# Patient Record
Sex: FEMALE | Race: WHITE | NOT HISPANIC OR LATINO | Employment: FULL TIME | URBAN - METROPOLITAN AREA
[De-identification: names, ages, dates, MRNs, and addresses within clinical notes are randomized per-mention and may not be internally consistent; named-entity substitution may affect disease eponyms.]

---

## 2023-06-23 ENCOUNTER — HOSPITAL ENCOUNTER (EMERGENCY)
Facility: HOSPITAL | Age: 38
Discharge: HOME | End: 2023-06-23
Attending: EMERGENCY MEDICINE
Payer: COMMERCIAL

## 2023-06-23 VITALS
SYSTOLIC BLOOD PRESSURE: 119 MMHG | TEMPERATURE: 97.7 F | DIASTOLIC BLOOD PRESSURE: 70 MMHG | HEART RATE: 80 BPM | OXYGEN SATURATION: 97 % | RESPIRATION RATE: 16 BRPM

## 2023-06-23 DIAGNOSIS — L03.213 PRESEPTAL CELLULITIS OF RIGHT EYE: Primary | ICD-10-CM

## 2023-06-23 PROCEDURE — 99283 EMERGENCY DEPT VISIT LOW MDM: CPT

## 2023-06-23 RX ORDER — CEPHALEXIN 500 MG/1
500 CAPSULE ORAL ONCE
Status: COMPLETED | OUTPATIENT
Start: 2023-06-23 | End: 2023-06-23

## 2023-06-23 RX ORDER — METHYLPREDNISOLONE 4 MG/1
TABLET ORAL
Qty: 21 TABLET | Refills: 0 | Status: SHIPPED | OUTPATIENT
Start: 2023-06-23

## 2023-06-23 RX ORDER — CEPHALEXIN 500 MG/1
500 CAPSULE ORAL EVERY 6 HOURS SCHEDULED
Qty: 40 CAPSULE | Refills: 0 | Status: SHIPPED | OUTPATIENT
Start: 2023-06-23 | End: 2023-07-03

## 2023-06-23 RX ORDER — PREDNISONE 20 MG/1
40 TABLET ORAL ONCE
Status: COMPLETED | OUTPATIENT
Start: 2023-06-23 | End: 2023-06-23

## 2023-06-23 RX ADMIN — CEPHALEXIN 500 MG: 500 CAPSULE ORAL at 06:47

## 2023-06-23 RX ADMIN — PREDNISONE 40 MG: 20 TABLET ORAL at 06:47

## 2023-06-23 NOTE — ED PROVIDER NOTES
History  Chief Complaint   Patient presents with   • Eye Swelling     Pt arrives with swelling of right eye starting 4 days ago  Reports felt a burning when gardening and progressively eye has become more swollen and itchy  Pt with poison ivy on arms and legs as well  Pt started on amoxicillin and steroids yesterday by walk-in clinic  Has taken one dose so far  79-year-old female presents to the emergency department for evaluation of swelling around the right eye  Patient states that symptoms started about 4 days ago after she was working in her garden and felt as though she was bitten in the face by some type of insect  She also reports that she rubbed her eyes and is currently also suffering from poison ivy on both bilateral forearms  Swelling worsened and so she was seen yesterday at a walk-in clinic and started on amoxicillin and given a shot of dexamethasone  Despite that and 6 hours later she is still having some worsening of her swelling  She denies any vision changes, fever, nausea vomiting or pain with movement of her eye  Patient does not wear contact lenses  History provided by:  Patient   used: No        Prior to Admission Medications   Prescriptions Last Dose Informant Patient Reported? Taking?   ascorbic acid (VITAMIN C) 500 MG tablet  Self No No   Sig: Take 1 tablet (500 mg total) by mouth 3 (three) times a week Monday, Wednesday, Friday   ferrous sulfate 324 (65 Fe) mg  Self No No   Sig: Take 1 tablet (324 mg total) by mouth 3 (three) times a week Monday, Wednesday, Friday   rizatriptan (MAXALT) 5 mg tablet   No No   Sig: TAKE 1 TABLET (5 MG TOTAL) BY MOUTH AS NEEDED FOR MIGRAINE TAKE AT THE ONSET OF MIGRAINE IF SYMPTOMS CONTINUE OR RETURN, MAY TAKE ANOTHER DOSE AT LEAST 2 HOURS AFTER FIRST DOSE  TAKE NO MORE THAN 2 DOSES IN A DAY  Facility-Administered Medications: None       History reviewed  No pertinent past medical history      Past Surgical History: Procedure Laterality Date   • TUBAL LIGATION Bilateral 2012       Family History   Problem Relation Age of Onset   • COPD Mother    • Endometriosis Mother    • Breast cancer Maternal Aunt 36     I have reviewed and agree with the history as documented  E-Cigarette/Vaping   • E-Cigarette Use Never User      E-Cigarette/Vaping Substances   • Nicotine No    • THC No    • CBD No    • Flavoring No    • Other No    • Unknown No      Social History     Tobacco Use   • Smoking status: Never   Vaping Use   • Vaping Use: Never used   Substance Use Topics   • Alcohol use: Not Currently     Comment: 1-2 drinks once a week   • Drug use: Never       Review of Systems   Constitutional: Negative for fever  HENT: Positive for facial swelling  Eyes: Positive for itching  Negative for photophobia, pain, discharge, redness and visual disturbance  Gastrointestinal: Negative for nausea and vomiting  Skin: Positive for rash  Neurological: Negative for numbness  All other systems reviewed and are negative  Physical Exam  Physical Exam  Vitals and nursing note reviewed  Constitutional:       Appearance: Normal appearance  She is not ill-appearing or toxic-appearing  HENT:      Head: Normocephalic  Nose: Nose normal       Mouth/Throat:      Mouth: Mucous membranes are moist    Eyes:      General: Vision grossly intact  No visual field deficit or scleral icterus  Right eye: No discharge  Left eye: No discharge  Extraocular Movements: Extraocular movements intact  Right eye: Normal extraocular motion  Conjunctiva/sclera: Conjunctivae normal       Right eye: Right conjunctiva is not injected  No chemosis, exudate or hemorrhage  Pupils: Pupils are equal, round, and reactive to light  Comments: Significant amount of swelling and redness around the right thigh  There is no pain with extraocular movements  Patient is visual acuity is 20/25 in each eye  Globes nice and soft  There is no drainage from the eye  Skin:     General: Skin is warm and dry  Findings: Rash present  Comments: Bilateral forearms with rash consistent with poison ivy   Neurological:      General: No focal deficit present  Mental Status: She is alert  Mental status is at baseline  Vital Signs  ED Triage Vitals [06/23/23 0622]   Temperature Pulse Respirations Blood Pressure SpO2   97 7 °F (36 5 °C) 80 16 119/70 97 %      Temp Source Heart Rate Source Patient Position - Orthostatic VS BP Location FiO2 (%)   Oral Monitor Sitting Right arm --      Pain Score       --           Vitals:    06/23/23 0622   BP: 119/70   Pulse: 80   Patient Position - Orthostatic VS: Sitting         Visual Acuity      ED Medications  Medications   cephalexin (KEFLEX) capsule 500 mg (500 mg Oral Given 6/23/23 0647)   predniSONE tablet 40 mg (40 mg Oral Given 6/23/23 2423)       Diagnostic Studies  Results Reviewed     None                 No orders to display              Procedures  Procedures         ED Course                                             Medical Decision Making  53-year-old healthy female presents to the emergency department with what appears to be preseptal cellulitis  She has no fever no visual signs of illness, no pain with extraocular movements and she has a normal eye exam   She has been on antibiotics for less than a full 24 hours  Vital signs reviewed  Plan is to discharge her on continued antibiotics and some additional steroids not only to help with her swelling but to help with the poison ivy she is suffering from more arms  She is also given some supportive care directions as well  I have encouraged her to follow-up with her primary care physician in the next 24 to 48 hours and have provided her with strict risk return precautions  Patient understands and agrees with plan as discussed and all questions answered prior to discharge      Risk  Prescription drug management  Disposition  Final diagnoses:   Preseptal cellulitis of right eye     Time reflects when diagnosis was documented in both MDM as applicable and the Disposition within this note     Time User Action Codes Description Comment    6/23/2023  6:33 AM Vi Beaulieu Add [X39 [id number]] Preseptal cellulitis of right eye       ED Disposition     None      Follow-up Information     Follow up With Specialties Details Why [address], DO Family Medicine Call in 1 day  [address]  [facility]  [address] [facility]  [phone]            Patient's Medications   Discharge Prescriptions    CEPHALEXIN (KEFLEX) 500 MG CAPSULE    Take 1 capsule (500 mg total) by mouth every 6 (six) hours for 10 days       Start Date: 6/23/2023 End Date: 7/3/2023       Order Dose: 500 mg       Quantity: 40 capsule    Refills: 0    METHYLPREDNISOLONE 4 MG TABLET THERAPY PACK    Use as directed on package       Start Date: 6/23/2023 End Date: --       Order Dose: --       Quantity: 21 tablet    Refills: 0       No discharge procedures on file      PDMP Review     None          ED Provider  Electronically Signed by           Kishore Braden MD  06/23/23 3133

## 2023-06-23 NOTE — DISCHARGE INSTRUCTIONS
Take all of the medications as directed  Apply warm compresses to the area throughout the day to help with inflammation and swelling  Follow-up with your primary doctor in the next 24 to 48 hours  Return to the emergency department immediately for any new or worsening symptoms

## 2023-06-27 ENCOUNTER — TELEPHONE (OUTPATIENT)
Dept: FAMILY MEDICINE CLINIC | Facility: CLINIC | Age: 38
End: 2023-06-27

## 2023-10-25 ENCOUNTER — TELEPHONE (OUTPATIENT)
Dept: HEMATOLOGY ONCOLOGY | Facility: CLINIC | Age: 38
End: 2023-10-25

## 2023-10-25 NOTE — TELEPHONE ENCOUNTER
Spoke with patient regarding labs needed to be drawn prior to appointment Monday, October 30th. Indicated the scripts are in the system, they are non-fasting and patient can go to any [facility] facility to have the labs drawn. Patient verbalized understanding.

## 2023-10-26 ENCOUNTER — APPOINTMENT (OUTPATIENT)
Dept: LAB | Facility: HOSPITAL | Age: 38
End: 2023-10-26
Payer: COMMERCIAL

## 2023-10-26 ENCOUNTER — TELEPHONE (OUTPATIENT)
Dept: HEMATOLOGY ONCOLOGY | Facility: CLINIC | Age: 38
End: 2023-10-26

## 2023-10-26 DIAGNOSIS — D56.3 ALPHA THALASSEMIA TRAIT: ICD-10-CM

## 2023-10-26 DIAGNOSIS — N92.0 MENORRHAGIA WITH REGULAR CYCLE: ICD-10-CM

## 2023-10-26 DIAGNOSIS — E61.1 IRON DEFICIENCY: ICD-10-CM

## 2023-10-26 LAB
BASOPHILS # BLD AUTO: 0.06 THOUSANDS/ÂΜL (ref 0–0.1)
BASOPHILS NFR BLD AUTO: 1 % (ref 0–1)
EOSINOPHIL # BLD AUTO: 0.12 THOUSAND/ÂΜL (ref 0–0.61)
EOSINOPHIL NFR BLD AUTO: 2 % (ref 0–6)
ERYTHROCYTE [DISTWIDTH] IN BLOOD BY AUTOMATED COUNT: 13.2 % (ref 11.6–15.1)
FERRITIN SERPL-MCNC: 29 NG/ML (ref 11–307)
HCT VFR BLD AUTO: 39.2 % (ref 34.8–46.1)
HGB BLD-MCNC: 12.8 G/DL (ref 11.5–15.4)
IMM GRANULOCYTES # BLD AUTO: 0.02 THOUSAND/UL (ref 0–0.2)
IMM GRANULOCYTES NFR BLD AUTO: 0 % (ref 0–2)
IRON SATN MFR SERPL: 11 % (ref 15–50)
IRON SERPL-MCNC: 33 UG/DL (ref 50–212)
LYMPHOCYTES # BLD AUTO: 1.57 THOUSANDS/ÂΜL (ref 0.6–4.47)
LYMPHOCYTES NFR BLD AUTO: 29 % (ref 14–44)
MCH RBC QN AUTO: 26.7 PG (ref 26.8–34.3)
MCHC RBC AUTO-ENTMCNC: 32.7 G/DL (ref 31.4–37.4)
MCV RBC AUTO: 82 FL (ref 82–98)
MONOCYTES # BLD AUTO: 0.44 THOUSAND/ÂΜL (ref 0.17–1.22)
MONOCYTES NFR BLD AUTO: 8 % (ref 4–12)
NEUTROPHILS # BLD AUTO: 3.2 THOUSANDS/ÂΜL (ref 1.85–7.62)
NEUTS SEG NFR BLD AUTO: 60 % (ref 43–75)
NRBC BLD AUTO-RTO: 0 /100 WBCS
PLATELET # BLD AUTO: 214 THOUSANDS/UL (ref 149–390)
PMV BLD AUTO: 11.8 FL (ref 8.9–12.7)
RBC # BLD AUTO: 4.79 MILLION/UL (ref 3.81–5.12)
TIBC SERPL-MCNC: 298 UG/DL (ref 250–450)
TSH SERPL DL<=0.05 MIU/L-ACNC: 2.34 UIU/ML (ref 0.45–4.5)
UIBC SERPL-MCNC: 265 UG/DL (ref 155–355)
WBC # BLD AUTO: 5.41 THOUSAND/UL (ref 4.31–10.16)

## 2023-10-26 PROCEDURE — 84443 ASSAY THYROID STIM HORMONE: CPT

## 2023-10-26 PROCEDURE — 82728 ASSAY OF FERRITIN: CPT

## 2023-10-26 PROCEDURE — 85025 COMPLETE CBC W/AUTO DIFF WBC: CPT

## 2023-10-26 PROCEDURE — 83540 ASSAY OF IRON: CPT

## 2023-10-26 PROCEDURE — 83550 IRON BINDING TEST: CPT

## 2023-10-26 PROCEDURE — 36415 COLL VENOUS BLD VENIPUNCTURE: CPT

## 2023-10-26 NOTE — TELEPHONE ENCOUNTER
Spoke with patient regarding two appointment times on Monday.   Confirmed she would prefer the 9:00am to the 10:40am, so cancelled the 10:40am.

## 2023-10-30 ENCOUNTER — OFFICE VISIT (OUTPATIENT)
Dept: HEMATOLOGY ONCOLOGY | Facility: MEDICAL CENTER | Age: 38
End: 2023-10-30
Payer: COMMERCIAL

## 2023-10-30 ENCOUNTER — TELEPHONE (OUTPATIENT)
Dept: HEMATOLOGY ONCOLOGY | Facility: MEDICAL CENTER | Age: 38
End: 2023-10-30

## 2023-10-30 VITALS
HEIGHT: 62 IN | OXYGEN SATURATION: 100 % | SYSTOLIC BLOOD PRESSURE: 110 MMHG | RESPIRATION RATE: 17 BRPM | TEMPERATURE: 98 F | WEIGHT: 139 LBS | DIASTOLIC BLOOD PRESSURE: 64 MMHG | HEART RATE: 84 BPM | BODY MASS INDEX: 25.58 KG/M2

## 2023-10-30 DIAGNOSIS — E61.1 IRON DEFICIENCY: Primary | ICD-10-CM

## 2023-10-30 DIAGNOSIS — D56.3 ALPHA THALASSEMIA TRAIT: ICD-10-CM

## 2023-10-30 PROCEDURE — 99215 OFFICE O/P EST HI 40 MIN: CPT | Performed by: INTERNAL MEDICINE

## 2023-10-30 RX ORDER — SODIUM CHLORIDE 9 MG/ML
20 INJECTION, SOLUTION INTRAVENOUS ONCE
Status: CANCELLED | OUTPATIENT
Start: 2023-10-31

## 2023-10-30 RX ORDER — SODIUM CHLORIDE 9 MG/ML
20 INJECTION, SOLUTION INTRAVENOUS ONCE
Status: CANCELLED | OUTPATIENT
Start: 2023-10-30

## 2023-10-30 NOTE — TELEPHONE ENCOUNTER
Lvm on answering machine with time and date of infusions, patient aware schedule is updated on [employer]. Patient has my teams number to return my call and confirm appts.

## 2023-10-30 NOTE — PROGRESS NOTES
Wilian Alexandre  1985  [facility] [place] [address] [address] [facility] [id number]    DISCUSSION/SUMMARY:    39-year-old female with history of iron deficiency anemia, alpha thalassemia trait. Issues:    History of iron deficiency anemia. Patient's hemoglobin level is okay but patient has a decreased ferritin level, significant fatigue, is symptomatic. Routinely patient has required IV iron approximately 1-2 times each year. Last IV iron was approximately 11 months ago. We discussed options. Patient is to be treated with Feraheme IV, days 1 and 8. Alpha thalassemia trait. This obviously adds to the anemia. Patient should take a multivitamin with folate. Bicornate uterus, heavy menstrual periods. Patient was seen by gynecology in [facility], referred to a specialist.  Mrs. Kirsten Aiken is to follow-up with the specialist.  Other manipulations need to be discussed as the iron deficiency anemia will continue if patient has persistently heavy menstrual periods. History of headaches, blood pressure problems (NOS). The headaches may be due to the anemia at least partially. Patient understands that she needs to find a PCP here in [facility] catch up on routine health maintenance and medical care as well as to address any blood pressure problems. Patient is to return to this office in 6 months with repeat blood work before. Mrs. Kirsten Aiken knows to call the hematology/oncology office if there are any other questions or concerns. Carefully review your medication list and verify that the list is accurate and up-to-date. Please call the hematology/oncology office if there are medications missing from the list, medications on the list that you are not currently taking or if there is a dosage or instruction that is different from how you're taking that medication.     Patient goals and areas of care: Monitor CBC and iron studies  Barriers to care:  Patient is able to self-care  _____________________________________________________________________________________    Chief Complaint   Patient presents with    Follow-up    Iron deficiency anemia     History of Present Illness: 26-year-old female with a history of iron deficiency anemia, history of heavy menstrual periods previously moving from [place] (where she had received routine IV iron). Patient also with an alpha thalassemia trait. Patient was previously seen by TREASURE Velez.    Mrs. Lao Postal had been getting iron infusions approximately every 6 months in [place]. Patient states feeling +/-. Still with heavy menstrual periods. Still with fatigue, no chewing ice but ++ restless legs. No shortness of breath or dyspnea on exertion, no chest pain or pressure. No GI or  problems. Appetite is good, weight is stable. Patient has had blood pressure issues in the past; has not seen a PCP while in [facility]. Review of Systems   Constitutional:  Positive for fatigue. HENT: Negative. Eyes: Negative. Respiratory: Negative. Cardiovascular: Negative. Gastrointestinal: Negative. Endocrine: Negative. Genitourinary:  Positive for menstrual problem. Musculoskeletal: Negative. Skin: Negative. Allergic/Immunologic: Negative. Neurological: Negative. Hematological: Negative. Psychiatric/Behavioral: Negative. All other systems reviewed and are negative. Patient Active Problem List   Diagnosis    Alpha thalassemia trait    Bicornuate uterus    Intractable migraine without aura and without status migrainosus    Iron deficiency     No past medical history on file.   Past Surgical History:   Procedure Laterality Date    TUBAL LIGATION Bilateral 2012     Family History   Problem Relation Age of Onset    COPD Mother     Endometriosis Mother     Breast cancer Maternal Aunt 36     Social History     Socioeconomic History    Marital status: /Civil Union     Spouse name: Not on file    Number of children: Not on file    Years of education: Not on file    Highest education level: Not on file   Occupational History    Not on file   Tobacco Use    Smoking status: Never    Smokeless tobacco: Not on file   Vaping Use    Vaping Use: Never used   Substance and Sexual Activity    Alcohol use: Not Currently     Comment: 1-2 drinks once a week    Drug use: Never    Sexual activity: Yes     Partners: Male     Birth control/protection: Female Sterilization     Comment: 15 years    Other Topics Concern    Not on file   Social History Narrative    Not on file     Social Determinants of Health     Financial Resource Strain: Not on file   Food Insecurity: Not on file   Transportation Needs: Not on file   Physical Activity: Not on file   Stress: Not on file   Social Connections: Not on file   Intimate Partner Violence: Not on file   Housing Stability: Not on file       Current Outpatient Medications:     ascorbic acid (VITAMIN C) 500 MG tablet, Take 1 tablet (500 mg total) by mouth 3 (three) times a week Monday, Wednesday, Friday (Patient taking differently: Take 500 mg by mouth 3 (three) times a week PRN), Disp: 30 tablet, Rfl: 3    ferrous sulfate 324 (65 Fe) mg, Take 1 tablet (324 mg total) by mouth 3 (three) times a week Monday, Wednesday, Friday, Disp: 30 tablet, Rfl: 3    methylPREDNISolone 4 MG tablet therapy pack, Use as directed on package (Patient not taking: Reported on 10/30/2023), Disp: 21 tablet, Rfl: 0    rizatriptan (MAXALT) 5 mg tablet, TAKE 1 TABLET (5 MG TOTAL) BY MOUTH AS NEEDED FOR MIGRAINE TAKE AT THE ONSET OF MIGRAINE IF SYMPTOMS CONTINUE OR RETURN, MAY TAKE ANOTHER DOSE AT LEAST 2 HOURS AFTER FIRST DOSE. TAKE NO MORE THAN 2 DOSES IN A DAY.  (Patient not taking: Reported on 10/30/2023), Disp: 4 tablet, Rfl: 2    Allergies   Allergen Reactions    Tetanus-Diphtheria Toxoids Td Swelling       Vitals:    10/30/23 0853   BP: 110/64   Pulse: 84   Resp: 17   Temp: 98 °F (36.7 °C)   SpO2: 100%     Physical Exam  Constitutional:       Appearance: She is well-developed. HENT:      Head: Normocephalic and atraumatic. Right Ear: External ear normal.      Left Ear: External ear normal.   Eyes:      Conjunctiva/sclera: Conjunctivae normal.      Pupils: Pupils are equal, round, and reactive to light. Cardiovascular:      Rate and Rhythm: Normal rate and regular rhythm. Heart sounds: Normal heart sounds. Pulmonary:      Effort: Pulmonary effort is normal.      Breath sounds: Normal breath sounds. Abdominal:      General: Bowel sounds are normal.      Palpations: Abdomen is soft. Musculoskeletal:         General: Normal range of motion. Cervical back: Normal range of motion and neck supple. Skin:     General: Skin is warm. Comments: Relatively good color, warm, moist, no petechiae or ecchymoses   Neurological:      Mental Status: She is alert and oriented to person, place, and time. Deep Tendon Reflexes: Reflexes are normal and symmetric. Psychiatric:         Behavior: Behavior normal.         Thought Content:  Thought content normal.         Judgment: Judgment normal.     Extremities: No lower extremity edema bilaterally    Labs    10/26/2023 TSH = 2.343

## 2023-10-30 NOTE — TELEPHONE ENCOUNTER
Discussed with Dr Ginny Ortega or venofer are the iron formulations we offer  Patient agrees to venofer weekly times 5  Patient requests to begin ASAP and earliest appt in AM  Will send to schedulers to arrange

## 2023-10-31 ENCOUNTER — OFFICE VISIT (OUTPATIENT)
Dept: FAMILY MEDICINE CLINIC | Facility: CLINIC | Age: 38
End: 2023-10-31
Payer: COMMERCIAL

## 2023-10-31 VITALS
HEART RATE: 75 BPM | DIASTOLIC BLOOD PRESSURE: 68 MMHG | BODY MASS INDEX: 25.47 KG/M2 | WEIGHT: 138.4 LBS | TEMPERATURE: 97.9 F | HEIGHT: 62 IN | RESPIRATION RATE: 17 BRPM | SYSTOLIC BLOOD PRESSURE: 112 MMHG

## 2023-10-31 DIAGNOSIS — N92.0 MENORRHAGIA WITH REGULAR CYCLE: ICD-10-CM

## 2023-10-31 DIAGNOSIS — R68.89 FORGETFULNESS: ICD-10-CM

## 2023-10-31 DIAGNOSIS — E55.9 VITAMIN D DEFICIENCY: ICD-10-CM

## 2023-10-31 DIAGNOSIS — E61.1 IRON DEFICIENCY: ICD-10-CM

## 2023-10-31 DIAGNOSIS — Z13.1 SCREENING FOR DIABETES MELLITUS: ICD-10-CM

## 2023-10-31 DIAGNOSIS — F41.1 GAD (GENERALIZED ANXIETY DISORDER): ICD-10-CM

## 2023-10-31 DIAGNOSIS — E61.1 IRON DEFICIENCY: Primary | ICD-10-CM

## 2023-10-31 DIAGNOSIS — G47.00 INSOMNIA, UNSPECIFIED TYPE: ICD-10-CM

## 2023-10-31 DIAGNOSIS — I95.9 HYPOTENSION, UNSPECIFIED HYPOTENSION TYPE: Primary | ICD-10-CM

## 2023-10-31 DIAGNOSIS — Z13.6 SCREENING FOR CARDIOVASCULAR CONDITION: ICD-10-CM

## 2023-10-31 DIAGNOSIS — Z11.59 NEED FOR HEPATITIS C SCREENING TEST: ICD-10-CM

## 2023-10-31 PROCEDURE — 99214 OFFICE O/P EST MOD 30 MIN: CPT | Performed by: NURSE PRACTITIONER

## 2023-10-31 PROCEDURE — 3725F SCREEN DEPRESSION PERFORMED: CPT | Performed by: NURSE PRACTITIONER

## 2023-10-31 RX ORDER — HYDROXYZINE PAMOATE 25 MG/1
25 CAPSULE ORAL
Qty: 30 CAPSULE | Refills: 1 | Status: SHIPPED | OUTPATIENT
Start: 2023-10-31 | End: 2023-12-30

## 2023-10-31 RX ORDER — SODIUM CHLORIDE 9 MG/ML
20 INJECTION, SOLUTION INTRAVENOUS ONCE
OUTPATIENT
Start: 2023-11-07

## 2023-10-31 NOTE — PROGRESS NOTES
Assessment/Plan:    1. Hypotension, unspecified hypotension type  -     Ambulatory Referral to Cardiology; Future    2. Vitamin D deficiency  -     Vitamin D 25 hydroxy; Future    3. Forgetfulness  -     Ambulatory Referral to Neurology; Future    4. Screening for diabetes mellitus  -     Comprehensive metabolic panel; Future    5. Screening for cardiovascular condition  -     Lipid Panel with Direct LDL reflex; Future    6. Need for hepatitis C screening test  -     Hepatitis C antibody; Future    7. KVNG (generalized anxiety disorder)  -     hydrOXYzine pamoate (VISTARIL) 25 mg capsule; Take 1 capsule (25 mg total) by mouth daily at bedtime as needed for anxiety    8. Insomnia, unspecified type  -     hydrOXYzine pamoate (VISTARIL) 25 mg capsule; Take 1 capsule (25 mg total) by mouth daily at bedtime as needed for anxiety    9. Iron deficiency  Assessment & Plan:  Managed by hematologist      10. Menorrhagia with regular cycle          BMI Counseling: Body mass index is 25.31 kg/m². Discussed the patient's BMI with her. The BMI is above normal. Nutrition recommendations include reducing portion sizes, decreasing overall calorie intake, and 3-5 servings of fruits/vegetables daily. Patient Instructions:  Supportive care discussed and advised. Advised to RTO for any worsening and no improvement. Follow up for no improvement and worsening of conditions. Patient advised and educated when to see immediate medical care. Return if symptoms worsen or fail to improve.       Future Appointments   Date Time Provider [address]   11/7/2023  8:00 AM [place] [facility] CHAIR [address]   11/14/2023  8:00 AM [place] CHAIR [address]   11/21/2023  7:30 AM [place] CHAIR [address]   11/28/2023  8:00 AM [place] CHAIR [address]   12/5/2023  7:30 AM [place] CHAIR [address]   5/3/2024  8:20 AM Rae Nash MD [facility]           Subjective: Patient ID: Gisell Sánchez is a 43 y.o. female. Chief Complaint   Patient presents with   • Marbin Cohnberg, LPN           Vitals:  /68   Pulse 75   Temp 97.9 °F (36.6 °C)   Resp 17   Ht 5' 2" (1.575 m)   Wt 62.8 kg (138 lb 6.4 oz)   LMP 09/13/2023 (Exact Date)   BMI 25.31 kg/m²     HPI  New to practice. Personal and family medical history reviewed. Patient stated that has h/o iron deficiency and following with hematologist and will be starting iron infusions. Stated that her menstrual cycles are very heavy and have seen gynecologist in feb 2023 and discussed possible hysterectomy but has not followed up and advised to follow with gynecologist and discuss her options. Stated that has h/o of dropping her BP from many years and noticed that does happens when eat steak and sometimes one drinks can do it and sometimes can drink lot of if and no issues. Had cardiac work up years ago and no issues found and will consult cardiologist again. Stated that noticed more frequent forgetfulness but denies any family h/o dementia and will follow with neurologist and could be possibly related to stress and low iron. Stated that does have anxiety issues and not able to sleep well at night time but able to manage anxiety and does not want to take daily medication and will try hydroxyzine. Denies chest pain and sob. Has h/o migraines too  Declined flu vaccine            PHQ-2/9 Depression Screening    Little interest or pleasure in doing things: 1 - several days  Feeling down, depressed, or hopeless: 1 - several days  PHQ-2 Score: 2  PHQ-2 Interpretation: Negative depression screen             The following portions of the patient's history were reviewed and updated as appropriate: allergies, current medications, past family history, past medical history, past social history, past surgical history and problem list.      Review of Systems   HENT: Negative. Respiratory: Negative. Cardiovascular:  Negative for chest pain, palpitations and leg swelling. As noted in HPI       Genitourinary:  Positive for menstrual problem. As noted in HPI       Neurological: Negative. As noted in HPI       Psychiatric/Behavioral:  Positive for sleep disturbance. The patient is nervous/anxious. Objective:    Social History     Tobacco Use   Smoking Status Never   Smokeless Tobacco Not on file       Allergies: Allergies   Allergen Reactions   • Tetanus-Diphtheria Toxoids Td Swelling         Current Outpatient Medications   Medication Sig Dispense Refill   • ascorbic acid (VITAMIN C) 500 MG tablet Take 1 tablet (500 mg total) by mouth 3 (three) times a week Monday, Wednesday, Friday (Patient taking differently: Take 500 mg by mouth 3 (three) times a week PRN) 30 tablet 3   • ferrous sulfate 324 (65 Fe) mg Take 1 tablet (324 mg total) by mouth 3 (three) times a week Monday, Wednesday, Friday 30 tablet 3   • hydrOXYzine pamoate (VISTARIL) 25 mg capsule Take 1 capsule (25 mg total) by mouth daily at bedtime as needed for anxiety 30 capsule 1   • rizatriptan (MAXALT) 5 mg tablet TAKE 1 TABLET (5 MG TOTAL) BY MOUTH AS NEEDED FOR MIGRAINE TAKE AT THE ONSET OF MIGRAINE IF SYMPTOMS CONTINUE OR RETURN, MAY TAKE ANOTHER DOSE AT LEAST 2 HOURS AFTER FIRST DOSE. TAKE NO MORE THAN 2 DOSES IN A DAY. (Patient not taking: Reported on 10/31/2023) 4 tablet 2     No current facility-administered medications for this visit. Physical Exam  Constitutional:       Appearance: Normal appearance. HENT:      Head: Normocephalic and atraumatic. Nose: Nose normal.   Eyes:      Conjunctiva/sclera: Conjunctivae normal.   Cardiovascular:      Rate and Rhythm: Normal rate and regular rhythm. Pulses: Normal pulses. Heart sounds: Normal heart sounds. Pulmonary:      Effort: Pulmonary effort is normal.      Breath sounds: Normal breath sounds. Skin:     General: Skin is warm and dry. Findings: No rash. Neurological:      Mental Status: She is alert and oriented to person, place, and time. GCS: GCS eye subscore is 4. GCS verbal subscore is 5. GCS motor subscore is 6. Cranial Nerves: No facial asymmetry. Sensory: No sensory deficit. Motor: No weakness. Coordination: Coordination normal.      Gait: Gait normal.   Psychiatric:         Mood and Affect: Mood normal.         Behavior: Behavior normal.         Thought Content:  Thought content normal.         Judgment: Judgment normal.                     LIBRA Roy

## 2023-10-31 NOTE — PATIENT INSTRUCTIONS
Hydroxyzine (By mouth)   Hydroxyzine ([id number])  Treats anxiety, nausea, vomiting, allergies, skin rash, hives, and itching. May also be used with anesthesia for medical procedures. Brand Name(s): Vistaril   There may be other brand names for this medicine. When This Medicine Should Not Be Used: This medicine is not right for everyone. Do not use it if you had an allergic reaction to hydroxyzine, cetirizine, or levocetirizine. Do not use it during the early part of pregnancy or if you have heart rhythm problems (including prolonged QT interval). How to Use This Medicine:   Capsule, Liquid, Tablet  Your doctor will tell you how much medicine to use. Do not use more than directed. Oral liquid: Measure the oral liquid medicine with a marked measuring spoon, oral syringe, or medicine cup. Shake well just before use. Missed dose: Take a dose as soon as you remember. If it is almost time for your next dose, wait until then and take a regular dose. Do not take extra medicine to make up for a missed dose. Store the medicine in a closed container at room temperature, away from heat, moisture, and direct light. Drugs and Foods to Avoid:   Ask your doctor or pharmacist before using any other medicine, including over-the-counter medicines, vitamins, and herbal products. Some medicines can affect how hydroxyzine works. Tell your doctor if you are using any of the following:  Droperidol, methadone, ondansetron, pentamidine  Antibiotic (including azithromycin, clarithromycin, erythromycin, gatifloxacin, moxifloxacin)  Medicine to treat depression (including citalopram, fluoxetine)  Medicine to treat heart rhythm problems (including amiodarone, procainamide, quinidine, sotalol)  Medicine to treat mental health problems (including chlorpromazine, clozapine, iloperidone, quetiapine, ziprasidone)  Keep all medicine out of the reach of children. Never share your medicine with anyone.   Warnings While Using This Medicine:   Tell your doctor if you are pregnant or breastfeeding, or if you have heart disease, heart failure, a slow heartbeat, skin problems, or had a recent heart attack. This medicine may cause the following problems:  Heart rhythm problems, including QT prolongation  Serious skin reaction, including acute generalized exanthematous pustulosis (AGEP)  This medicine may make you drowsy. Do not drive or do anything that could be dangerous until you know how this medicine affects you. Call your doctor if your symptoms do not improve or if they get worse. Keep all medicine out of the reach of children. Never share your medicine with anyone. Possible Side Effects While Using This Medicine:   Call your doctor right away if you notice any of these side effects: Allergic reaction: Itching or hives, swelling in your face or hands, swelling or tingling in your mouth or throat, chest tightness, trouble breathing  Fainting, dizziness, lightheadedness  Fast, pounding, or uneven heartbeat  Fever, skin rash  Severe tiredness  If you notice these less serious side effects, talk with your doctor:   Drowsiness, sleepiness  Dry mouth  If you notice other side effects that you think are caused by this medicine, tell your doctor. Call your doctor for medical advice about side effects. You may report side effects to FDA at 7-484-FDA-6169  © Copyright Elisha Goltz 2023 Information is for End User's use only and may not be sold, redistributed or otherwise used for commercial purposes. The above information is an  only. It is not intended as medical advice for individual conditions or treatments. Talk to your doctor, nurse or pharmacist before following any medical regimen to see if it is safe and effective for you.

## 2023-11-06 ENCOUNTER — APPOINTMENT (OUTPATIENT)
Dept: LAB | Facility: HOSPITAL | Age: 38
End: 2023-11-06
Payer: COMMERCIAL

## 2023-11-06 DIAGNOSIS — Z13.1 SCREENING FOR DIABETES MELLITUS: ICD-10-CM

## 2023-11-06 DIAGNOSIS — Z11.59 NEED FOR HEPATITIS C SCREENING TEST: ICD-10-CM

## 2023-11-06 DIAGNOSIS — Z13.6 SCREENING FOR CARDIOVASCULAR CONDITION: ICD-10-CM

## 2023-11-06 DIAGNOSIS — E55.9 VITAMIN D DEFICIENCY: ICD-10-CM

## 2023-11-06 LAB
25(OH)D3 SERPL-MCNC: 25.3 NG/ML (ref 30–100)
ALBUMIN SERPL BCP-MCNC: 4.3 G/DL (ref 3.5–5)
ALP SERPL-CCNC: 48 U/L (ref 34–104)
ALT SERPL W P-5'-P-CCNC: 9 U/L (ref 7–52)
ANION GAP SERPL CALCULATED.3IONS-SCNC: 3 MMOL/L
AST SERPL W P-5'-P-CCNC: 14 U/L (ref 13–39)
BILIRUB SERPL-MCNC: 0.35 MG/DL (ref 0.2–1)
BUN SERPL-MCNC: 13 MG/DL (ref 5–25)
CALCIUM SERPL-MCNC: 8.6 MG/DL (ref 8.4–10.2)
CHLORIDE SERPL-SCNC: 107 MMOL/L (ref 96–108)
CHOLEST SERPL-MCNC: 177 MG/DL
CO2 SERPL-SCNC: 29 MMOL/L (ref 21–32)
CREAT SERPL-MCNC: 0.62 MG/DL (ref 0.6–1.3)
GFR SERPL CREATININE-BSD FRML MDRD: 113 ML/MIN/1.73SQ M
GLUCOSE P FAST SERPL-MCNC: 83 MG/DL (ref 65–99)
HCV AB SER QL: NORMAL
HDLC SERPL-MCNC: 49 MG/DL
LDLC SERPL CALC-MCNC: 114 MG/DL (ref 0–100)
POTASSIUM SERPL-SCNC: 3.8 MMOL/L (ref 3.5–5.3)
PROT SERPL-MCNC: 6.6 G/DL (ref 6.4–8.4)
SODIUM SERPL-SCNC: 139 MMOL/L (ref 135–147)
TRIGL SERPL-MCNC: 69 MG/DL

## 2023-11-06 PROCEDURE — 86803 HEPATITIS C AB TEST: CPT

## 2023-11-06 PROCEDURE — 80061 LIPID PANEL: CPT

## 2023-11-06 PROCEDURE — 82306 VITAMIN D 25 HYDROXY: CPT

## 2023-11-06 PROCEDURE — 36415 COLL VENOUS BLD VENIPUNCTURE: CPT

## 2023-11-06 PROCEDURE — 80053 COMPREHEN METABOLIC PANEL: CPT

## 2023-11-07 ENCOUNTER — HOSPITAL ENCOUNTER (OUTPATIENT)
Dept: INFUSION CENTER | Facility: HOSPITAL | Age: 38
Discharge: HOME | End: 2023-11-07
Attending: INTERNAL MEDICINE
Payer: COMMERCIAL

## 2023-11-07 VITALS
HEART RATE: 86 BPM | DIASTOLIC BLOOD PRESSURE: 63 MMHG | TEMPERATURE: 97.5 F | OXYGEN SATURATION: 98 % | SYSTOLIC BLOOD PRESSURE: 104 MMHG | RESPIRATION RATE: 18 BRPM

## 2023-11-07 DIAGNOSIS — E55.9 VITAMIN D DEFICIENCY: Primary | ICD-10-CM

## 2023-11-07 DIAGNOSIS — E61.1 IRON DEFICIENCY: Primary | ICD-10-CM

## 2023-11-07 PROCEDURE — 96365 THER/PROPH/DIAG IV INF INIT: CPT

## 2023-11-07 RX ORDER — SODIUM CHLORIDE 9 MG/ML
20 INJECTION, SOLUTION INTRAVENOUS ONCE
Status: CANCELLED | OUTPATIENT
Start: 2023-11-14

## 2023-11-07 RX ORDER — SODIUM CHLORIDE 9 MG/ML
20 INJECTION, SOLUTION INTRAVENOUS ONCE
Status: COMPLETED | OUTPATIENT
Start: 2023-11-07 | End: 2023-11-07

## 2023-11-07 RX ORDER — ERGOCALCIFEROL 1.25 MG/1
50000 CAPSULE ORAL WEEKLY
Qty: 4 CAPSULE | Refills: 1 | Status: SHIPPED | OUTPATIENT
Start: 2023-11-07 | End: 2023-12-27

## 2023-11-07 RX ADMIN — IRON SUCROSE 200 MG: 20 INJECTION, SOLUTION INTRAVENOUS at 08:18

## 2023-11-07 RX ADMIN — SODIUM CHLORIDE 20 ML/HR: 9 INJECTION, SOLUTION INTRAVENOUS at 08:18

## 2023-11-14 ENCOUNTER — HOSPITAL ENCOUNTER (OUTPATIENT)
Dept: INFUSION CENTER | Facility: HOSPITAL | Age: 38
Discharge: HOME | End: 2023-11-14
Attending: INTERNAL MEDICINE
Payer: COMMERCIAL

## 2023-11-14 VITALS
SYSTOLIC BLOOD PRESSURE: 108 MMHG | DIASTOLIC BLOOD PRESSURE: 60 MMHG | HEART RATE: 73 BPM | TEMPERATURE: 98.2 F | RESPIRATION RATE: 18 BRPM | OXYGEN SATURATION: 98 %

## 2023-11-14 DIAGNOSIS — E61.1 IRON DEFICIENCY: Primary | ICD-10-CM

## 2023-11-14 PROCEDURE — 96365 THER/PROPH/DIAG IV INF INIT: CPT

## 2023-11-14 RX ORDER — SODIUM CHLORIDE 9 MG/ML
20 INJECTION, SOLUTION INTRAVENOUS ONCE
Status: COMPLETED | OUTPATIENT
Start: 2023-11-14 | End: 2023-11-14

## 2023-11-14 RX ORDER — SODIUM CHLORIDE 9 MG/ML
20 INJECTION, SOLUTION INTRAVENOUS ONCE
Status: CANCELLED | OUTPATIENT
Start: 2023-11-21

## 2023-11-14 RX ADMIN — SODIUM CHLORIDE 20 ML/HR: 0.9 INJECTION, SOLUTION INTRAVENOUS at 08:14

## 2023-11-14 RX ADMIN — IRON SUCROSE 200 MG: 20 INJECTION, SOLUTION INTRAVENOUS at 08:15

## 2023-11-21 ENCOUNTER — HOSPITAL ENCOUNTER (OUTPATIENT)
Dept: INFUSION CENTER | Facility: HOSPITAL | Age: 38
Discharge: HOME | End: 2023-11-21
Attending: INTERNAL MEDICINE
Payer: COMMERCIAL

## 2023-11-21 VITALS
OXYGEN SATURATION: 98 % | HEART RATE: 86 BPM | DIASTOLIC BLOOD PRESSURE: 67 MMHG | SYSTOLIC BLOOD PRESSURE: 122 MMHG | RESPIRATION RATE: 18 BRPM | TEMPERATURE: 97.9 F

## 2023-11-21 DIAGNOSIS — E61.1 IRON DEFICIENCY: Primary | ICD-10-CM

## 2023-11-21 PROCEDURE — 96365 THER/PROPH/DIAG IV INF INIT: CPT

## 2023-11-21 RX ORDER — SODIUM CHLORIDE 9 MG/ML
20 INJECTION, SOLUTION INTRAVENOUS ONCE
Status: CANCELLED | OUTPATIENT
Start: 2023-11-28

## 2023-11-21 RX ORDER — SODIUM CHLORIDE 9 MG/ML
20 INJECTION, SOLUTION INTRAVENOUS ONCE
Status: COMPLETED | OUTPATIENT
Start: 2023-11-21 | End: 2023-11-21

## 2023-11-21 RX ADMIN — IRON SUCROSE 200 MG: 20 INJECTION, SOLUTION INTRAVENOUS at 07:52

## 2023-11-21 RX ADMIN — SODIUM CHLORIDE 20 ML/HR: 9 INJECTION, SOLUTION INTRAVENOUS at 07:52

## 2023-11-26 DIAGNOSIS — F41.1 GAD (GENERALIZED ANXIETY DISORDER): ICD-10-CM

## 2023-11-26 DIAGNOSIS — G47.00 INSOMNIA, UNSPECIFIED TYPE: ICD-10-CM

## 2023-11-27 RX ORDER — HYDROXYZINE PAMOATE 25 MG/1
25 CAPSULE ORAL
Qty: 90 CAPSULE | Refills: 1 | Status: SHIPPED | OUTPATIENT
Start: 2023-11-27 | End: 2024-05-25

## 2023-11-28 ENCOUNTER — HOSPITAL ENCOUNTER (OUTPATIENT)
Dept: INFUSION CENTER | Facility: HOSPITAL | Age: 38
Discharge: HOME | End: 2023-11-28
Attending: INTERNAL MEDICINE
Payer: COMMERCIAL

## 2023-11-28 VITALS
RESPIRATION RATE: 18 BRPM | OXYGEN SATURATION: 98 % | TEMPERATURE: 97.2 F | DIASTOLIC BLOOD PRESSURE: 56 MMHG | HEART RATE: 84 BPM | SYSTOLIC BLOOD PRESSURE: 103 MMHG

## 2023-11-28 DIAGNOSIS — E61.1 IRON DEFICIENCY: Primary | ICD-10-CM

## 2023-11-28 PROCEDURE — 96365 THER/PROPH/DIAG IV INF INIT: CPT

## 2023-11-28 RX ORDER — SODIUM CHLORIDE 9 MG/ML
20 INJECTION, SOLUTION INTRAVENOUS ONCE
Status: COMPLETED | OUTPATIENT
Start: 2023-11-28 | End: 2023-11-28

## 2023-11-28 RX ORDER — SODIUM CHLORIDE 9 MG/ML
20 INJECTION, SOLUTION INTRAVENOUS ONCE
Status: CANCELLED | OUTPATIENT
Start: 2023-12-05

## 2023-11-28 RX ADMIN — SODIUM CHLORIDE 20 ML/HR: 0.9 INJECTION, SOLUTION INTRAVENOUS at 08:01

## 2023-11-28 RX ADMIN — IRON SUCROSE 200 MG: 20 INJECTION, SOLUTION INTRAVENOUS at 08:01

## 2023-11-28 NOTE — PLAN OF CARE
Problem: Knowledge Deficit  Goal: Patient/family/caregiver demonstrates understanding of disease process, treatment plan, medications, and discharge instructions  Description: Complete learning assessment and assess knowledge base.   Interventions:  - Provide teaching at level of understanding  - Provide teaching via preferred learning methods  Outcome: Progressing     Problem: HEMATOLOGIC - ADULT  Goal: Maintains hematologic stability  Description: INTERVENTIONS  - Monitor labs  - Administer Venofer as ordered  Outcome: Progressing Add 52 Modifier (Optional): no Post-Care Instructions: I reviewed with the patient in detail post-care instructions. Patient is to wear sunprotection, and avoid picking at any of the treated lesions. Pt may apply Vaseline to crusted or scabbing areas. Detail Level: Detailed Medical Necessity Information: It is in your best interest to select a reason for this procedure from the list below. All of these items fulfill various CMS LCD requirements except the new and changing color options. Medical Necessity Clause: This procedure was medically necessary because the lesions that were treated were: Treatment Number (Will Not Render If 0): 0 Anesthesia Volume In Cc: 0.5

## 2023-12-05 ENCOUNTER — HOSPITAL ENCOUNTER (OUTPATIENT)
Dept: INFUSION CENTER | Facility: HOSPITAL | Age: 38
Discharge: HOME | End: 2023-12-05
Attending: INTERNAL MEDICINE
Payer: COMMERCIAL

## 2023-12-05 VITALS
RESPIRATION RATE: 18 BRPM | DIASTOLIC BLOOD PRESSURE: 57 MMHG | OXYGEN SATURATION: 98 % | TEMPERATURE: 97.3 F | SYSTOLIC BLOOD PRESSURE: 108 MMHG | HEART RATE: 87 BPM

## 2023-12-05 DIAGNOSIS — E61.1 IRON DEFICIENCY: Primary | ICD-10-CM

## 2023-12-05 PROCEDURE — 96365 THER/PROPH/DIAG IV INF INIT: CPT

## 2023-12-05 RX ORDER — SODIUM CHLORIDE 9 MG/ML
20 INJECTION, SOLUTION INTRAVENOUS ONCE
Status: CANCELLED | OUTPATIENT
Start: 2023-12-13

## 2023-12-05 RX ORDER — SODIUM CHLORIDE 9 MG/ML
20 INJECTION, SOLUTION INTRAVENOUS ONCE
Status: COMPLETED | OUTPATIENT
Start: 2023-12-05 | End: 2023-12-05

## 2023-12-05 RX ADMIN — SODIUM CHLORIDE 20 ML/HR: 0.9 INJECTION, SOLUTION INTRAVENOUS at 07:53

## 2023-12-05 RX ADMIN — IRON SUCROSE 200 MG: 20 INJECTION, SOLUTION INTRAVENOUS at 07:53

## 2023-12-08 DIAGNOSIS — E55.9 VITAMIN D DEFICIENCY: ICD-10-CM

## 2023-12-08 RX ORDER — ERGOCALCIFEROL 1.25 MG/1
CAPSULE ORAL
Qty: 12 CAPSULE | Refills: 1 | Status: SHIPPED | OUTPATIENT
Start: 2023-12-08

## 2024-01-03 NOTE — PROGRESS NOTES
Consultation - Cardiology Office  [facility].    Angela Martin 38 y.o. female MRN: [id number]  : 1985  Unit/Bed#:  Encounter: [id number]      ASSESSMENT:  Chronic hypotension  BP today is 90/60    Alpha thalassemia trait  Migraine  Iron deficiency anemia  Normal H&H on 10/26/2023      History of arrhythmias ?  Patient attributed that to stress    Family history of WPW in her sister    RECOMMENDATIONS:  Echocardiogram  30-day ambulatory cardiac monitoring  Serum cortisol  TSH  ACTH stimulation test            Thank you for your consultation.  If you have any question please call me at [phone]      Primary Care Physician Requesting Consult: Abdoulaye Alexander MD      Reason for Consult / Principal Problem: Chronic hypotension        HPI :     Angela Martin is a 38 y.o. year old female who was referred by primary care doctor for management of chronic hypotension.  According to the patient she recalls having low blood pressure throughout her adult life and at times when it gets lower than 90/60 she feels weak and dizzy and has difficulty concentrating and functioning.  She also gives a history of some undiagnosed arrhythmia that she attributed to stress.  She previously saw a cardiologist in [place] and was prescribed compression stockings, increase salt intake and some medication to increase her blood pressure.  However with the first pill her blood pressure became extremely high, and since then she has not taken that medicine again.      Review of Systems   Constitutional:         Intermittent weakness   Neurological:  Positive for dizziness.   All other systems reviewed and are negative.      Historical Information   Past Medical History:   Diagnosis Date    KVNG (generalized anxiety disorder)     Migraines      Past Surgical History:   Procedure Laterality Date    TUBAL LIGATION Bilateral      Social History     Substance and Sexual Activity   Alcohol Use Not Currently     Comment: 1-2 drinks once a week     Social History     Substance and Sexual Activity   Drug Use Never     Social History     Tobacco Use   Smoking Status Never   Smokeless Tobacco Not on file     Family History:   Family History   Problem Relation Age of Onset    COPD Mother     Endometriosis Mother     Breast cancer Maternal Aunt 40       Meds/Allergies     Allergies   Allergen Reactions    Tetanus-Diphtheria Toxoids Td Swelling       Current Outpatient Medications:     ascorbic acid (VITAMIN C) 500 MG tablet, Take 1 tablet (500 mg total) by mouth 3 (three) times a week Monday, Wednesday, Friday (Patient taking differently: Take 500 mg by mouth 3 (three) times a week PRN), Disp: 30 tablet, Rfl: 3    ergocalciferol (VITAMIN D2) 50,000 units, TAKE 1 CAPSULE BY MOUTH ONE TIME PER WEEK FOR 8 DOSES, Disp: 12 capsule, Rfl: 1    ferrous sulfate 324 (65 Fe) mg, Take 1 tablet (324 mg total) by mouth 3 (three) times a week Monday, Wednesday, Friday, Disp: 30 tablet, Rfl: 3    hydrOXYzine pamoate (VISTARIL) 25 mg capsule, TAKE 1 CAPSULE (25 MG TOTAL) BY MOUTH DAILY AT BEDTIME AS NEEDED FOR ANXIETY, Disp: 90 capsule, Rfl: 1    rizatriptan (MAXALT) 5 mg tablet, TAKE 1 TABLET (5 MG TOTAL) BY MOUTH AS NEEDED FOR MIGRAINE TAKE AT THE ONSET OF MIGRAINE IF SYMPTOMS CONTINUE OR RETURN, MAY TAKE ANOTHER DOSE AT LEAST 2 HOURS AFTER FIRST DOSE. TAKE NO MORE THAN 2 DOSES IN A DAY. (Patient not taking: Reported on 1/4/2024), Disp: 4 tablet, Rfl: 2    Vitals: Blood pressure 90/60, pulse 78, weight 63.5 kg (140 lb), SpO2 99%.    Body mass index is 25.61 kg/m².  Vitals:    01/04/24 0750   Weight: 63.5 kg (140 lb)     BP Readings from Last 3 Encounters:   01/04/24 90/60   12/05/23 108/57   11/28/23 103/56       Physical Exam  PHYSICAL EXAMINATION:  Neurologic:  Alert & oriented x 3, no new focal deficits, Not in any acute distress,  Constitutional:  Well developed, well nourished, non-toxic appearance   Eyes:  Pupil equal and reacting to  "light, conjunctiva normal, No JVP, No LNP   HENT:  Atraumatic, oropharynx moist, Neck- normal range of motion, no tenderness,  Neck supple   Respiratory:  Bilateral air entry, mostly clear to auscultation  Cardiovascular: S1-S2 regular rhythm  GI:  Soft, nondistended, normal bowel sounds, nontender, no hepatosplenomegaly appreciated.  Musculoskeletal: no tenderness, no deformities.   Skin:  Well hydrated, no rash   Lymphatic:  No lymphadenopathy noted   Extremities:  No edema and distal pulses are present    Diagnostic Studies Review Cardio:      EKG: Normal sinus rhythm with sinus arrhythmia, heart rate 78/min    Cardiac testing:   No results found for this or any previous visit.        Imaging:  Chest X-Ray:   No Chest XR results available for this patient.    CT-scan of the chest:     No CTA results available for this patient.  Lab Review   Lab Results   Component Value Date    WBC 5.41 10/26/2023    HGB 12.8 10/26/2023    HCT 39.2 10/26/2023    MCV 82 10/26/2023    RDW 13.2 10/26/2023     10/26/2023     BMP:  Lab Results   Component Value Date    SODIUM 139 11/06/2023    K 3.8 11/06/2023     11/06/2023    CO2 29 11/06/2023    BUN 13 11/06/2023    CREATININE 0.62 11/06/2023    GLUF 83 11/06/2023    CALCIUM 8.6 11/06/2023    EGFR 113 11/06/2023     LFT:  Lab Results   Component Value Date    AST 14 11/06/2023    ALT 9 11/06/2023    ALKPHOS 48 11/06/2023    TP 6.6 11/06/2023    ALB 4.3 11/06/2023      Lab Results   Component Value Date    RBP9JKSGJHOP 2.343 10/26/2023     No components found for: \"TSH3\"  Lab Results   Component Value Date    HGBA1C 5.1 10/27/2022     Lipid Profile:   Lab Results   Component Value Date    CHOLESTEROL 177 11/06/2023    HDL 49 (L) 11/06/2023    LDLCALC 114 (H) 11/06/2023    TRIG 69 11/06/2023     Lab Results   Component Value Date    CHOLESTEROL 177 11/06/2023    CHOLESTEROL 190 10/27/2022     No results found for: \"CKTOTAL\", \"CKMB\", \"CKMBINDEX\", \"TROPONINI\"  No results " "found for: \"NTBNP\"   No results found for this or any previous visit (from the past 672 hour(s)).        Dr. Job Horton MD, [facility]      \"This note has been constructed using a voice recognition system.Therefore there may be syntax, spelling, and/or grammatical errors. Please call if you have any questions. \"  "

## 2024-01-04 ENCOUNTER — TELEPHONE (OUTPATIENT)
Dept: CARDIOLOGY CLINIC | Facility: CLINIC | Age: 39
End: 2024-01-04

## 2024-01-04 ENCOUNTER — CONSULT (OUTPATIENT)
Dept: CARDIOLOGY CLINIC | Facility: CLINIC | Age: 39
End: 2024-01-04
Payer: COMMERCIAL

## 2024-01-04 VITALS
HEART RATE: 78 BPM | BODY MASS INDEX: 25.61 KG/M2 | OXYGEN SATURATION: 99 % | SYSTOLIC BLOOD PRESSURE: 90 MMHG | DIASTOLIC BLOOD PRESSURE: 60 MMHG | WEIGHT: 140 LBS

## 2024-01-04 DIAGNOSIS — I95.9 HYPOTENSION, UNSPECIFIED HYPOTENSION TYPE: ICD-10-CM

## 2024-01-04 DIAGNOSIS — R42 DIZZINESS: Primary | ICD-10-CM

## 2024-01-04 PROCEDURE — 93000 ELECTROCARDIOGRAM COMPLETE: CPT | Performed by: INTERNAL MEDICINE

## 2024-01-04 PROCEDURE — 99213 OFFICE O/P EST LOW 20 MIN: CPT | Performed by: INTERNAL MEDICINE

## 2024-01-09 ENCOUNTER — APPOINTMENT (OUTPATIENT)
Dept: LAB | Facility: HOSPITAL | Age: 39
End: 2024-01-09
Payer: COMMERCIAL

## 2024-01-09 ENCOUNTER — TELEPHONE (OUTPATIENT)
Dept: CARDIOLOGY CLINIC | Facility: CLINIC | Age: 39
End: 2024-01-09

## 2024-01-09 DIAGNOSIS — I95.9 HYPOTENSION, UNSPECIFIED HYPOTENSION TYPE: ICD-10-CM

## 2024-01-09 LAB
CORTIS AM PEAK SERPL-MCNC: 11.6 UG/DL (ref 6.7–22.6)
TSH SERPL DL<=0.05 MIU/L-ACNC: 2.01 UIU/ML (ref 0.45–4.5)

## 2024-01-09 PROCEDURE — 82533 TOTAL CORTISOL: CPT

## 2024-01-09 PROCEDURE — 82024 ASSAY OF ACTH: CPT

## 2024-01-09 PROCEDURE — 36415 COLL VENOUS BLD VENIPUNCTURE: CPT

## 2024-01-09 PROCEDURE — 84443 ASSAY THYROID STIM HORMONE: CPT | Performed by: INTERNAL MEDICINE

## 2024-01-09 NOTE — TELEPHONE ENCOUNTER
Outpatient lab is question lab work ordered   Androstenedione, ACTH Stimul.^L   He said he thinks it was ordered incorrectly or it may have to be done in infusion. Please assist. Their phone number is [phone]

## 2024-01-09 NOTE — TELEPHONE ENCOUNTER
----- Message from Job Horton MD sent at 1/9/2024  3:33 PM EST -----  Please call and inform patient that the blood test showed that her serum cortisol level is normal

## 2024-01-09 NOTE — TELEPHONE ENCOUNTER
----- Message from Job Horton MD sent at 1/9/2024 11:16 AM EST -----  Please call and inform patient that the blood test showed that her thyroid function is normal

## 2024-01-10 LAB — ACTH PLAS-MCNC: 30.2 PG/ML (ref 7.2–63.3)

## 2024-01-11 ENCOUNTER — TELEPHONE (OUTPATIENT)
Dept: CARDIOLOGY CLINIC | Facility: CLINIC | Age: 39
End: 2024-01-11

## 2024-01-11 ENCOUNTER — HOSPITAL ENCOUNTER (OUTPATIENT)
Dept: NON INVASIVE DIAGNOSTICS | Facility: HOSPITAL | Age: 39
Discharge: HOME | End: 2024-01-11
Attending: INTERNAL MEDICINE
Payer: COMMERCIAL

## 2024-01-11 VITALS
HEIGHT: 62 IN | WEIGHT: 140 LBS | BODY MASS INDEX: 25.76 KG/M2 | DIASTOLIC BLOOD PRESSURE: 68 MMHG | HEART RATE: 67 BPM | SYSTOLIC BLOOD PRESSURE: 105 MMHG

## 2024-01-11 DIAGNOSIS — I95.9 HYPOTENSION, UNSPECIFIED HYPOTENSION TYPE: ICD-10-CM

## 2024-01-11 LAB — BSA FOR ECHO PROCEDURE: 1.64 M2

## 2024-01-11 PROCEDURE — 93306 TTE W/DOPPLER COMPLETE: CPT | Performed by: INTERNAL MEDICINE

## 2024-01-11 PROCEDURE — 93306 TTE W/DOPPLER COMPLETE: CPT

## 2024-01-11 NOTE — TELEPHONE ENCOUNTER
----- Message from Job Horton MD sent at 1/11/2024  8:55 AM EST -----  Please call and inform the patient that the Echocardiogram showed normal pumping function of the heart.    No significant valve abnormality was seen.

## 2024-02-10 ENCOUNTER — TELEPHONE (OUTPATIENT)
Dept: OTHER | Facility: OTHER | Age: 39
End: 2024-02-10

## 2024-02-10 NOTE — TELEPHONE ENCOUNTER
Reny with [facility] called to report Dr. Carver stated to reach out to marco on call. Provider has been messaged via TT.

## 2024-02-16 ENCOUNTER — CLINICAL SUPPORT (OUTPATIENT)
Dept: CARDIOLOGY CLINIC | Facility: CLINIC | Age: 39
End: 2024-02-16
Payer: COMMERCIAL

## 2024-02-16 DIAGNOSIS — R42 DIZZINESS: ICD-10-CM

## 2024-02-16 PROCEDURE — 93228 REMOTE 30 DAY ECG REV/REPORT: CPT | Performed by: INTERNAL MEDICINE

## 2024-02-19 ENCOUNTER — TELEPHONE (OUTPATIENT)
Dept: CARDIOLOGY CLINIC | Facility: CLINIC | Age: 39
End: 2024-02-19

## 2024-02-19 NOTE — TELEPHONE ENCOUNTER
----- Message from Job Horton MD sent at 2/16/2024 12:52 PM EST -----  Ambulatory cardiac monitoring report 02/16/2024  Indication: Dizziness     Underlying rhythm was sinus with average heart rate of 80, and range of  bpm  No significant ectopy or arrhythmia was documented  There were 6 patient triggered episodes for dizziness, lightheadedness, shortness of breath which corresponded to normal sinus rhythm.  Heart racing corresponded to normal sinus rhythm at 82 bpm and mild sinus tachycardia at 109 bpm

## 2024-02-23 ENCOUNTER — OFFICE VISIT (OUTPATIENT)
Dept: URGENT CARE | Facility: CLINIC | Age: 39
End: 2024-02-23
Payer: COMMERCIAL

## 2024-02-23 VITALS
DIASTOLIC BLOOD PRESSURE: 68 MMHG | TEMPERATURE: 98.6 F | WEIGHT: 139.4 LBS | OXYGEN SATURATION: 97 % | RESPIRATION RATE: 18 BRPM | BODY MASS INDEX: 25.5 KG/M2 | SYSTOLIC BLOOD PRESSURE: 104 MMHG | HEART RATE: 100 BPM

## 2024-02-23 DIAGNOSIS — J06.9 VIRAL URI WITH COUGH: Primary | ICD-10-CM

## 2024-02-23 PROCEDURE — 99213 OFFICE O/P EST LOW 20 MIN: CPT | Performed by: FAMILY MEDICINE

## 2024-02-23 PROCEDURE — 87636 SARSCOV2 & INF A&B AMP PRB: CPT | Performed by: FAMILY MEDICINE

## 2024-02-23 RX ORDER — PREDNISONE 20 MG/1
20 TABLET ORAL 2 TIMES DAILY WITH MEALS
Qty: 8 TABLET | Refills: 0 | Status: SHIPPED | OUTPATIENT
Start: 2024-02-23

## 2024-02-23 NOTE — PROGRESS NOTES
[facility]        NAME: Angela Martin is a 38 y.o. female  : 1985    MRN: [id number]  DATE: 2024  TIME: 11:29 AM    Assessment and Plan   Viral URI with cough [J06.9]  1. Viral URI with cough  predniSONE 20 mg tablet    Covid19 and INFLUENZA A/B PCR            Patient Instructions     Flu/COVID swab collected today and sent to the pharmacy. Results will likely return in 24 hours. Quarantine guidelines discussed. OTC supplements/medications discussed. Follow-up with PCP in the next 1-2 days for re-evaluation if symptoms continue or worsen. Go to the ED if any fevers, unable to stay hydrated, abdominal pain, chest pain, shortness of breath, wheezing, chest tightness, cough or cold symptoms, new or worsening symptoms or other concerning symptoms.     Chief Complaint     Chief Complaint   Patient presents with   • Cough     Started with symptoms on Saturday. Temp reported yesterday 100.8. taking otc cold and flu , last dose yesterday. Home covid test 2 days ago negative. Decreased appetite  and chest pain with cough   • Fever   • Headache   • Sore Throat         History of Present Illness     Angela Martin is a 38 y.o. female presenting to the office today for upper respiratory complaints. Symptoms have been present for 6 days, and include cough, congestion, sore throat, headache and muscle aches.   She has tried OTC decongestants for her symptoms, with no relief.  Sick contacts include: kids sick last week. COVID -    Review of Systems     Review of Systems   Constitutional:  Positive for chills and fatigue. Negative for fever.   HENT:  Positive for congestion, postnasal drip and sore throat. Negative for ear discharge, ear pain, sinus pressure and sinus pain.    Eyes:  Negative for pain and discharge.   Respiratory:  Positive for cough. Negative for shortness of breath.    Cardiovascular:  Negative for chest pain and palpitations.   Gastrointestinal:  Negative for abdominal pain,  diarrhea, nausea and vomiting.   Genitourinary:  Negative for difficulty urinating and dysuria.   Musculoskeletal:  Negative for arthralgias and myalgias.   Skin:  Negative for rash.   Neurological:  Negative for dizziness, syncope, light-headedness, numbness and headaches.   Psychiatric/Behavioral:  Negative for agitation.    All other systems reviewed and are negative.      Current Medications       Current Outpatient Medications:   •  ascorbic acid (VITAMIN C) 500 MG tablet, Take 1 tablet (500 mg total) by mouth 3 (three) times a week Monday, Wednesday, Friday (Patient taking differently: Take 500 mg by mouth 3 (three) times a week PRN), Disp: 30 tablet, Rfl: 3  •  ergocalciferol (VITAMIN D2) 50,000 units, TAKE 1 CAPSULE BY MOUTH ONE TIME PER WEEK FOR 8 DOSES, Disp: 12 capsule, Rfl: 1  •  ferrous sulfate 324 (65 Fe) mg, Take 1 tablet (324 mg total) by mouth 3 (three) times a week Monday, Wednesday, Friday, Disp: 30 tablet, Rfl: 3  •  predniSONE 20 mg tablet, Take 1 tablet (20 mg total) by mouth 2 (two) times a day with meals, Disp: 8 tablet, Rfl: 0  •  hydrOXYzine pamoate (VISTARIL) 25 mg capsule, TAKE 1 CAPSULE (25 MG TOTAL) BY MOUTH DAILY AT BEDTIME AS NEEDED FOR ANXIETY, Disp: 90 capsule, Rfl: 1  •  rizatriptan (MAXALT) 5 mg tablet, TAKE 1 TABLET (5 MG TOTAL) BY MOUTH AS NEEDED FOR MIGRAINE TAKE AT THE ONSET OF MIGRAINE IF SYMPTOMS CONTINUE OR RETURN, MAY TAKE ANOTHER DOSE AT LEAST 2 HOURS AFTER FIRST DOSE. TAKE NO MORE THAN 2 DOSES IN A DAY. (Patient not taking: Reported on 1/4/2024), Disp: 4 tablet, Rfl: 2    Current Allergies     Allergies as of 02/23/2024 - Reviewed 02/23/2024   Allergen Reaction Noted   • Tetanus-diphtheria toxoids td Swelling 12/09/2018            The following portions of the patient's history were reviewed and updated as appropriate: allergies, current medications, past family history, past medical history, past social history, past surgical history and problem list.     Past Medical  History:   Diagnosis Date   • KVNG (generalized anxiety disorder)    • Migraines        Past Surgical History:   Procedure Laterality Date   • TUBAL LIGATION Bilateral 2012       Family History   Problem Relation Age of Onset   • COPD Mother    • Endometriosis Mother    • Breast cancer Maternal Aunt 40       Medications have been verified.    Objective     /68   Pulse 100   Temp 98.6 °F (37 °C) (Tympanic)   Resp 18   Wt 63.2 kg (139 lb 6.4 oz)   SpO2 97%   BMI 25.50 kg/m²   No LMP recorded.     Physical Exam     Physical Exam  Vitals reviewed.   Constitutional:       General: She is not in acute distress.     Appearance: Normal appearance. She is not ill-appearing.   HENT:      Head: Normocephalic and atraumatic.      Right Ear: Tympanic membrane and ear canal normal. No middle ear effusion.      Left Ear: Tympanic membrane and ear canal normal.  No middle ear effusion.      Mouth/Throat:      Mouth: Mucous membranes are moist.      Pharynx: Posterior oropharyngeal erythema present. No oropharyngeal exudate.      Tonsils: No tonsillar exudate.   Eyes:      Extraocular Movements: Extraocular movements intact.      Conjunctiva/sclera: Conjunctivae normal.      Pupils: Pupils are equal, round, and reactive to light.   Cardiovascular:      Rate and Rhythm: Normal rate and regular rhythm.      Pulses: Normal pulses.      Heart sounds: Normal heart sounds. No murmur heard.  Pulmonary:      Effort: Pulmonary effort is normal. No respiratory distress.      Breath sounds: Normal breath sounds. No wheezing.   Musculoskeletal:      Cervical back: Normal range of motion and neck supple. No tenderness.   Skin:     General: Skin is warm.   Neurological:      General: No focal deficit present.      Mental Status: She is alert.   Psychiatric:         Mood and Affect: Mood normal.         Behavior: Behavior normal.         Judgment: Judgment normal.

## 2024-02-24 LAB
FLUAV RNA RESP QL NAA+PROBE: NEGATIVE
FLUBV RNA RESP QL NAA+PROBE: NEGATIVE
SARS-COV-2 RNA RESP QL NAA+PROBE: NEGATIVE

## 2024-05-01 ENCOUNTER — TELEPHONE (OUTPATIENT)
Dept: HEMATOLOGY ONCOLOGY | Facility: MEDICAL CENTER | Age: 39
End: 2024-05-01

## 2024-05-01 NOTE — TELEPHONE ENCOUNTER
Left message on patient's phone indicating to have labs drawn prior to appointment.  Indicated that the scripts are in the system, the tests are non-fasting and that patient can go to any [facility] facility to have the labs drawn.  Provided callback number [phone].

## 2024-05-02 ENCOUNTER — APPOINTMENT (OUTPATIENT)
Dept: LAB | Facility: HOSPITAL | Age: 39
End: 2024-05-02
Payer: COMMERCIAL

## 2024-05-02 DIAGNOSIS — E61.1 IRON DEFICIENCY: ICD-10-CM

## 2024-05-02 LAB
BASOPHILS # BLD AUTO: 0.06 THOUSANDS/ÂΜL (ref 0–0.1)
BASOPHILS NFR BLD AUTO: 1 % (ref 0–1)
EOSINOPHIL # BLD AUTO: 0.1 THOUSAND/ÂΜL (ref 0–0.61)
EOSINOPHIL NFR BLD AUTO: 2 % (ref 0–6)
ERYTHROCYTE [DISTWIDTH] IN BLOOD BY AUTOMATED COUNT: 13.7 % (ref 11.6–15.1)
FERRITIN SERPL-MCNC: 88 NG/ML (ref 11–307)
HCT VFR BLD AUTO: 41.2 % (ref 34.8–46.1)
HGB BLD-MCNC: 13.3 G/DL (ref 11.5–15.4)
IMM GRANULOCYTES # BLD AUTO: 0.01 THOUSAND/UL (ref 0–0.2)
IMM GRANULOCYTES NFR BLD AUTO: 0 % (ref 0–2)
IRON SATN MFR SERPL: 22 % (ref 15–50)
IRON SERPL-MCNC: 69 UG/DL (ref 50–212)
LYMPHOCYTES # BLD AUTO: 1.61 THOUSANDS/ÂΜL (ref 0.6–4.47)
LYMPHOCYTES NFR BLD AUTO: 30 % (ref 14–44)
MCH RBC QN AUTO: 26.1 PG (ref 26.8–34.3)
MCHC RBC AUTO-ENTMCNC: 32.3 G/DL (ref 31.4–37.4)
MCV RBC AUTO: 81 FL (ref 82–98)
MONOCYTES # BLD AUTO: 0.44 THOUSAND/ÂΜL (ref 0.17–1.22)
MONOCYTES NFR BLD AUTO: 8 % (ref 4–12)
NEUTROPHILS # BLD AUTO: 3.18 THOUSANDS/ÂΜL (ref 1.85–7.62)
NEUTS SEG NFR BLD AUTO: 59 % (ref 43–75)
NRBC BLD AUTO-RTO: 0 /100 WBCS
PLATELET # BLD AUTO: 238 THOUSANDS/UL (ref 149–390)
PMV BLD AUTO: 11.6 FL (ref 8.9–12.7)
RBC # BLD AUTO: 5.1 MILLION/UL (ref 3.81–5.12)
TIBC SERPL-MCNC: 314 UG/DL (ref 250–450)
UIBC SERPL-MCNC: 245 UG/DL (ref 155–355)
WBC # BLD AUTO: 5.4 THOUSAND/UL (ref 4.31–10.16)

## 2024-05-02 PROCEDURE — 85025 COMPLETE CBC W/AUTO DIFF WBC: CPT

## 2024-05-02 PROCEDURE — 83540 ASSAY OF IRON: CPT

## 2024-05-02 PROCEDURE — 36415 COLL VENOUS BLD VENIPUNCTURE: CPT

## 2024-05-02 PROCEDURE — 83550 IRON BINDING TEST: CPT

## 2024-05-02 PROCEDURE — 82728 ASSAY OF FERRITIN: CPT

## 2024-05-08 ENCOUNTER — OFFICE VISIT (OUTPATIENT)
Dept: OBGYN CLINIC | Facility: CLINIC | Age: 39
End: 2024-05-08
Payer: COMMERCIAL

## 2024-05-08 ENCOUNTER — APPOINTMENT (OUTPATIENT)
Dept: RADIOLOGY | Facility: CLINIC | Age: 39
End: 2024-05-08
Payer: COMMERCIAL

## 2024-05-08 VITALS
BODY MASS INDEX: 25.58 KG/M2 | SYSTOLIC BLOOD PRESSURE: 106 MMHG | DIASTOLIC BLOOD PRESSURE: 70 MMHG | HEART RATE: 83 BPM | WEIGHT: 139 LBS | HEIGHT: 62 IN

## 2024-05-08 DIAGNOSIS — G89.29 CHRONIC RIGHT SHOULDER PAIN: Primary | ICD-10-CM

## 2024-05-08 DIAGNOSIS — M75.41 IMPINGEMENT SYNDROME OF RIGHT SHOULDER: ICD-10-CM

## 2024-05-08 DIAGNOSIS — M25.511 CHRONIC RIGHT SHOULDER PAIN: ICD-10-CM

## 2024-05-08 DIAGNOSIS — M25.511 CHRONIC RIGHT SHOULDER PAIN: Primary | ICD-10-CM

## 2024-05-08 DIAGNOSIS — G89.29 CHRONIC RIGHT SHOULDER PAIN: ICD-10-CM

## 2024-05-08 PROCEDURE — 73030 X-RAY EXAM OF SHOULDER: CPT

## 2024-05-08 PROCEDURE — 20610 DRAIN/INJ JOINT/BURSA W/O US: CPT | Performed by: ORTHOPAEDIC SURGERY

## 2024-05-08 PROCEDURE — 99203 OFFICE O/P NEW LOW 30 MIN: CPT | Performed by: ORTHOPAEDIC SURGERY

## 2024-05-08 RX ADMIN — TRIAMCINOLONE ACETONIDE 40 MG: 40 INJECTION, SUSPENSION INTRA-ARTICULAR; INTRAMUSCULAR at 15:45

## 2024-05-08 RX ADMIN — LIDOCAINE HYDROCHLORIDE 4 ML: 10 INJECTION, SOLUTION INFILTRATION; PERINEURAL at 15:45

## 2024-05-08 NOTE — PATIENT INSTRUCTIONS
EXERCISES FOR SHOULDER REHABILITATION: INCREASED FLEXIBILITY AND STRENGTH     All of the exercises listed are to be done with slow and steady movements and controlled breathing. Do only what you feel comfortable doing.   1. CODMAN’S/ PENDULUM       1. Lean forward and place one hand on a counter or table for support. Let your other arm hang freely at your side.  2. Gently swing your arm forward and back. Repeat the exercise moving your arm side-to-side, and repeat again in a circular motion.  3. Keep from rounding your back or locking your knees.  4. Repeat 10 times.  5. Complete 2-3 sets.  2. CROSSOVER ARM STRETCH       1. Standing upright, relax your shoulders and pull one arm across your body as far as possible. Hold at your upper arm.  2. Hold the stretch for 15-30 seconds.  3. Repeat the stretch for your other arm.  4. Repeat the stretch 3 times for each arm.  3. ACTIVE ASSISTIVE ROM WITH STICK     1. Keep your affected arm relaxed, do not lift your affected arm on its own.  2. Move through the motions slowly.    Flexion:  1. Hold a stick with your hands shoulder width apart.  2. Slowly raise your arms up out in front of you. Relax your affected arm allowing your unaffected arm to lift your affected arm.  3. After holding for 3-5 seconds at the end range, slowly return back down.  4. Repeat 10 times.    Extension:   1. Hold a stick at your side with your affected arm at your side.  2. Slowly push your affected arm backwards behind you. Keep your body upright and your affected arm relaxed.  3. After holding for 3-5 seconds at the end range, slowly return back down.  4. Repeat 10 times.    Abduction:  1. Hold a stick with your hands shoulder width apart.  2. Slowly push your affected arm to the side of you. Completely relax your affected arm.  3. After holding for 3-5 seconds at the end range, slowly return back down.  4. Repeat 10 times.    Internal Rotation/Extension:  1. Hold a stick with your hands as close  as possible behind your body.  2. Slowly raise your affected arm up, bringing your affected arm up with it. Relax your affected arm as much as possible.  3. After holding for 3-5 seconds at the end range, slowly return back down.  4. Repeat 10 times.    Passive Internal Rotation:  1. Hold a stick with your hands shoulder width apart behind your back.  2. Slowly pull your affected arm behind your body. Completely relax your affected arm.  3. After holding for 3-5 seconds at the end range, slowly return back down.  4. Repeat 10 times.     Passive External Rotation:  1. Hold a stick with one hand and cup the other end of the stick with your other hand.  2. Slowly push your affected arm outward horizontally.  3. After holding for 3-5 seconds at the end range, slowly return back down.  4. Repeat 10 times.  4. TOWEL STRETCH INTERNAL ROTATION / EXTENSION       1. Hold a towel behind your back. Affected arm at the bottom.  2. Slowly elevate your affected arm by pulling up with your unaffected arm.  3. Hold for 20-30 seconds at the maximum pain free range, then relax for 30 seconds.  4. Repeat 3-6 times.  5. SLEEPER STRETCH       1. Lay on your side on a firm surface with your affected arm under you as shown. Flex your elbow to 90 degrees.  2. Slowly press down on your forearm with the opposite arm, stopping when you feel a stretch.  3. Hold for 20-30 seconds at the maximum pain free range, then relax for 30 seconds.  4. Repeat 3-6 times.  6. STANDING ROW       1. Attach a band to a doorknob or other steady surface. You may tie the ends of the band together to create a loop.  2. Stand upright with your arm at a 90 degree angle at your side.  3. Keeping your arm tucked at your side, slowly pull your elbow straight backwards.  4. Slowly return to the start position, repeat 8-12 times.  5. Complete 3 sets.  7. EXTERNAL ROTATION WITH ARM ABDUCTED 90°       1. Attach a band to a doorknob or other steady surface. You may tie the  ends of the band together to create a loop.  2. Stand upright with your arm at a 90 degree angle and at shoulder height.  3. Keeping your shoulder and elbow at an even level, slowly raise your hand until it is facing upwards, or even with your head.  4. Slowly return to the start position, repeat 8-12 times.  5. Complete 3 sets.  8. INTERNAL ROTATION WITH BAND       1. Attach a band to a doorknob or other steady surface. You may tie the ends of the band together to create a loop.  2. Stand perpendicular to the band with your arm at a 90 degree angle and tucked at your side.  3. Keeping your elbow tucked, slowly rotate your hand inward.  4. Slowly return to the start position, repeat 8-12 times.  5. Complete 3 sets.  9. EXTERNAL ROTATION WITH BAND       1. Attach a band to a doorknob or other steady surface. You may tie the ends of the band together to create a loop.  2. Stand perpendicular to the band with your arm at a 90 degree angle and tucked at your side.  3. Keeping your elbow tucked, slowly rotate your hand outward.  4. Slowly return to the start position, repeat 8-12 times.  5. Complete 3 sets.  10. ELBOW FLEXION (BICEP CURL)       1. Standing upright hold a dumbbell in each hand.  2. Keeping your elbow close to your side slowly raise the weight upwards toward your shoulder.  3. Avoid swinging your arm or using momentum.  4. Repeat for 8-12 repetitions.  5. Complete 3 sets.  11. ELBOW EXTENSION (OVERHEAD TRICEP EXTENSION)       1. Standing upright hold a dumbbell over your head. Support your arm by holding your opposite hand on your upper arm.  2. Slowly straighten your elbow and raise the weight overhead.  3. Repeat for 8-12 repetions.  4. Complete 3 sets.  12. STRAIGHT ARM DUMBBELL ROW       1. Place your knee or a chair or bench and lean forward so your that your hand supports your weight. Use a light weight (1-7lbs).  2. Slowly raise the weight behind you parallel to the floor, rotating your hand to a  thumbs-up position. Keep your arm straight.  3. Repeat 15-20 times.  4. Complete 3-4 sets.  13. SCAPULA SETTING       1. Lay on your stomach with your arms at your side. Palms facing downwards.  2. Slowly draw your shoulder blades together and down your back.  3. Ease about USP off this position and hold for 10 seconds, then relax for 10 seconds.  4. Repeat 10 times.  14. SCAPULAR RETRACTION/PROTRACTION       1. Lay on your stomach on an edge with your affected arm hanging off the side.  2. Slowly raise your arm keeping your elbow straight by drawing your shoulder blade to the other side. You are not raising your arm straight out to your side, but elevating your arm.  3. Repeat 10 times.  4. Complete 2 sets.  15. BENT-OVER HORIZONTAL ABDUCTION       1. Lay on your stomach on an edge with your affected arm hanging off the side.  2. Slowly raise your arm keeping your elbow straight by raising your arm out to your side. Control the movement.  3. Repeat 10 times.  4. Complete 2 sets.  16. INTERNAL AND EXTERNAL ROTATION       1. Lay on your back on a steady surface.  2. Raise your arm to 90 degrees and lift your fingers to face upwards.  3. Keeping your arm bend, slowly move your arm as shown.  4. Bring your arm to a smaller angle (45 degrees) if 90 degrees hurts.  5. Repeat 20 times.  6. Complete 3-4 sets.  17. EXTERNAL ROTATION       1. Lay on your side on a steady surface with your unaffected arm cradling your head.  2. Hold your arm at a 90 degree angle, keeping your affected arms elbow tucked at your side.  3. Slowly raise your arm to a vertical position and lower the weight slowly.  4. Repeat 10 times.  5. Complete 2-3 sets.  18. INTERNAL ROTATION       1. Lay on your side on a flat surface on the side of the affected arm  2. Hold your arm at a 90 degree angle, keeping your affected arms elbow tucked at your side.  3. Slowly raise your arm to a vertical position and lower the weight slowly.  4. Repeat 10  times.  5. Complete 2-3 sets.

## 2024-05-08 NOTE — PROGRESS NOTES
"Assessment/Plan:  1. Chronic right shoulder pain  XR shoulder 2+ vw right      2. Impingement syndrome of right shoulder  Ambulatory Referral to Physical Therapy    Large joint arthrocentesis: R subacromial bursa    lidocaine (XYLOCAINE) 1 % injection 4 mL    triamcinolone acetonide (KENALOG-40) 40 mg/mL injection 40 mg        The patient appears to have bursitis/ impingement of the right shoulder. She has fairly good strength on examination though. We discussed conservative treatment options including physical therapy, anti-inflammatories, and possible corticosteroid injections.  She opted for a home exercise program instead of formal PT. I did place a PT script in her chart in case she changes her mind and would like to do formal PT. She will try topical voltaren as oral anti-inflammatories have not helped her much. She consented to and tolerated well right subacromial corticosteroid injection today.    If she fails to make progress we discussed possible MRI of the shoulder. She will FU in 6 weeks for repeat evaluation.     Large joint arthrocentesis: R subacromial bursa  Universal Protocol:  Risks and benefits: risks, benefits and alternatives were discussed  Consent given by: patient  Time out: Immediately prior to procedure a \"time out\" was called to verify the correct patient, procedure, equipment, support staff and site/side marked as required.  Site marked: the operative site was marked  Supporting Documentation  Indications: pain   Procedure Details  Location: shoulder - R subacromial bursa  Preparation: Patient was prepped and draped in the usual sterile fashion  Needle size: 22 G  Ultrasound guidance: no  Approach: posterior  Medications administered: 4 mL lidocaine 1 %; 40 mg triamcinolone acetonide 40 mg/mL    Patient tolerance: patient tolerated the procedure well with no immediate complications  Dressing:  Sterile dressing applied          Subjective:   Angela Martin is a 38 y.o. female who presents " today for evaluation of her right shoulder. This started to bother her about a year ago, but has gotten worse over the last 3-4 months. She denies any major trauma prior to the onset of her symptoms, but she thinks this may have started after doing some foam sword fighting. Most of her pain is about the posterior and lateral shoulder, and is worse with activity and reaching for things. It is also starting to bother her with sleeping.  She notes some limitation in ROM of the shoulder as well. She denies any paresthesias of the upper extremity.       Review of Systems   Constitutional: Negative.  Negative for chills and fever.   HENT: Negative.  Negative for ear pain and sore throat.    Eyes: Negative.  Negative for pain and redness.   Respiratory: Negative.  Negative for shortness of breath and wheezing.    Cardiovascular:  Negative for chest pain and palpitations.   Gastrointestinal: Negative.  Negative for abdominal pain and blood in stool.   Endocrine: Negative.  Negative for polydipsia and polyuria.   Genitourinary: Negative.  Negative for difficulty urinating and dysuria.   Musculoskeletal:         As noted in HPI   Skin: Negative.  Negative for pallor and rash.   Neurological: Negative.  Negative for dizziness and numbness.   Hematological: Negative.  Negative for adenopathy. Does not bruise/bleed easily.   Psychiatric/Behavioral: Negative.  Negative for confusion and suicidal ideas.          Past Medical History:   Diagnosis Date   • ADHD (attention deficit hyperactivity disorder)    • KVNG (generalized anxiety disorder)    • Migraines        Past Surgical History:   Procedure Laterality Date   • KIDNEY SURGERY  2003    Hydronephorsis while pregnant (pardon the spelling)   • TUBAL LIGATION Bilateral 2012       Family History   Problem Relation Age of Onset   • COPD Mother    • Endometriosis Mother    • Diabetes Mother    • Learning disabilities Mother    • Neurological problems Mother    • Breast cancer  Maternal Aunt 40   • Learning disabilities Sister    • Neurological problems Sister        Social History     Occupational History   • Not on file   Tobacco Use   • Smoking status: Never   • Smokeless tobacco: Never   Vaping Use   • Vaping status: Never Used   Substance and Sexual Activity   • Alcohol use: Not Currently     Alcohol/week: 1.0 standard drink of alcohol     Types: 1 Standard drinks or equivalent per week     Comment: Drink socially   • Drug use: Never   • Sexual activity: Yes     Partners: Male     Birth control/protection: Female Sterilization     Comment: 12 years          Current Outpatient Medications:   •  ascorbic acid (VITAMIN C) 500 MG tablet, Take 1 tablet (500 mg total) by mouth 3 (three) times a week Monday, Wednesday, Friday (Patient taking differently: Take 500 mg by mouth 3 (three) times a week PRN), Disp: 30 tablet, Rfl: 3  •  ergocalciferol (VITAMIN D2) 50,000 units, TAKE 1 CAPSULE BY MOUTH ONE TIME PER WEEK FOR 8 DOSES, Disp: 12 capsule, Rfl: 1  •  ferrous sulfate 324 (65 Fe) mg, Take 1 tablet (324 mg total) by mouth 3 (three) times a week Monday, Wednesday, Friday, Disp: 30 tablet, Rfl: 3  •  hydrOXYzine pamoate (VISTARIL) 25 mg capsule, TAKE 1 CAPSULE (25 MG TOTAL) BY MOUTH DAILY AT BEDTIME AS NEEDED FOR ANXIETY (Patient not taking: Reported on 5/8/2024), Disp: 90 capsule, Rfl: 1  •  predniSONE 20 mg tablet, Take 1 tablet (20 mg total) by mouth 2 (two) times a day with meals, Disp: 8 tablet, Rfl: 0  •  rizatriptan (MAXALT) 5 mg tablet, TAKE 1 TABLET (5 MG TOTAL) BY MOUTH AS NEEDED FOR MIGRAINE TAKE AT THE ONSET OF MIGRAINE IF SYMPTOMS CONTINUE OR RETURN, MAY TAKE ANOTHER DOSE AT LEAST 2 HOURS AFTER FIRST DOSE. TAKE NO MORE THAN 2 DOSES IN A DAY. (Patient not taking: Reported on 1/4/2024), Disp: 4 tablet, Rfl: 2    Allergies   Allergen Reactions   • Tetanus-Diphtheria Toxoids Td Swelling       Objective:  Vitals:    05/08/24 1542   BP: 106/70   Pulse: 83     Pain Score:    5      Right Shoulder Exam     Tenderness   The patient is experiencing no tenderness.    Range of Motion   External rotation:  50   Forward flexion:  160   Internal rotation 0 degrees:  L3   Right shoulder internal rotation 90 degrees: 5.     Muscle Strength   Right shoulder normal muscle strength: 4+/5 strength abduction.  Internal rotation: 5/5   External rotation: 5/5   Biceps: 5/5     Tests   Moran test: positive  Impingement: positive  Drop arm: negative    Other   Erythema: absent  Sensation: normal  Pulse: present    Comments:  Positive empty can test. Negative speeds test.       Left Shoulder Exam     Tenderness   The patient is experiencing no tenderness.     Range of Motion   External rotation:  60   Forward flexion:  180   Internal rotation 0 degrees:  T10     Muscle Strength   Abduction: 5/5   Internal rotation: 5/5   External rotation: 5/5   Biceps: 5/5              Physical Exam  Constitutional:       General: She is not in acute distress.     Appearance: She is well-developed.   HENT:      Head: Normocephalic and atraumatic.   Eyes:      General: No scleral icterus.     Conjunctiva/sclera: Conjunctivae normal.   Neck:      Vascular: No JVD.   Cardiovascular:      Rate and Rhythm: Normal rate.   Pulmonary:      Effort: Pulmonary effort is normal. No respiratory distress.   Musculoskeletal:      Comments: As per HPI   Skin:     General: Skin is warm.   Neurological:      Mental Status: She is alert and oriented to person, place, and time.      Coordination: Coordination normal.         I have personally reviewed pertinent films in PACS and my interpretation is as follows:  Xrays right shoulder: No acute osseous abnormality.       This document was created using speech voice recognition software.   Grammatical errors, random word insertions, pronoun errors, and incomplete sentences are an occasional consequence of this system due to software limitations, ambient noise, and hardware issues.   Any  formal questions or concerns about content, text, or information contained within the body of this dictation should be directly addressed to the provider for clarification.

## 2024-05-09 RX ORDER — LIDOCAINE HYDROCHLORIDE 10 MG/ML
4 INJECTION, SOLUTION INFILTRATION; PERINEURAL
Status: COMPLETED | OUTPATIENT
Start: 2024-05-08 | End: 2024-05-08

## 2024-05-09 RX ORDER — TRIAMCINOLONE ACETONIDE 40 MG/ML
40 INJECTION, SUSPENSION INTRA-ARTICULAR; INTRAMUSCULAR
Status: COMPLETED | OUTPATIENT
Start: 2024-05-08 | End: 2024-05-08

## 2024-06-25 ENCOUNTER — OFFICE VISIT (OUTPATIENT)
Dept: OBGYN CLINIC | Facility: CLINIC | Age: 39
End: 2024-06-25
Payer: COMMERCIAL

## 2024-06-25 VITALS
BODY MASS INDEX: 25.58 KG/M2 | HEART RATE: 80 BPM | SYSTOLIC BLOOD PRESSURE: 110 MMHG | HEIGHT: 62 IN | DIASTOLIC BLOOD PRESSURE: 71 MMHG | WEIGHT: 139 LBS

## 2024-06-25 DIAGNOSIS — M75.41 IMPINGEMENT SYNDROME OF RIGHT SHOULDER: Primary | ICD-10-CM

## 2024-06-25 DIAGNOSIS — M75.81 ROTATOR CUFF TENDONITIS, RIGHT: ICD-10-CM

## 2024-06-25 DIAGNOSIS — M75.51 SUBACROMIAL BURSITIS OF RIGHT SHOULDER JOINT: ICD-10-CM

## 2024-06-25 PROCEDURE — 99213 OFFICE O/P EST LOW 20 MIN: CPT | Performed by: ORTHOPAEDIC SURGERY

## 2024-06-25 NOTE — PROGRESS NOTES
Assessment/Plan:  1. Impingement syndrome of right shoulder  MRI shoulder right wo contrast      2. Subacromial bursitis of right shoulder joint        3. Rotator cuff tendonitis, right          The patient has ongoing pain about the shoulder despite conservative treatment thus far. As previously discussed, I do feel an MRI is warranted to rule out RTC tear. She will FU after the MRI to discuss the results and how to proceed. She may continue her home exercises in the meantime.     Subjective:   Angela Martin is a 38 y.o. female who presents today for follow-up of her right shoulder. I have been treating her conservatively for impingement of the shoulder. She had some improvement with her pain after corticosteroid injection, however this was not long lasting. She has been compliant with her home exercises but notes ongoing pain about the shoulder, which is worse with activity and better with rest. SHe has difficulty with internal rotation of the shoulder. She denies any paresthesias of the upper extremity.       Review of Systems   Constitutional: Negative.  Negative for chills and fever.   HENT: Negative.  Negative for ear pain and sore throat.    Eyes: Negative.  Negative for pain and redness.   Respiratory: Negative.  Negative for shortness of breath and wheezing.    Cardiovascular:  Negative for chest pain and palpitations.   Gastrointestinal: Negative.  Negative for abdominal pain and blood in stool.   Endocrine: Negative.  Negative for polydipsia and polyuria.   Genitourinary: Negative.  Negative for difficulty urinating and dysuria.   Musculoskeletal:         As noted in HPI   Skin: Negative.  Negative for pallor and rash.   Neurological: Negative.  Negative for dizziness and numbness.   Hematological: Negative.  Negative for adenopathy. Does not bruise/bleed easily.   Psychiatric/Behavioral: Negative.  Negative for confusion and suicidal ideas.          Past Medical History:   Diagnosis Date   • ADHD  (attention deficit hyperactivity disorder)    • KVNG (generalized anxiety disorder)    • Migraines        Past Surgical History:   Procedure Laterality Date   • KIDNEY SURGERY  2003    Hydronephorsis while pregnant (pardon the spelling)   • TUBAL LIGATION Bilateral 2012       Family History   Problem Relation Age of Onset   • COPD Mother    • Endometriosis Mother    • Diabetes Mother    • Learning disabilities Mother    • Neurological problems Mother    • Breast cancer Maternal Aunt 40   • Learning disabilities Sister    • Neurological problems Sister        Social History     Occupational History   • Not on file   Tobacco Use   • Smoking status: Never   • Smokeless tobacco: Never   Vaping Use   • Vaping status: Never Used   Substance and Sexual Activity   • Alcohol use: Not Currently     Alcohol/week: 1.0 standard drink of alcohol     Types: 1 Standard drinks or equivalent per week     Comment: Drink socially   • Drug use: Never   • Sexual activity: Yes     Partners: Male     Birth control/protection: Female Sterilization     Comment: 12 years          Current Outpatient Medications:   •  ascorbic acid (VITAMIN C) 500 MG tablet, Take 1 tablet (500 mg total) by mouth 3 (three) times a week Monday, Wednesday, Friday (Patient taking differently: Take 500 mg by mouth 3 (three) times a week PRN), Disp: 30 tablet, Rfl: 3  •  ergocalciferol (VITAMIN D2) 50,000 units, TAKE 1 CAPSULE BY MOUTH ONE TIME PER WEEK FOR 8 DOSES, Disp: 12 capsule, Rfl: 1  •  ferrous sulfate 324 (65 Fe) mg, Take 1 tablet (324 mg total) by mouth 3 (three) times a week Monday, Wednesday, Friday, Disp: 30 tablet, Rfl: 3  •  hydrOXYzine pamoate (VISTARIL) 25 mg capsule, TAKE 1 CAPSULE (25 MG TOTAL) BY MOUTH DAILY AT BEDTIME AS NEEDED FOR ANXIETY (Patient not taking: Reported on 5/8/2024), Disp: 90 capsule, Rfl: 1  •  predniSONE 20 mg tablet, Take 1 tablet (20 mg total) by mouth 2 (two) times a day with meals, Disp: 8 tablet, Rfl: 0  •   rizatriptan (MAXALT) 5 mg tablet, TAKE 1 TABLET (5 MG TOTAL) BY MOUTH AS NEEDED FOR MIGRAINE TAKE AT THE ONSET OF MIGRAINE IF SYMPTOMS CONTINUE OR RETURN, MAY TAKE ANOTHER DOSE AT LEAST 2 HOURS AFTER FIRST DOSE. TAKE NO MORE THAN 2 DOSES IN A DAY. (Patient not taking: Reported on 1/4/2024), Disp: 4 tablet, Rfl: 2    Allergies   Allergen Reactions   • Tetanus-Diphtheria Toxoids Td Swelling       Objective:  Vitals:    06/25/24 0818   BP: 110/71   Pulse: 80     Pain Score:   4      Right Shoulder Exam     Tenderness   The patient is experiencing no tenderness.    Range of Motion   Active abduction:  normal   External rotation:  90   Forward flexion:  150   Internal rotation 0 degrees:  Sacrum   Internal rotation 90 degrees:  20     Muscle Strength   Abduction: 5/5   Internal rotation: 5/5   External rotation: 5/5     Tests   Moran test: positive  Impingement: positive  Drop arm: negative    Other   Erythema: absent  Sensation: normal  Pulse: present            Physical Exam  Constitutional:       General: She is not in acute distress.     Appearance: She is well-developed.   HENT:      Head: Normocephalic and atraumatic.   Eyes:      General: No scleral icterus.     Conjunctiva/sclera: Conjunctivae normal.   Neck:      Vascular: No JVD.   Cardiovascular:      Rate and Rhythm: Normal rate.   Pulmonary:      Effort: Pulmonary effort is normal. No respiratory distress.   Musculoskeletal:      Comments: As per HPI   Skin:     General: Skin is warm.   Neurological:      Mental Status: She is alert and oriented to person, place, and time.      Coordination: Coordination normal.             This document was created using speech voice recognition software.   Grammatical errors, random word insertions, pronoun errors, and incomplete sentences are an occasional consequence of this system due to software limitations, ambient noise, and hardware issues.   Any formal questions or concerns about content, text, or information  contained within the body of this dictation should be directly addressed to the provider for clarification.

## 2024-07-10 ENCOUNTER — HOSPITAL ENCOUNTER (OUTPATIENT)
Dept: RADIOLOGY | Facility: HOSPITAL | Age: 39
Discharge: HOME | End: 2024-07-10
Payer: COMMERCIAL

## 2024-07-10 DIAGNOSIS — M75.41 IMPINGEMENT SYNDROME OF RIGHT SHOULDER: ICD-10-CM

## 2024-07-10 PROCEDURE — 73221 MRI JOINT UPR EXTREM W/O DYE: CPT

## 2024-08-29 ENCOUNTER — OFFICE VISIT (OUTPATIENT)
Dept: FAMILY MEDICINE CLINIC | Facility: CLINIC | Age: 39
End: 2024-08-29
Payer: COMMERCIAL

## 2024-08-29 VITALS
RESPIRATION RATE: 16 BRPM | SYSTOLIC BLOOD PRESSURE: 110 MMHG | TEMPERATURE: 98 F | HEART RATE: 76 BPM | DIASTOLIC BLOOD PRESSURE: 70 MMHG | HEIGHT: 62 IN | WEIGHT: 146 LBS | BODY MASS INDEX: 26.87 KG/M2

## 2024-08-29 DIAGNOSIS — Z00.00 ROUTINE ADULT HEALTH MAINTENANCE: Primary | ICD-10-CM

## 2024-08-29 DIAGNOSIS — E61.1 IRON DEFICIENCY: ICD-10-CM

## 2024-08-29 DIAGNOSIS — Z13.29 SCREENING FOR THYROID DISORDER: ICD-10-CM

## 2024-08-29 DIAGNOSIS — Z12.31 SCREENING MAMMOGRAM FOR BREAST CANCER: ICD-10-CM

## 2024-08-29 DIAGNOSIS — Z12.31 ENCOUNTER FOR SCREENING MAMMOGRAM FOR MALIGNANT NEOPLASM OF BREAST: ICD-10-CM

## 2024-08-29 DIAGNOSIS — Q51.3 BICORNUATE UTERUS: ICD-10-CM

## 2024-08-29 DIAGNOSIS — Z13.6 SCREENING FOR CARDIOVASCULAR CONDITION: ICD-10-CM

## 2024-08-29 PROCEDURE — 99395 PREV VISIT EST AGE 18-39: CPT | Performed by: NURSE PRACTITIONER

## 2024-08-29 NOTE — PROGRESS NOTES
Adult Annual Physical  Name: Angela Martin      : 1985      MRN: [id number]  Encounter Provider: LIBRA Mendoza  Encounter Date: 2024   Encounter department: [facility]    Assessment & Plan   1. Routine adult health maintenance  2. Iron deficiency  -     Iron Panel (Includes Ferritin, Iron Sat%, Iron, and TIBC); Future  3. Screening for cardiovascular condition  -     CBC and differential; Future  -     Comprehensive metabolic panel; Future  -     Lipid panel; Future  4. Screening for thyroid disorder  -     TSH, 3rd generation with Free T4 reflex; Future  5. Encounter for screening mammogram for malignant neoplasm of breast  6. Screening mammogram for breast cancer  -     Mammo screening bilateral w 3d and cad; Future; Expected date: 2024  7. Bicornuate uterus  Assessment & Plan:  She is planning on following up with gynecology to discuss hysterectomy.  Immunizations and preventive care screenings were discussed with patient today. Appropriate education was printed on patient's after visit summary.    Counseling:  Dental Health: discussed importance of regular tooth brushing, flossing, and dental visits.  Exercise: the importance of regular exercise/physical activity was discussed. Recommend exercise 3-5 times per week for at least 30 minutes.          History of Present Illness     Adult Annual Physical:  Patient presents for annual physical. Here today for CPE.  Due for labs, will requested to check her iron levels.  She does not take her iron supplementation consistently.  She is perimenopausal with irregular menstrual cycles.  She did follow-up with GYN last year and was in discussion regarding hysterectomy..     Diet and Physical Activity:  - Diet/Nutrition:. Mostly carbs and meat  - Exercise: no formal exercise.    Depression Screening:  - PHQ-2 Score: 2    General Health:  - Sleep: sleeps poorly.  - Hearing: normal hearing bilateral ears.  - Vision: no vision  problems.  - Dental: no dental visits for > 1 year and brushes teeth twice daily.    /GYN Health:  - Follows with GYN: yes.   - Menopause: premenopausal.     Review of Systems   Constitutional: Negative.    Respiratory: Negative.     Cardiovascular: Negative.    Gastrointestinal: Negative.    Neurological: Negative.      Current Outpatient Medications on File Prior to Visit   Medication Sig Dispense Refill   • ferrous sulfate 324 (65 Fe) mg Take 1 tablet (324 mg total) by mouth 3 (three) times a week Monday, Wednesday, Friday 30 tablet 3   • [DISCONTINUED] ascorbic acid (VITAMIN C) 500 MG tablet Take 1 tablet (500 mg total) by mouth 3 (three) times a week Monday, Wednesday, Friday (Patient taking differently: Take 500 mg by mouth 3 times weekly) 30 tablet 3   • [DISCONTINUED] ergocalciferol (VITAMIN D2) 50,000 units TAKE 1 CAPSULE BY MOUTH ONE TIME PER WEEK FOR 8 DOSES (Patient not taking: Reported on 8/29/2024) 12 capsule 1   • [DISCONTINUED] hydrOXYzine pamoate (VISTARIL) 25 mg capsule TAKE 1 CAPSULE (25 MG TOTAL) BY MOUTH DAILY AT BEDTIME AS NEEDED FOR ANXIETY (Patient not taking: Reported on 5/8/2024) 90 capsule 1   • [DISCONTINUED] predniSONE 20 mg tablet Take 1 tablet (20 mg total) by mouth 2 (two) times a day with meals 8 tablet 0   • [DISCONTINUED] rizatriptan (MAXALT) 5 mg tablet TAKE 1 TABLET (5 MG TOTAL) BY MOUTH AS NEEDED FOR MIGRAINE TAKE AT THE ONSET OF MIGRAINE IF SYMPTOMS CONTINUE OR RETURN, MAY TAKE ANOTHER DOSE AT LEAST 2 HOURS AFTER FIRST DOSE. TAKE NO MORE THAN 2 DOSES IN A DAY. (Patient not taking: Reported on 1/4/2024) 4 tablet 2     No current facility-administered medications on file prior to visit.      Social History     Tobacco Use   • Smoking status: Never     Passive exposure: Never   • Smokeless tobacco: Never   Vaping Use   • Vaping status: Never Used   Substance and Sexual Activity   • Alcohol use: Not Currently     Alcohol/week: 1.0 standard drink of alcohol     Types: 1 Standard  "drinks or equivalent per week     Comment: Drink socially   • Drug use: Never   • Sexual activity: Yes     Partners: Male     Birth control/protection: Female Sterilization     Comment: 12 years        Objective     /70   Pulse 76   Temp 98 °F (36.7 °C)   Resp 16   Ht 5' 2\" (1.575 m)   Wt 66.2 kg (146 lb)   LMP 08/26/2024 (Exact Date)   BMI 26.70 kg/m²     Physical Exam  Vitals and nursing note reviewed.   Constitutional:       Appearance: Normal appearance. She is well-developed.   HENT:      Head: Normocephalic and atraumatic.      Right Ear: Tympanic membrane and external ear normal.      Left Ear: Tympanic membrane and external ear normal.      Nose: Nose normal.      Mouth/Throat:      Pharynx: Oropharynx is clear.   Eyes:      Extraocular Movements: Extraocular movements intact.      Conjunctiva/sclera: Conjunctivae normal.      Pupils: Pupils are equal, round, and reactive to light.   Neck:      Thyroid: No thyromegaly.      Vascular: No carotid bruit.   Cardiovascular:      Rate and Rhythm: Normal rate and regular rhythm.      Pulses: Normal pulses.      Heart sounds: Normal heart sounds. No murmur heard.  Pulmonary:      Effort: Pulmonary effort is normal.      Breath sounds: Normal breath sounds.   Abdominal:      General: Bowel sounds are normal. There is no distension.      Palpations: Abdomen is soft.      Tenderness: There is no abdominal tenderness.   Musculoskeletal:         General: No deformity. Normal range of motion.      Cervical back: Normal range of motion and neck supple.   Lymphadenopathy:      Cervical: No cervical adenopathy.   Skin:     General: Skin is warm and dry.      Capillary Refill: Capillary refill takes less than 2 seconds.   Neurological:      Mental Status: She is alert.      Sensory: No sensory deficit.      Motor: No abnormal muscle tone.      Coordination: Coordination normal.      Deep Tendon Reflexes: Reflexes normal.   Psychiatric:         Mood and " Affect: Mood normal.         Behavior: Behavior normal.

## 2024-09-03 ENCOUNTER — PATIENT MESSAGE (OUTPATIENT)
Dept: OBGYN CLINIC | Facility: CLINIC | Age: 39
End: 2024-09-03

## 2024-09-04 ENCOUNTER — APPOINTMENT (OUTPATIENT)
Dept: LAB | Facility: HOSPITAL | Age: 39
End: 2024-09-04
Payer: COMMERCIAL

## 2024-09-04 DIAGNOSIS — Z13.6 SCREENING FOR CARDIOVASCULAR CONDITION: ICD-10-CM

## 2024-09-04 DIAGNOSIS — E61.1 IRON DEFICIENCY: ICD-10-CM

## 2024-09-04 DIAGNOSIS — Z13.29 SCREENING FOR THYROID DISORDER: ICD-10-CM

## 2024-09-04 LAB
ALBUMIN SERPL BCG-MCNC: 4.1 G/DL (ref 3.5–5)
ALP SERPL-CCNC: 55 U/L (ref 34–104)
ALT SERPL W P-5'-P-CCNC: 14 U/L (ref 7–52)
ANION GAP SERPL CALCULATED.3IONS-SCNC: 6 MMOL/L (ref 4–13)
AST SERPL W P-5'-P-CCNC: 15 U/L (ref 13–39)
BASOPHILS # BLD AUTO: 0.07 THOUSANDS/ÂΜL (ref 0–0.1)
BASOPHILS NFR BLD AUTO: 1 % (ref 0–1)
BILIRUB SERPL-MCNC: 0.51 MG/DL (ref 0.2–1)
BUN SERPL-MCNC: 11 MG/DL (ref 5–25)
CALCIUM SERPL-MCNC: 8.7 MG/DL (ref 8.4–10.2)
CHLORIDE SERPL-SCNC: 104 MMOL/L (ref 96–108)
CHOLEST SERPL-MCNC: 195 MG/DL
CO2 SERPL-SCNC: 29 MMOL/L (ref 21–32)
CREAT SERPL-MCNC: 0.71 MG/DL (ref 0.6–1.3)
EOSINOPHIL # BLD AUTO: 0.18 THOUSAND/ÂΜL (ref 0–0.61)
EOSINOPHIL NFR BLD AUTO: 4 % (ref 0–6)
ERYTHROCYTE [DISTWIDTH] IN BLOOD BY AUTOMATED COUNT: 13.3 % (ref 11.6–15.1)
FERRITIN SERPL-MCNC: 27 NG/ML (ref 11–307)
GFR SERPL CREATININE-BSD FRML MDRD: 107 ML/MIN/1.73SQ M
GLUCOSE P FAST SERPL-MCNC: 89 MG/DL (ref 65–99)
HCT VFR BLD AUTO: 38.7 % (ref 34.8–46.1)
HDLC SERPL-MCNC: 55 MG/DL
HGB BLD-MCNC: 12.3 G/DL (ref 11.5–15.4)
IMM GRANULOCYTES # BLD AUTO: 0.01 THOUSAND/UL (ref 0–0.2)
IMM GRANULOCYTES NFR BLD AUTO: 0 % (ref 0–2)
IRON SATN MFR SERPL: 28 % (ref 15–50)
IRON SERPL-MCNC: 86 UG/DL (ref 50–212)
LDLC SERPL CALC-MCNC: 107 MG/DL (ref 0–100)
LYMPHOCYTES # BLD AUTO: 1.76 THOUSANDS/ÂΜL (ref 0.6–4.47)
LYMPHOCYTES NFR BLD AUTO: 35 % (ref 14–44)
MCH RBC QN AUTO: 26 PG (ref 26.8–34.3)
MCHC RBC AUTO-ENTMCNC: 31.8 G/DL (ref 31.4–37.4)
MCV RBC AUTO: 82 FL (ref 82–98)
MONOCYTES # BLD AUTO: 0.38 THOUSAND/ÂΜL (ref 0.17–1.22)
MONOCYTES NFR BLD AUTO: 8 % (ref 4–12)
NEUTROPHILS # BLD AUTO: 2.62 THOUSANDS/ÂΜL (ref 1.85–7.62)
NEUTS SEG NFR BLD AUTO: 52 % (ref 43–75)
NONHDLC SERPL-MCNC: 140 MG/DL
NRBC BLD AUTO-RTO: 0 /100 WBCS
PLATELET # BLD AUTO: 241 THOUSANDS/UL (ref 149–390)
PMV BLD AUTO: 11 FL (ref 8.9–12.7)
POTASSIUM SERPL-SCNC: 3.8 MMOL/L (ref 3.5–5.3)
PROT SERPL-MCNC: 6.4 G/DL (ref 6.4–8.4)
RBC # BLD AUTO: 4.73 MILLION/UL (ref 3.81–5.12)
SODIUM SERPL-SCNC: 139 MMOL/L (ref 135–147)
TIBC SERPL-MCNC: 304 UG/DL (ref 250–450)
TRIGL SERPL-MCNC: 163 MG/DL
TSH SERPL DL<=0.05 MIU/L-ACNC: 2.28 UIU/ML (ref 0.45–4.5)
UIBC SERPL-MCNC: 218 UG/DL (ref 155–355)
WBC # BLD AUTO: 5.02 THOUSAND/UL (ref 4.31–10.16)

## 2024-09-04 PROCEDURE — 83540 ASSAY OF IRON: CPT

## 2024-09-04 PROCEDURE — 80053 COMPREHEN METABOLIC PANEL: CPT

## 2024-09-04 PROCEDURE — 84443 ASSAY THYROID STIM HORMONE: CPT

## 2024-09-04 PROCEDURE — 80061 LIPID PANEL: CPT

## 2024-09-04 PROCEDURE — 85025 COMPLETE CBC W/AUTO DIFF WBC: CPT

## 2024-09-04 PROCEDURE — 82728 ASSAY OF FERRITIN: CPT

## 2024-09-04 PROCEDURE — 36415 COLL VENOUS BLD VENIPUNCTURE: CPT

## 2024-09-04 PROCEDURE — 83550 IRON BINDING TEST: CPT

## 2024-09-04 NOTE — TELEPHONE ENCOUNTER
Attempted to contact pt via telephone, lmom for pt to call office.      Infusion Nursing Note:  Angella Ley presents today for Simponi.    Patient seen by provider today: No   present during visit today: Not Applicable.    Note:patient currently undergoing radiation therapy, Dr Vazquez notified of above  MAGNOLIA rivero to proceed with Simponi aria while receiving radiation therapy.    Intravenous Access:  Peripheral IV placed.Labs drawn without difficulty by Redd OLIVER.    Treatment Conditions:  Results reviewed, labs MET treatment parameters, ok to proceed with treatment.    Component      Latest Ref Rng 9/4/2024  11:52 AM   Creatinine      0.51 - 0.95 mg/dL 0.75    GFR Estimate      >60 mL/min/1.73m2 83    Platelet Count      150 - 450 10e3/uL 300    ALT      0 - 50 U/L 12    Sed Rate      0 - 30 mm/hr 9    CRP Inflammation      <5.00 mg/L 6.61 (H)    WBC      4.0 - 11.0 10e3/uL 11.0    Hemoglobin      11.7 - 15.7 g/dL 16.0 (H)       Legend:  (H) High    1300 Received message from pharmacy, notifying that we are waiting for authorization from insurance to proceed with simponi aria infusion     Above information relayed to patient and her family, verbalize understanding.    1400 received call from Rashmi mccord of patient inquiring if we have received authorization to proceed. Notified suri that per pharmacy manager Fred, that we are unable to proceed today. Suri requesting nurse submit a complaint in regards to patient drug not being authorized and patient wait time.   Notified Rashmi that we will have our  call to reschedule appt for next week and that I will reach out to [facility] rheumatology to see if we can use today's lab for next weeks appointment.    Patient and family notified of above, in agreement with plan.    Site patent and intact, free from redness, edema or discomfort.  No evidence of extravasations.     Discharge Plan:   Patient discharged in stable condition accompanied by: family.  Departure Mode: Ambulatory.

## 2024-09-10 ENCOUNTER — EVALUATION (OUTPATIENT)
Dept: PHYSICAL THERAPY | Facility: CLINIC | Age: 39
End: 2024-09-10
Payer: COMMERCIAL

## 2024-09-10 DIAGNOSIS — M75.41 IMPINGEMENT SYNDROME OF RIGHT SHOULDER: Primary | ICD-10-CM

## 2024-09-10 PROCEDURE — 97162 PT EVAL MOD COMPLEX 30 MIN: CPT

## 2024-09-10 PROCEDURE — 97530 THERAPEUTIC ACTIVITIES: CPT

## 2024-09-10 NOTE — PROGRESS NOTES
PT Evaluation   Today's date: 2024  Patient name: Angela Martin  : 1985  MRN: [id number]  Referring provider: Catrachito Hernandez PA-C  Dx:   Encounter Diagnosis     ICD-10-CM    1. Impingement syndrome of right shoulder  M75.41 Ambulatory Referral to Physical Therapy                Assessment  Assessment details: Patient is a 39 y.o. female who presents with referring diagnosis of impingement syndrome of right shoulder. Patient's greatest concern is concern regarding no signs of improvement, fear of not being able to stay active.    Primary movement impairment diagnosis of shoulder pain with mobility deficits resulting in pathoanatomical symptoms of restricted range of motion, muscular power deficits, force couple imbalance, motor control deficits, and functional limitations. The aforementioned impairments have limited the patient's ability to raise arm, lift, reach behind head/back without pain. No further referral appears necessary at this time based upon examination results.     Patient education performed during today's session included: HEP consisting of scap retractions, pendulums, and cross body adduction    Primary movement impairments:  1) Mobility deficits  2) Force couple imbalance    Etiological factors include: repetitive use        Impairments: Abnormal coordination, Abnormal muscle tone, Abnormal or restricted ROM, Activity intolerance, Impaired balance, Impaired physical strength, Lacks appropriate HEP, Poor posture, Poor body mechanics, Pain with function, Scapular dyskinesis, Abnormal movement, and Abnormal muscle firing  Understanding of Dx/Px/POC: Good  Prognosis: Good   Positive prognostic factors: age, active lifestyle, motivation for improvement   Negative prognostic factors: chronicity of pain, irritability of symptoms    Patient verbalized understanding of POC.         Please contact me if you have any questions  or recommendations. Thank you for the referral and the opportunity to share in Angela Martin's care.        Plan  Patient would benefit from: PT Eval and Skilled PT  Planned modality interventions: Biofeedback, Cryotherapy, TENS, Thermotherapy: Hydrocollator Packs, and Traction  Planned therapy interventions: Abdominal trunk stabilization, ADL training, Body mechanics training, Coordination, Dry Needling, Functional ROM exercises, HEP, Joint mobilization, Manual therapy, Stanford taping, Motor coordination training, Neuromuscular re-education, Patient education, Postural training, Strengthening, Stretching, Therapeutic activities, Therapeutic exercises, and Activity modification  Frequency: 2x/week  Duration in weeks: 8  Plan of Care beginning date: 9/10/2024  Plan of Care expiration date: 8 weeks - 11/5/2024  Treatment plan discussed with: Patient       Goals  Short Term Goals (4 weeks):    - Patient will be independent in basic HEP 2-3 weeks  - Patient will report >50% reduction in pain  - Patient will demonstrate >1/3 improvement in MMT grade as applicable  - Patient will demo >135 degrees of shoulder flexion to promote functional mobility    Long Term Goals (8 weeks):  - Patient will be independent in a comprehensive home exercise program  - Patient FOTO score will improve >10 points  - Patient will self-report >75% improvement in function  - Patient will deny pain with overhead reaching  - Patient will deny pain with reaching across her body  - Patient will return to [facility]      Subjective    History of Present Illness  - Mechanism of injury: Patient reports to physical therapy with recurrent bout of right shoulder pain that has been worsening over the last year. Notes that this has been worsening as a result of participating in [facility]. Current aggravating factors include reaching overhead, reaching across her body, reaching forward; particularly worse with fast and sudden motions. Notes that she  occasionally gets numbness and tingling from her elbow into her 4th and 5th digits. Reports pain with laying on left side.    MRI from 7/10/24 revealed: Mild supraspinatus and infraspinatus insertional tendinosis with trace subacromial-subdeltoid bursitis but no evidence of rotator cuff tear.  Intact long head biceps tendon and intact glenoid labrum. Pain usually localized to anterior and posterior aspect of shoulder.    Patient is right hand dominant.    Patient does have a history of migraines, almost daily headaches, usually located about right front of her head. Denies changes in her vision and/or hearing.    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        Pain  - Current pain ratin/10  - At best pain ratin/10  - At worst pain ratin/10      Objective     Postural Assessment  -Posture in Sitting: forward head/shoulders    Sensation  - Light touch: grossly intact and equal bilaterally  - Upper Motor Neuron Signs: negative jimenez bilaterally    Active Range of Motion  Cervical Spine  Flexion:  Minimally limited  without pain  Extension:  Minimally limited  without pain  Lateral Flexion - Left:  Moderately limited  with pain  Lateral Flexion - Right:  Moderately limited  with pain  Rotation - Left:  Minimally limited  without pain  Rotation - Right:  Minimally limited  without pain    Thoracic Spine  Extension:  Moderately limited  without pain  Rotation - Left:  Moderately limited  without pain  Rotation - Right:  Minimally limited  with pain    Shoulder   LEFT  RIGHT  - Flexion: 175  90 *p  - Abduction: 175  80 *p  - ER:  T3  to ear  - IR:  T12  to sacrum    Passive Shoulder Range of Motion in Supine    LEFT  RIGHT  - Flexion: 175  100 *p  - Abduction: 175  90 *p  - ER at 90: 90  30 *p  - IR at 90: 60  45 *p    Mechanical Assessment  Comparable Sign: pain with shoulder flexion  - Retraction: No Effect  - Retraction + Extension: No Effect  - Repeated Shoulder Extension: Increased  - Cross Body  Adduction: Decreased    Scapular Mobility  Static Positioning: winging at inferior angle   Below 90 degrees elevation: Upward Rotation:  Delayed  Above 90 degrees elevation: Upward Rotation:  Inadequate    UE MMT  LEFT  RIGHT  - Shoulder Flexion:  4+/5  3+/5  - Shoulder Abduction:  4+/5  4-/5  - Shoulder ER:  4+/5  3+/5  - Shoulder IR:   4+/5  4+/5  - Elbow Flexion:  4+/5  4/5  - Elbow Extension:  4+/5  4+/5    - Middle Trap:   4-/5  3+/5    Joint Play  - Posterior Capsule: Hypomobile  - Inferior Capsule: Hypomobile  - Acromioclavicular Joint: Hypomobile  - 1st Rib: Hypomobile  - CTJ: Normal  - Mid-Thoracic Spine: Normal    Diagnostic Tests Performed  Held secondary to irritability of symptoms               Insurance:  A/CMS Eval/ Re-eval POC expires FOTO Auth #/ Referral # Total visits  Start date  Expiration date Extension  Visit limitation?  PT only or  PT+OT? Co-Insurance   [employer]: AMA 9/10/24 11/5/24       50 PCY PT $30 co-pay                                                                  Date 9-10               Used 1               Remaining  49                   Date                Used                Remaining                    Date 9/10/2024        Visit Number IE        Manual         R shld LAD Performed                                            TherEx         R shld PROM         Pendulums Rev for HEP        Cross body add Rev for HEP        Pulleys         Rows         Shld ext         Serratus punches         SL ER                                    Scap resetting  ++       Neuro Re-Ed         Scap retraction Rev for HEP        Wall slides + lift off                                                      TherAct         Patient education Pt edu, PT POC, HEP 10 min                                            Gait Training                                    Modalities         CP           Precautions:   Past Medical History:   Diagnosis Date    ADHD (attention deficit hyperactivity disorder)     KVNG  (generalized anxiety disorder)     Migraines

## 2024-09-18 ENCOUNTER — APPOINTMENT (OUTPATIENT)
Dept: PHYSICAL THERAPY | Facility: CLINIC | Age: 39
End: 2024-09-18
Payer: COMMERCIAL

## 2024-09-20 ENCOUNTER — OFFICE VISIT (OUTPATIENT)
Dept: OBGYN CLINIC | Facility: CLINIC | Age: 39
End: 2024-09-20
Payer: COMMERCIAL

## 2024-09-20 VITALS
HEIGHT: 62 IN | BODY MASS INDEX: 26.31 KG/M2 | DIASTOLIC BLOOD PRESSURE: 64 MMHG | WEIGHT: 143 LBS | SYSTOLIC BLOOD PRESSURE: 102 MMHG

## 2024-09-20 DIAGNOSIS — N39.3 SUI (STRESS URINARY INCONTINENCE, FEMALE): Primary | ICD-10-CM

## 2024-09-20 PROCEDURE — 99214 OFFICE O/P EST MOD 30 MIN: CPT | Performed by: OBSTETRICS & GYNECOLOGY

## 2024-09-20 NOTE — PROGRESS NOTES
Subjective     Angela Martin is a 39 y.o. G5, P2 female here for a problem visit.  Patient continues with significant stress urine continence mostly with laugh cough sneeze strain exercise.  Patient also with menorrhagia not significantly improved with ibuprofen.  Patient is aware of alternatives to treat has bicornuate uterus.  Reviewed again options such as Lysteda, OCP (patient does have some migraines PMS and hormonal symptoms discussed and reviewed).  Patient would prefer to have definitive therapy with hysterectomy and before end of year they have met deductible due to her daughter needing cholecystectomy.  Patient fully counseled to [facility] patient had tubal ligation previously we discussed remaining to bilateral salpingectomy, exam under anesthesia, cystoscopy we discussed surgical instructions, risks and benefits expected outcomes.  We discussed referral to urogynecology and that they may do her complete surgery for her patient lives in [place] we discussed depending on her insurance she may need to see somebody further in [place] and that we can refer in that direction if needed she is okay going to [place] to see our network urogynecology.     Gynecologic History  Patient's last menstrual period was 2024 (exact date).  Contraception: tubal ligation  Last Pap: 2023. Results were: normal  Last mammogram: Scheduled    Obstetric History  OB History    Para Term  AB Living   5 2 2   3 2   SAB IAB Ectopic Multiple Live Births   3       2      # Outcome Date GA Lbr Socrates/2nd Weight Sex Type Anes PTL Lv   5 Term     M Vag-Spont   ROD   4 Term     F CS-Unspec   ROD   3 SAB            2 SAB            1 SAB               Obstetric Comments   First trimester miscarriages      Past Medical History:   Diagnosis Date    ADHD (attention deficit hyperactivity disorder)     Anemia     KVNG (generalized anxiety disorder)     History of transfusion 10/03/2008    Hemorrhage during child birth  "   Migraines     Miscarriage     2007, , ,      Past Surgical History:   Procedure Laterality Date     SECTION  12    KIDNEY SURGERY  2003    Hydronephorsis while pregnant (pardon the spelling)    TUBAL LIGATION Bilateral      Current Outpatient Medications   Medication Instructions    ferrous sulfate 324 mg, Oral, 3 times weekly, Monday, Wednesday, Friday     Allergies   Allergen Reactions    Tetanus-Diphtheria Toxoids Td Swelling     Social History     Tobacco Use    Smoking status: Never     Passive exposure: Never    Smokeless tobacco: Never   Vaping Use    Vaping status: Never Used   Substance Use Topics    Alcohol use: Not Currently     Alcohol/week: 1.0 standard drink of alcohol     Types: 1 Standard drinks or equivalent per week     Comment: Drink socially    Drug use: Never         Review of Systems  Review of Systems   Constitutional:  Negative for fatigue, fever and unexpected weight change.   HENT:  Negative for dental problem, sinus pressure and sinus pain.    Eyes:  Negative for visual disturbance.   Respiratory:  Negative for cough, shortness of breath and wheezing.    Cardiovascular:  Negative for chest pain and leg swelling.   Gastrointestinal:  Negative for blood in stool, constipation, diarrhea, nausea and vomiting.   Endocrine: Negative for cold intolerance, heat intolerance and polydipsia.   Genitourinary:  Positive for menstrual problem. Negative for dysuria, frequency, hematuria and pelvic pain.        Heavy and painful periods   Musculoskeletal:  Negative for arthralgias and back pain.   Neurological:  Negative for dizziness, seizures and headaches.   Psychiatric/Behavioral:  The patient is not nervous/anxious.         Objective     /64 (BP Location: Left arm, Patient Position: Sitting)   Ht 5' 2\" (1.575 m)   Wt 64.9 kg (143 lb)   LMP 2024 (Exact Date)   BMI 26.16 kg/m²   General appearance: alert and oriented, in no acute distress  Head: " Normocephalic, without obvious abnormality, atraumatic  Lungs: clear to auscultation bilaterally  Heart: regular rate and rhythm, S1, S2 normal, no murmur, click, rub or gallop  Abdomen: soft, non-tender; bowel sounds normal; no masses,  no organomegaly and very low Pfannenstiel incision small  Extremities: extremities normal, warm and well-perfused; no cyanosis, clubbing, or edema      Assessment  39-year-old G5, P2 with significant stress urine continence desiring evaluation and treatment also with menorrhagia desiring definitive therapy.  Consent signed also given wash and carbohydrate drinks as well as instructions     Plan  Referral to urogynecology placed encourage patient to contact us if having difficulty getting scheduled.  Discussed with patient that urogynecology may offer if she requires a procedure to do either combined procedure with us or that they will do entire procedure for her in order to accommodate her schedule.

## 2024-09-24 ENCOUNTER — OFFICE VISIT (OUTPATIENT)
Dept: PHYSICAL THERAPY | Facility: CLINIC | Age: 39
End: 2024-09-24
Payer: COMMERCIAL

## 2024-09-24 DIAGNOSIS — M75.41 IMPINGEMENT SYNDROME OF RIGHT SHOULDER: Primary | ICD-10-CM

## 2024-09-24 PROCEDURE — 97140 MANUAL THERAPY 1/> REGIONS: CPT

## 2024-09-24 PROCEDURE — 97110 THERAPEUTIC EXERCISES: CPT

## 2024-09-24 NOTE — PROGRESS NOTES
Daily Note     Today's date: 2024  Patient name: Angela Martin  : 1985  MRN: [id number]  Referring provider: Catrachito Hernandez PA-C  Dx:   Encounter Diagnosis     ICD-10-CM    1. Impingement syndrome of right shoulder  M75.41           Start Time: 1630  Stop Time: 1705  Total time in clinic (min): 35 minutes    Subjective: Patient denies changes in right shoulder pain since previous session.       Objective: See treatment diary below    Mechanical Exam:  Initial motion: shld flexion to 105 deg, abduction to 90 deg  - Manual cervical distraction + retraction + extension: decreased pain with flexion, achieves 135 deg flex, no change in abduction  - Seated t/s ext: same      Assessment: Tolerated treatment fair. Patient demo moderate to high levels of irritability throughout today's session. Patient with moderate reduction in shoulder pain and improvement in ROM with flexion following manual cervical retraction + distraction + extension and maintenance of improvement w/ repeated thoracic extension, although no change in pain/motion noted w/ abduction. HEP updated to include thoracic extension, pt verbalized good understanding/agreement. Initiated prone work to address scapular mechanics w/ particular emphasis on scap depression, provided pt w/ intermittent verbal and tactile cues to limit compensatory UT activation. Patient will continue to benefit from skilled PT to improve functional mobility and symptom irritability.      Plan: Continue per plan of care.      Insurance:  AMA/CMS Eval/ Re-eval POC expires FOTO Auth #/ Referral # Total visits  Start date  Expiration date Extension  Visit limitation?  PT only or  PT+OT? Co-Insurance   [facility]: AMA 9/10/24 11/5/24       50 PCY PT $30 co-pay                                                                  Date 9-10 9-24              Used 1 1              Remaining  49 48                  Date                Used                Remaining                    Date  9/10/2024 9/24/24       Visit Number IE 2       Manual         R shld LAD Performed performed         GHJ inf mobs gr II-III 5x30         ACJ AP mobs gr II-III 5x30         R C5-7 upglides gr II-IV 8x30                TherEx         R shld PROM  Trial, flexion and abd       Pendulums Rev for HEP        Cross body add Rev for HEP        Pulleys         Rows         Shld ext         Serratus punches         SL ER         Prone work  Scap retraction x10    Row 3x10    T (small range) 3x5                Mechanical exam  Manual supine distraction + retraction + extension 2x10    Seated t/s ext 2x10       Scap resetting  Trial PROM x20       Neuro Re-Ed         Scap retraction Rev for HEP        Wall slides + lift off                                                      TherAct         Patient education Pt edu, PT POC, HEP 10 min                                            Gait Training                                    Modalities         CP           Precautions:   Past Medical History:   Diagnosis Date    ADHD (attention deficit hyperactivity disorder)     KVNG (generalized anxiety disorder)     Migraines

## 2024-09-25 ENCOUNTER — TELEPHONE (OUTPATIENT)
Age: 39
End: 2024-09-25

## 2024-09-26 ENCOUNTER — TELEPHONE (OUTPATIENT)
Dept: OBGYN CLINIC | Facility: CLINIC | Age: 39
End: 2024-09-26

## 2024-09-26 NOTE — TELEPHONE ENCOUNTER
----- Message from Mckenna Barcenas MD sent at 2024  4:28 PM EDT -----  Regarding: LAVH B salpingectomy seeing urogyn first  [facility] OB GYN Department  Surgery Scheduling Sheet    Patient Name: Angela Martin  : 1985    Provider: Mckenna Barcenas MD  PATIENT SEEING UROGYN FIRST, THEY MAY DO HER SURGERY DO NOT SCHEDULE UNTIL SHE SEES THEM AND DECIDES     Needed: no; Language: N/A    Procedure: exam under anesthesia, laparoscopic assisted vaginal hysterectomy, cystoscopy, and bilateral salpingectomy (history of tubal but may have some tubal portions left)    Diagnosis: Menorrhagia with anemia    Special Needs or Equipment: none    Anesthesia: General anesthesia    Length of stay: outpatient  Does patient have comorbid conditions that will require close perioperative monitoring prior to safe discharge: no    The patient has comorbid conditions that will require close perioperative monitoring prior to safe discharge, including N/A.   This may require acute care beyond the usual and routine recovery period. As such, inpatient admission post-operatively is expected and appropriate, and anticipated hospital length of stay will be >2 midnights.    Pre-Admission Testing Needed: yes   Labs that should be ordered: cbc, hgb A1C, type and screen, and cmp    Order PAT that is recommended in prep for procedure?:  Please do    Medical Clearance Needed: no; Provider:     MA Form Signed (tubals/hysterectomy): Not Indicated    Surgical Drink Given: yes     How many days out of work: 4 week(s)     How many days no drivin day(s)       Is pre op appt needed?  no  Interval for post op appt: 1, 3, and 6 weeks    For Surgical Scheduler:     Surgery Scheduled On:  [place]: Prefer [facility] open to options if combined case or if gets surgery with urogyn    Pre-op Appt:   Post op Appt:  Consult/Medical clearance appt:

## 2024-09-26 NOTE — TELEPHONE ENCOUNTER
I spoke with pt today and the urogyn you referred her to doesn't take her insurance. I gave her the number for [facility] to call. Can you please put a referral into Epic for her.

## 2024-10-01 ENCOUNTER — OFFICE VISIT (OUTPATIENT)
Dept: PHYSICAL THERAPY | Facility: CLINIC | Age: 39
End: 2024-10-01
Payer: COMMERCIAL

## 2024-10-01 ENCOUNTER — TELEPHONE (OUTPATIENT)
Dept: OBGYN CLINIC | Facility: CLINIC | Age: 39
End: 2024-10-01

## 2024-10-01 DIAGNOSIS — M75.41 IMPINGEMENT SYNDROME OF RIGHT SHOULDER: Primary | ICD-10-CM

## 2024-10-01 PROCEDURE — 97530 THERAPEUTIC ACTIVITIES: CPT

## 2024-10-01 PROCEDURE — 97140 MANUAL THERAPY 1/> REGIONS: CPT

## 2024-10-01 NOTE — PROGRESS NOTES
"Daily Note     Today's date: 10/1/2024  Patient name: Angela Martin  : 1985  MRN: [id number]  Referring provider: Catrachito Hernandez PA-C  Dx:   Encounter Diagnosis     ICD-10-CM    1. Impingement syndrome of right shoulder  M75.41           Start Time: 1630  Stop Time: 1710  Total time in clinic (min): 40 minutes    Subjective: \"Things are the same, sleeping is getting worse.\"      Objective: See treatment diary below          Assessment: Tolerated treatment fair.   Initiated dry needling secondary to continued irritability of symptoms and muscular hypertonicity about right shoulder. Dry needling consent for reviewed and signed by patient. Pt educated on dry needling to include but not limited to: risks/benefits/aftercare instructions. Provided with educational handout on DN. All questions and concerns answered and addressed.    Pt verbally consented to dry needling treatment session. Risks/benefits/aftercare instructions reviewed. All questions/concerns addressed.  Pt in left sidelying position. Hand hygiene performed pre and post DN treatment session. Placement of needles in pathological tissue.   Cervicothoracic junction, right biceps tendon, right deltoid, UT insertion on right(needle location).  Total treatment duration to include set up/break down/in situ: 40 minutes. Patient was supervised by clinician throughout entirety of treatment session to include when DN in situ; clinician next to patient. All needles counted upon insertion and removal-- 15 needles. All accounted for and disposed in appropriate sharp container.     No adverse reaction to treatment. Pt advised may experience soreness, fatigue, bruising. Pt verbalized understanding.     High irritability noted about shoulder mm, particularly biceps tendon and delt w/ DN.        Plan: Continue per plan of care.  Assess response to DN. Scap and mobility work as tolerated.     Insurance:  AMA/CMS Eval/ Re-eval POC expires FOTO Auth #/ Referral # " Total visits  Start date  Expiration date Extension  Visit limitation?  PT only or  PT+OT? Co-Insurance   [facility]: AMA 9/10/24 11/5/24       50 PCY PT $30 co-pay                                                                  Date 9-10 9-24 10-1             Used 1 1 1             Remaining  49 [phone] 47                 Date                Used                Remaining                    Date 9/10/2024 9/24/24 10/1/24      Visit Number IE 2 3      Manual         R shld LAD Performed performed         GHJ inf mobs gr II-III 5x30         ACJ AP mobs gr II-III 5x30         R C5-7 upglides gr II-IV 8x30          DN insertion x10 min      TherEx         R shld PROM  Trial, flexion and abd       Pendulums Rev for HEP        Cross body add Rev for HEP        Pulleys         Rows         Shld ext         Serratus punches         SL ER         Prone work  Scap retraction x10    Row 3x10    T (small range) 3x5                Mechanical exam  Manual supine distraction + retraction + extension 2x10    Seated t/s ext 2x10       Scap resetting  Trial PROM x20       Neuro Re-Ed         Scap retraction Rev for HEP        Wall slides + lift off                                                      TherAct         Patient education Pt edu, PT POC, HEP 10 min  Pt edu, PT POC, DN edu x25 min                                          Gait Training                                    Modalities         CP           Precautions:   Past Medical History:   Diagnosis Date    ADHD (attention deficit hyperactivity disorder)     KVNG (generalized anxiety disorder)     Migraines                see HPI

## 2024-10-02 ENCOUNTER — TELEPHONE (OUTPATIENT)
Dept: OBGYN CLINIC | Facility: CLINIC | Age: 39
End: 2024-10-02

## 2024-10-03 ENCOUNTER — HOSPITAL ENCOUNTER (OUTPATIENT)
Facility: HOSPITAL | Age: 39
End: 2024-10-03
Payer: COMMERCIAL

## 2024-10-03 VITALS — HEIGHT: 62 IN | WEIGHT: 143 LBS | BODY MASS INDEX: 26.31 KG/M2

## 2024-10-03 DIAGNOSIS — Z12.31 SCREENING MAMMOGRAM FOR BREAST CANCER: ICD-10-CM

## 2024-10-03 PROCEDURE — 77067 SCR MAMMO BI INCL CAD: CPT

## 2024-10-03 PROCEDURE — 77063 BREAST TOMOSYNTHESIS BI: CPT

## 2024-10-08 ENCOUNTER — OFFICE VISIT (OUTPATIENT)
Dept: PHYSICAL THERAPY | Facility: CLINIC | Age: 39
End: 2024-10-08
Payer: COMMERCIAL

## 2024-10-08 DIAGNOSIS — M75.41 IMPINGEMENT SYNDROME OF RIGHT SHOULDER: Primary | ICD-10-CM

## 2024-10-08 PROCEDURE — 97140 MANUAL THERAPY 1/> REGIONS: CPT | Performed by: PHYSICAL THERAPIST

## 2024-10-08 PROCEDURE — 97110 THERAPEUTIC EXERCISES: CPT | Performed by: PHYSICAL THERAPIST

## 2024-10-08 NOTE — PROGRESS NOTES
Daily Note     Today's date: 10/8/2024  Patient name: Angela Martin  : 1985  MRN: [id number]  Referring provider: Catrachito Hernandez PA-C  Dx:   Encounter Diagnosis     ICD-10-CM    1. Impingement syndrome of right shoulder  M75.41                Subjective: Patient reports that her shoulder continues to feel terrible.     Objective: See treatment diary below    Assessment: Tolerated treatment fair. Patient demonstrates about 120 degrees shoulder flexion improved slightly with repeated shoulder extension. Continues to have significant limitation with shoulder behind back IR to nearly sacrum and abduction shy of 90 degrees. Gave repeated shoulder extension to perform at home 4-5 times per day and report back.     Plan: Continue per plan of care.  Assess response to DN. Scap and mobility work as tolerated.     Insurance:  [employer]/CMS Eval/ Re-eval POC expires FOTO Auth #/ Referral # Total visits  Start date  Expiration date Extension  Visit limitation?  PT only or  PT+OT? Co-Insurance   [employer]: AMA 9/10/24 11/5/24       50 PCY PT $30 co-pay                    Date 9-10 9-24 10-1 10-8            Used 1 1 1 1            Remaining  [phone]                Date                Used                Remaining                  Patient provided verbal consent to treatment plan, grade 5 joint mobilization, and recommended interventions.    Date 9/10/2024 9/24/24 10/1/24 10/9/24     Visit Number IE 2 3      Manual         R shld LAD Performed performed  performed       GHJ inf mobs gr II-III 5x30  3rd rib gr. 5       ACJ AP mobs gr II-III 5x30  AC gr. 5       R C5-7 upglides gr II-IV 8x30          DN insertion x10 min      TherEx         R shld PROM  Trial, flexion and abd       Pendulums Rev for HEP        Cross body add Rev for HEP   2*10     Repeated shld extn    4*10     Pulleys         Rows         Shld ext         Serratus punches         SL ER         Prone work  Scap retraction x10    Row 3x10    T (small range) 3x5        assessment    FB     Mechanical exam  Manual supine distraction + retraction + extension 2x10  Seated t/s ext 2x10       Scap resetting  Trial PROM x20       Neuro Re-Ed         Scap retraction Rev for HEP        Wall slides + lift off                           TherAct         Patient education Pt edu, PT POC, HEP 10 min  Pt edu, PT POC, DN edu x25 min                                          Modalities         CP           Precautions:   Past Medical History:   Diagnosis Date    ADHD (attention deficit hyperactivity disorder)     KVNG (generalized anxiety disorder)     Migraines

## 2024-10-14 ENCOUNTER — TELEPHONE (OUTPATIENT)
Age: 39
End: 2024-10-14

## 2024-10-14 NOTE — TELEPHONE ENCOUNTER
----- Message from Mckenna Barcenas MD sent at 10/14/2024 11:16 AM EDT -----  Regarding: upcoming surgery  I just need to confirm that patient wants to proceed with surgery prior to seeing urogynecology?  She has an appointment 12/27 which may be a little soon after her hysterectomy.  Please document what patient said/says regarding this situation.  She may end up getting separate surgeries when she could just get one.

## 2024-10-14 NOTE — TELEPHONE ENCOUNTER
I spoke with pt and she said she wants to have the surgery with you because her deductible is already met and she wants to have something done. She stated that urogyn knows that she is having surgery with you and her appt on 12/27/24 is just a consultation.

## 2024-10-15 ENCOUNTER — OFFICE VISIT (OUTPATIENT)
Dept: OBGYN CLINIC | Facility: CLINIC | Age: 39
End: 2024-10-15
Payer: COMMERCIAL

## 2024-10-15 VITALS
WEIGHT: 143 LBS | BODY MASS INDEX: 26.31 KG/M2 | DIASTOLIC BLOOD PRESSURE: 72 MMHG | HEART RATE: 87 BPM | SYSTOLIC BLOOD PRESSURE: 110 MMHG | HEIGHT: 62 IN

## 2024-10-15 DIAGNOSIS — M75.01 ADHESIVE CAPSULITIS OF RIGHT SHOULDER: Primary | ICD-10-CM

## 2024-10-15 PROCEDURE — 99213 OFFICE O/P EST LOW 20 MIN: CPT | Performed by: ORTHOPAEDIC SURGERY

## 2024-10-15 PROCEDURE — 20610 DRAIN/INJ JOINT/BURSA W/O US: CPT | Performed by: ORTHOPAEDIC SURGERY

## 2024-10-15 RX ADMIN — TRIAMCINOLONE ACETONIDE 20 MG: 40 INJECTION, SUSPENSION INTRA-ARTICULAR; INTRAMUSCULAR at 14:45

## 2024-10-15 RX ADMIN — ROPIVACAINE HYDROCHLORIDE 4 ML: 2 INJECTION, SOLUTION EPIDURAL; INFILTRATION; PERINEURAL at 14:45

## 2024-10-15 NOTE — PROGRESS NOTES
Assessment/Plan:  1. Adhesive capsulitis of right shoulder  Ambulatory Referral to Physical Therapy        Scribe Attestation      I,:  Mi Nuno MA am acting as a scribe while in the presence of the attending physician.:       I,:  Bret Azul MD personally performed the services described in this documentation    as scribed in my presence.:             Angela has been treating for right shoulder rotator cuff tendinitis, impingement and subacromial bursitis. It was discussed with Angela that she has since developed adhesive capsulitis as her ROM has significantly decreased since her previous visit. Treatment options were discussed in the form of continued PT, a intra-articular injection or surgical intervention in the form of ODELL. The decision was made to proceed with a right shoulder glenohumeral joint CSI. Right shoulder glenohumeral joint CSI was performed in the office without complication. If the CSI and resuming PT is not significantly beneficial for her, she would like to discuss surgical intervention. I will see her back in 3 weeks time for clinical re-evaluation, and we will further discuss options.     Large joint arthrocentesis: R glenohumeral  Universal Protocol:  procedure performed by consultantConsent: Verbal consent obtained. Written consent not obtained.  Risks and benefits: risks, benefits and alternatives were discussed  Consent given by: patient  Patient understanding: patient states understanding of the procedure being performed  Site marked: the operative site was marked  Patient identity confirmed: verbally with patient  Supporting Documentation  Indications: pain   Procedure Details  Location: shoulder - R glenohumeral  Needle size: 22 G  Ultrasound guidance: no  Medications administered: 20 mg triamcinolone acetonide 40 mg/mL; 4 mL ropivacaine 0.2 %    Patient tolerance: patient tolerated the procedure well with no immediate complications  Dressing:  Sterile dressing  applied          Subjective:   Angela Martin is a 39 y.o. female who presents to the office for a follow up regarding her right shoulder. She notes continued pain to her right shoulder. Pain will worsen with use or movement. She feels ROM has decreased. She states she has no pain is does does nothing. She states she swatted a bug yesterday and was sobbing due to pain. She has been attending PT. She is taking Aleve for pain control.       Review of Systems   Constitutional:  Negative for chills, fever and unexpected weight change.   HENT:  Negative for hearing loss, nosebleeds and sore throat.    Eyes:  Negative for pain, redness and visual disturbance.   Respiratory:  Negative for cough, shortness of breath and wheezing.    Cardiovascular:  Negative for chest pain, palpitations and leg swelling.   Gastrointestinal:  Negative for abdominal pain, nausea and vomiting.   Endocrine: Negative for polydipsia and polyuria.   Genitourinary:  Negative for difficulty urinating and hematuria.   Musculoskeletal:  Negative for arthralgias, joint swelling and myalgias.   Skin:  Negative for rash and wound.   Neurological:  Negative for dizziness, numbness and headaches.   Psychiatric/Behavioral:  Negative for decreased concentration, dysphoric mood and suicidal ideas. The patient is not nervous/anxious.          Past Medical History:   Diagnosis Date    ADHD (attention deficit hyperactivity disorder)     Anemia     KVNG (generalized anxiety disorder)     History of transfusion 10/03/2008    Hemorrhage during child birth    Migraines     Miscarriage     , , ,        Past Surgical History:   Procedure Laterality Date     SECTION  12    KIDNEY SURGERY  2003    Hydronephorsis while pregnant (pardon the spelling)    TUBAL LIGATION Bilateral        Family History   Problem Relation Age of Onset    COPD Mother     Endometriosis Mother     Diabetes Mother     Learning disabilities Mother     Neurological  problems Mother     Asthma Mother     Heart disease Mother     Seizures Mother     No Known Problems Father     Learning disabilities Sister     Neurological problems Sister     No Known Problems Sister     No Known Problems Sister     No Known Problems Daughter        Social History     Occupational History    Not on file   Tobacco Use    Smoking status: Never     Passive exposure: Never    Smokeless tobacco: Never   Vaping Use    Vaping status: Never Used   Substance and Sexual Activity    Alcohol use: Not Currently     Alcohol/week: 1.0 standard drink of alcohol     Types: 1 Standard drinks or equivalent per week     Comment: Drink socially    Drug use: Never    Sexual activity: Yes     Partners: Male     Birth control/protection: Female Sterilization     Comment: 12 years          Current Outpatient Medications:     ferrous sulfate 324 (65 Fe) mg, Take 1 tablet (324 mg total) by mouth 3 (three) times a week Monday, Wednesday, Friday, Disp: 30 tablet, Rfl: 3    Allergies   Allergen Reactions    Tetanus-Diphtheria Toxoids Td Swelling       Objective:  Vitals:    10/15/24 1438   BP: 110/72   Pulse: 87       Right Shoulder Exam     Range of Motion   Active abduction:  80   External rotation:  30   Forward flexion:  110   Right shoulder internal rotation 0 degrees: -20.       Left Shoulder Exam     Range of Motion   Active abduction:  100   Left shoulder external rotation: past 90.   Forward flexion:  180   Left shoulder internal rotation 0 degrees: 70.            Physical Exam  Vitals and nursing note reviewed.   Constitutional:       Appearance: Normal appearance. She is well-developed.   HENT:      Head: Normocephalic.      Right Ear: External ear normal.      Left Ear: External ear normal.      Nose: Nose normal.   Eyes:      Extraocular Movements: Extraocular movements intact.      Conjunctiva/sclera: Conjunctivae normal.   Cardiovascular:      Rate and Rhythm: Normal rate.      Pulses: Normal pulses.    Pulmonary:      Effort: Pulmonary effort is normal. No respiratory distress.   Abdominal:      Palpations: Abdomen is soft.   Musculoskeletal:      Cervical back: Normal range of motion.      Comments: See ortho exam    Skin:     General: Skin is warm and dry.      Capillary Refill: Capillary refill takes less than 2 seconds.   Neurological:      General: No focal deficit present.      Mental Status: She is alert and oriented to person, place, and time.   Psychiatric:         Mood and Affect: Mood normal.         Behavior: Behavior normal.         Thought Content: Thought content normal.         Judgment: Judgment normal.       I have personally reviewed pertinent films in PACS and my interpretation is as follows:  No new imaging to review       This document was created using speech voice recognition software.   Grammatical errors, random word insertions, pronoun errors, and incomplete sentences are an occasional consequence of this system due to software limitations, ambient noise, and hardware issues.   Any formal questions or concerns about content, text, or information contained within the body of this dictation should be directly addressed to the provider for clarification.

## 2024-10-17 RX ORDER — ROPIVACAINE HYDROCHLORIDE 2 MG/ML
4 INJECTION, SOLUTION EPIDURAL; INFILTRATION; PERINEURAL
Status: COMPLETED | OUTPATIENT
Start: 2024-10-15 | End: 2024-10-15

## 2024-10-17 RX ORDER — TRIAMCINOLONE ACETONIDE 40 MG/ML
20 INJECTION, SUSPENSION INTRA-ARTICULAR; INTRAMUSCULAR
Status: COMPLETED | OUTPATIENT
Start: 2024-10-15 | End: 2024-10-15

## 2024-10-25 ENCOUNTER — OFFICE VISIT (OUTPATIENT)
Dept: OBGYN CLINIC | Facility: CLINIC | Age: 39
End: 2024-10-25
Payer: COMMERCIAL

## 2024-10-25 VITALS
SYSTOLIC BLOOD PRESSURE: 106 MMHG | HEIGHT: 62 IN | DIASTOLIC BLOOD PRESSURE: 80 MMHG | BODY MASS INDEX: 25.76 KG/M2 | WEIGHT: 140 LBS

## 2024-10-25 DIAGNOSIS — M62.89 HIGH-TONE PELVIC FLOOR DYSFUNCTION IN FEMALE: ICD-10-CM

## 2024-10-25 DIAGNOSIS — N94.10 DYSPAREUNIA IN FEMALE: ICD-10-CM

## 2024-10-25 DIAGNOSIS — R61 NIGHT SWEATS: ICD-10-CM

## 2024-10-25 DIAGNOSIS — Z01.419 WOMEN'S ANNUAL ROUTINE GYNECOLOGICAL EXAMINATION: Primary | ICD-10-CM

## 2024-10-25 DIAGNOSIS — R23.2 HOT FLASHES: ICD-10-CM

## 2024-10-25 PROCEDURE — 99396 PREV VISIT EST AGE 40-64: CPT | Performed by: STUDENT IN AN ORGANIZED HEALTH CARE EDUCATION/TRAINING PROGRAM

## 2024-10-25 NOTE — PROGRESS NOTES
[facility]   Annual Well Woman Exam  [place]    ASSESSMENT & PLAN: Angela Martin is a 39 y.o.  with normal gynecologic exam.    1.  Routine well woman exam done today.  2.  Pap and HPV: Pap with HPV was not done today.  Current ASCCP Guidelines reviewed.  3.  Mammogram follow-up ordered by PCP.  4.  Angela is low risk and does not need or desire STI testing today   5.  Angela is scheduled for LAVH with Dr. Barcenas on . Urogyn follow-up afterwards reviewed LUIS ALFREDO symptoms can improve or worsen after hyst, so reasonable to have follow-up afterwards if there's nothing available sooner.   6. The following were reviewed in today's visit: adequate intake of calcium and vitamin D, exercise, healthy diet, and PFPT for LUIS ALFREDO/dyspareunia/high tone pelvic floor.  7. Return to office in for follow-up after hyst    All questions answered to the best of my ability.      Subjective:    CC:  Annual Gynecologic Examination    HPI: Angela Martin is a 39 y.o.  who presents for annual gynecologic examination.      She has the following concerns:  irregular cycles, usually very heavy and painful, ready for hysterectomy as scheduled 2024  Sometimes regular and sometimes delayed, longest in between 54 days  Had endometrial biopsy in [place], several years ago, awful experience does not want to have to repeat     Pap 2023: NILM, Hpv neg  Gardasil: unsure, thinks she has  Mammo 10/2024:  BIRADS0, follow-up imaging scheduled 24    Colonoscopy: never  No family colon cancer, uterine, ovarian, or breast cancer  Grandmother had kidney cancer (maternal)    ++migraines, feels like hormonal  No aura  Always right sided     No tobacco use  No hx of CHTN    She reports hot flashes, night sweats, insomnia, other symptoms of menopause.  Sexually active: yes, , some dyspareunia (positional)  Hx STI: denies, declines testing    OB       G/U  LUIS ALFREDO, urology follow-up scheduled end of december  Denies dysuria  or hematuria    Breast  Complaints: denies  Denies: breast lump, nipple discharge, and skin color change      Health Maintenance:    She does follow with a PCP.  She exercises--walks and does PT with JOCELYNN  She does follow a well balanced diet.    She does not use tobacco.  She feels safe at home (kids, JOCELYNN, and ) and domestic violence.    Past Medical History:   Diagnosis Date    ADHD (attention deficit hyperactivity disorder)     Anemia     KVNG (generalized anxiety disorder)     History of transfusion 10/03/2008    Hemorrhage during child birth    Migraines     Miscarriage     , , ,        Past Surgical History:   Procedure Laterality Date     SECTION  12    KIDNEY SURGERY      Hydronephorsis while pregnant (pardon the spelling)    TUBAL LIGATION Bilateral        Past OB/Gyn History:     Patient's last menstrual period was 10/24/2024 (exact date).    Family History:  Family History   Problem Relation Age of Onset    COPD Mother     Endometriosis Mother     Diabetes Mother     Learning disabilities Mother     Neurological problems Mother     Asthma Mother     Heart disease Mother     Seizures Mother     No Known Problems Father     Learning disabilities Sister     Neurological problems Sister     No Known Problems Sister     No Known Problems Sister     No Known Problems Daughter        Social History:  Social History     Socioeconomic History    Marital status: /Civil Union     Spouse name: Not on file    Number of children: Not on file    Years of education: Not on file    Highest education level: Not on file   Occupational History    Not on file   Tobacco Use    Smoking status: Never     Passive exposure: Never    Smokeless tobacco: Never   Vaping Use    Vaping status: Never Used   Substance and Sexual Activity    Alcohol use: Not Currently     Alcohol/week: 1.0 standard drink of alcohol     Types: 1 Standard drinks or equivalent per week     Comment: Drink  "socially    Drug use: Never    Sexual activity: Yes     Partners: Male     Birth control/protection: Female Sterilization     Comment: 12 years    Other Topics Concern    Not on file   Social History Narrative    Not on file     Social Determinants of Health     Financial Resource Strain: Not on file   Food Insecurity: Not on file   Transportation Needs: Not on file   Physical Activity: Not on file   Stress: Not on file   Social Connections: Not on file   Intimate Partner Violence: Not At Risk (8/17/2023)    Received from [facility]     Threatened: Not on file     Insulted: Not on file     Physically Hurt : Not on file     Scream: Not on file   Housing Stability: At Risk (8/17/2023)    Received from [facility]     Living Situation: Not on file     Housing Problems: Not on file     Patient is currently employed--    Allergies   Allergen Reactions    Tetanus-Diphtheria Toxoids Td Swelling       Current Outpatient Medications:     ferrous sulfate 324 (65 Fe) mg, Take 1 tablet (324 mg total) by mouth 3 (three) times a week Monday, Wednesday, Friday (Patient not taking: Reported on 10/25/2024), Disp: 30 tablet, Rfl: 3    Review of Systems:  Denies chest pain, shortness of breath, abdominal pain, vaginal discharge. All other systems negative unless otherwise stated.     Physical Exam:  /80 (BP Location: Right arm, Patient Position: Sitting)   Ht 5' 2\" (1.575 m)   Wt 63.5 kg (140 lb)   LMP 10/24/2024 (Exact Date)   BMI 25.61 kg/m²  Body mass index is 25.61 kg/m².   GEN: The patient was alert and oriented x3, pleasant well-appearing female in no acute distress.   HEENT:  Unremarkable, no anterior or posterior lymphadenopathy, no thyromegaly  CV:  Regular rate and rhythm, normal S1 and S2, no murmurs  RESP:  Clear to auscultation bilaterally, no wheezes, rales or rhonchi  BREAST:  Symmetric breasts with no palpable breast " masses or obvious breast lesions. She has no retractions or nipple discharge. She has no axillary abnormalities or palpable masses.   GI:  Soft, nontender, non-distended  MSK: bilateral lower extremities are nontender, no edema  : Normal appearing external female genitalia, normal appearing urethral meatus. On sterile speculum exam, normal appearing vaginal epithelium, no vaginal discharge, ++ bleeding c/w current menses, grossly normal appearing cervix. On bimanual exam, no cervical motion tenderness; uterus is smooth, mobile, slightly enlarged. No tenderness or fullness in the bilateral adnexa. Pelvic floor muscles ttp, L>>R.

## 2024-11-05 ENCOUNTER — APPOINTMENT (OUTPATIENT)
Dept: LAB | Facility: HOSPITAL | Age: 39
End: 2024-11-05
Payer: COMMERCIAL

## 2024-11-05 ENCOUNTER — OFFICE VISIT (OUTPATIENT)
Dept: OBGYN CLINIC | Facility: CLINIC | Age: 39
End: 2024-11-05
Payer: COMMERCIAL

## 2024-11-05 ENCOUNTER — APPOINTMENT (OUTPATIENT)
Dept: LAB | Facility: HOSPITAL | Age: 39
End: 2024-11-05
Attending: ORTHOPAEDIC SURGERY
Payer: COMMERCIAL

## 2024-11-05 ENCOUNTER — PREP FOR PROCEDURE (OUTPATIENT)
Dept: OBGYN CLINIC | Facility: CLINIC | Age: 39
End: 2024-11-05

## 2024-11-05 VITALS
DIASTOLIC BLOOD PRESSURE: 69 MMHG | SYSTOLIC BLOOD PRESSURE: 102 MMHG | BODY MASS INDEX: 25.76 KG/M2 | HEART RATE: 80 BPM | WEIGHT: 140 LBS | HEIGHT: 62 IN

## 2024-11-05 DIAGNOSIS — M75.01 ADHESIVE CAPSULITIS OF RIGHT SHOULDER: ICD-10-CM

## 2024-11-05 DIAGNOSIS — R61 NIGHT SWEATS: ICD-10-CM

## 2024-11-05 DIAGNOSIS — N92.0 MENORRHAGIA WITH REGULAR CYCLE: ICD-10-CM

## 2024-11-05 DIAGNOSIS — M75.01 ADHESIVE CAPSULITIS OF RIGHT SHOULDER: Primary | ICD-10-CM

## 2024-11-05 DIAGNOSIS — R23.2 HOT FLASHES: ICD-10-CM

## 2024-11-05 DIAGNOSIS — M25.511 CHRONIC RIGHT SHOULDER PAIN: ICD-10-CM

## 2024-11-05 DIAGNOSIS — D64.9 ANEMIA, UNSPECIFIED TYPE: ICD-10-CM

## 2024-11-05 DIAGNOSIS — G89.29 CHRONIC RIGHT SHOULDER PAIN: ICD-10-CM

## 2024-11-05 LAB
ALBUMIN SERPL BCG-MCNC: 4.3 G/DL (ref 3.5–5)
ALP SERPL-CCNC: 60 U/L (ref 34–104)
ALT SERPL W P-5'-P-CCNC: 19 U/L (ref 7–52)
ANION GAP SERPL CALCULATED.3IONS-SCNC: 5 MMOL/L (ref 4–13)
APTT PPP: 32 SECONDS (ref 23–34)
AST SERPL W P-5'-P-CCNC: 15 U/L (ref 13–39)
BILIRUB SERPL-MCNC: 0.33 MG/DL (ref 0.2–1)
BUN SERPL-MCNC: 17 MG/DL (ref 5–25)
CALCIUM SERPL-MCNC: 9.2 MG/DL (ref 8.4–10.2)
CHLORIDE SERPL-SCNC: 105 MMOL/L (ref 96–108)
CO2 SERPL-SCNC: 29 MMOL/L (ref 21–32)
CREAT SERPL-MCNC: 0.7 MG/DL (ref 0.6–1.3)
ERYTHROCYTE [DISTWIDTH] IN BLOOD BY AUTOMATED COUNT: 13.6 % (ref 11.6–15.1)
EST. AVERAGE GLUCOSE BLD GHB EST-MCNC: 94 MG/DL
GFR SERPL CREATININE-BSD FRML MDRD: 108 ML/MIN/1.73SQ M
GLUCOSE P FAST SERPL-MCNC: 83 MG/DL (ref 65–99)
HBA1C MFR BLD: 4.9 %
HCT VFR BLD AUTO: 39.6 % (ref 34.8–46.1)
HGB BLD-MCNC: 12.5 G/DL (ref 11.5–15.4)
INR PPP: 0.89 (ref 0.85–1.19)
MCH RBC QN AUTO: 25.7 PG (ref 26.8–34.3)
MCHC RBC AUTO-ENTMCNC: 31.6 G/DL (ref 31.4–37.4)
MCV RBC AUTO: 81 FL (ref 82–98)
PLATELET # BLD AUTO: 229 THOUSANDS/UL (ref 149–390)
PMV BLD AUTO: 11.5 FL (ref 8.9–12.7)
POTASSIUM SERPL-SCNC: 4 MMOL/L (ref 3.5–5.3)
PROT SERPL-MCNC: 6.9 G/DL (ref 6.4–8.4)
PROTHROMBIN TIME: 12.6 SECONDS (ref 12.3–15)
RBC # BLD AUTO: 4.87 MILLION/UL (ref 3.81–5.12)
SODIUM SERPL-SCNC: 139 MMOL/L (ref 135–147)
WBC # BLD AUTO: 6.31 THOUSAND/UL (ref 4.31–10.16)

## 2024-11-05 PROCEDURE — 85730 THROMBOPLASTIN TIME PARTIAL: CPT

## 2024-11-05 PROCEDURE — 80053 COMPREHEN METABOLIC PANEL: CPT

## 2024-11-05 PROCEDURE — 85610 PROTHROMBIN TIME: CPT

## 2024-11-05 PROCEDURE — 36415 COLL VENOUS BLD VENIPUNCTURE: CPT

## 2024-11-05 PROCEDURE — 83036 HEMOGLOBIN GLYCOSYLATED A1C: CPT

## 2024-11-05 PROCEDURE — 99214 OFFICE O/P EST MOD 30 MIN: CPT | Performed by: ORTHOPAEDIC SURGERY

## 2024-11-05 PROCEDURE — 85027 COMPLETE CBC AUTOMATED: CPT

## 2024-11-05 RX ORDER — CHLORHEXIDINE GLUCONATE ORAL RINSE 1.2 MG/ML
15 SOLUTION DENTAL ONCE
Status: CANCELLED | OUTPATIENT
Start: 2024-11-05 | End: 2024-11-05

## 2024-11-05 NOTE — PRE-PROCEDURE INSTRUCTIONS
Pre-Surgery Instructions:   Medication Instructions    ferrous sulfate 324 (65 Fe) mg Stop taking 7 days prior to surgery.    Medication instructions for day surgery reviewed. Please use only a sip of water to take your instructed medications. Avoid all over the counter vitamins, supplements and NSAIDS for one week prior to surgery per anesthesia guidelines. Tylenol is ok to take as needed.     You will receive a call one business day prior to surgery with an arrival time and hospital directions. If your surgery is scheduled on a Monday, the hospital will be calling you on the Friday prior to your surgery. If you have not heard from anyone by 8pm, please call the hospital supervisor through the hospital  at [phone]. ([place] [phone] or [place] [phone]).    Do not eat or drink anything after midnight the night before your surgery, including candy, mints, lifesavers, or chewing gum. Do not drink alcohol 24hrs before your surgery. Try not to smoke at least 24hrs before your surgery.       Follow the pre surgery showering instructions as listed in the “My Surgical Experience Booklet” or otherwise provided by your surgeon's office. Do not use a blade to shave the surgical area 1 week before surgery. It is okay to use a clean electric clippers up to 24 hours before surgery. Do not apply any lotions, creams, including makeup, cologne, deodorant, or perfumes after showering on the day of your surgery. Do not use dry shampoo, hair spray, hair gel, or any type of hair products.     No contact lenses, eye make-up, or artificial eyelashes. Remove nail polish, including gel polish, and any artificial, gel, or acrylic nails if possible. Remove all jewelry including rings and body piercing jewelry.     Wear causal clothing that is easy to take on and off. Consider your type of surgery.    Keep any valuables, jewelry, piercings at home. Please bring any specially ordered equipment (sling, braces) if  indicated.    Arrange for a responsible person to drive you to and from the hospital on the day of your surgery. Please confirm the visitor policy for the day of your procedure when you receive your phone call with an arrival time.     Call the surgeon's office with any new illnesses, exposures, or additional questions prior to surgery.    Please reference your “My Surgical Experience Booklet” for additional information to prepare for your upcoming surgery.

## 2024-11-05 NOTE — PROGRESS NOTES
Assessment/Plan:  1. Adhesive capsulitis of right shoulder  Case request operating room: ARTHROSCOPY SHOULDER, Lysis of Adhesions, Manipulation Under Anesthesia    Comprehensive metabolic panel    CBC and Platelet    Protime-INR    APTT    HEMOGLOBIN A1C W/ EAG ESTIMATION    EKG 12 lead    Case request operating room: ARTHROSCOPY SHOULDER, Lysis of Adhesions, Manipulation Under Anesthesia      2. Chronic right shoulder pain          Scribe Attestation      I,:  Angela Foley am acting as a scribe while in the presence of the attending physician.:       I,:  Bret Azul MD personally performed the services described in this documentation    as scribed in my presence.:           Angela is a pleasant 39 y.o. female who presents for follow up evaluation of right shoulder adhesive capsulitis. Patient has tried and failed conservative treatments including but not limited to physical therapy, repeat corticosteroid injections, activity modification, and OTC analgesics. Due to ongoing nature of patients pain and limits in range of motion on exam I do recommend surgical intervention in the form of lysis of adhesions and manipulation under anesthesia. Risks and benefits discussed, pre and post operative expectations reviewed. Consent was signed in clinic today, she will meet with my surgery scheduler. Physical therapy will start the day after surgery. We will see her back in clinic post operatively.       Given that the patient has failed conservative treatment and continues to have severe pain and/or dysfunction that limits their activities of daily living, they would like to proceed with operative intervention.  We discussed risks, benefits and alternatives to surgery today in the clinic. We discussed with the patient the risks of no treatment, non-operative treatment, and operative treatment. The risks of operative intervention were discussed and include but are not limited to: Infection, bleeding, stiffness,  loss of range of motion, blood clot, failure of surgery, fracture, delayed union, nonunion, malunion, risk of potential future arthritis, continued problems with swelling, injury to surrounding structures/nerve/artery/vein, recurrence of cysts, failure of hardware, retained hardware and/or foreign body, symptomatic hardware, and continued instability, pain, dysfunction, or disability despite repair.     Specifically, for         ODELL, ALIYAH Shoulder:  Fracture, Arthrofibrosis and loss of motion needing further treatment, Continued pain/CPRS        Subjective:   Angela Martin is a 39 y.o. female who presents for follow up evaluation of right shoulder pain and limitations in AROM secondary to adhesive capsulitis. At this time patient has tried and failed physical therapy, 2 corticosteroid injections, a medrol dose pack, and OTC analgesics with out relief. Patient notes constant aching pain that increases in severity with movement.       Review of Systems   Constitutional:  Negative for chills and fever.   HENT:  Negative for ear pain and sore throat.    Eyes:  Negative for pain and visual disturbance.   Respiratory:  Negative for cough and shortness of breath.    Cardiovascular:  Negative for chest pain and palpitations.   Gastrointestinal:  Negative for abdominal pain and vomiting.   Genitourinary:  Negative for dysuria and hematuria.   Musculoskeletal:  Negative for arthralgias and back pain.   Skin:  Negative for color change and rash.   Neurological:  Negative for seizures and syncope.   All other systems reviewed and are negative.        Past Medical History:   Diagnosis Date    ADHD (attention deficit hyperactivity disorder)     Anemia     KVNG (generalized anxiety disorder)     History of transfusion 10/03/2008    Hemorrhage during child birth    Migraines     Miscarriage     , , ,        Past Surgical History:   Procedure Laterality Date     SECTION  12    KIDNEY SURGERY       Hydronephorsis while pregnant (pardon the spelling)    TUBAL LIGATION Bilateral 2012       Family History   Problem Relation Age of Onset    COPD Mother     Endometriosis Mother     Diabetes Mother     Learning disabilities Mother     Neurological problems Mother     Asthma Mother     Heart disease Mother     Seizures Mother     No Known Problems Father     Learning disabilities Sister     Neurological problems Sister     No Known Problems Sister     No Known Problems Sister     No Known Problems Daughter        Social History     Occupational History    Not on file   Tobacco Use    Smoking status: Never     Passive exposure: Never    Smokeless tobacco: Never   Vaping Use    Vaping status: Never Used   Substance and Sexual Activity    Alcohol use: Not Currently     Alcohol/week: 1.0 standard drink of alcohol     Types: 1 Standard drinks or equivalent per week     Comment: Drink socially    Drug use: Never    Sexual activity: Yes     Partners: Male     Birth control/protection: Female Sterilization     Comment: 12 years          Current Outpatient Medications:     ferrous sulfate 324 (65 Fe) mg, Take 1 tablet (324 mg total) by mouth 3 (three) times a week Monday, Wednesday, Friday (Patient not taking: Reported on 10/25/2024), Disp: 30 tablet, Rfl: 3    Allergies   Allergen Reactions    Tetanus-Diphtheria Toxoids Td Swelling       Objective:  Vitals:    11/05/24 0800   BP: 102/69   Pulse: 80       Right Shoulder Exam     Range of Motion   Active abduction:  80   External rotation:  30   Forward flexion:  90     Other   Erythema: absent  Sensation: normal  Pulse: present    Comments:  ER at 90 degrees abduction 30 degrees  IR at 90 degrees abduction - 10 degrees  Skin is warm and dry to touch with no signs of erythema, ecchymosis, or infection  Demonstrates normal elbow, wrist, and finger motion  2+  distal radial pulse with brisk capillary refill to the fingers  Radial, median, ulnar and motor  and sensory  distribution intact  Sensation to light touch intact distally                Physical Exam  Vitals and nursing note reviewed.   Constitutional:       Appearance: Normal appearance.   HENT:      Head: Normocephalic.      Right Ear: External ear normal.      Left Ear: External ear normal.   Eyes:      Extraocular Movements: Extraocular movements intact.      Conjunctiva/sclera: Conjunctivae normal.   Cardiovascular:      Rate and Rhythm: Normal rate.      Pulses: Normal pulses.   Pulmonary:      Effort: Pulmonary effort is normal.      Breath sounds: Normal breath sounds.   Abdominal:      General: Abdomen is flat.   Musculoskeletal:         General: Normal range of motion.      Cervical back: Normal range of motion and neck supple.      Comments: See ortho exam   Skin:     General: Skin is warm and dry.   Neurological:      General: No focal deficit present.      Mental Status: She is alert.   Psychiatric:         Mood and Affect: Mood normal.         Behavior: Behavior normal.         I have personally reviewed pertinent films in PACS and my interpretation is as follows:  No new images obtained today       This document was created using speech voice recognition software.   Grammatical errors, random word insertions, pronoun errors, and incomplete sentences are an occasional consequence of this system due to software limitations, ambient noise, and hardware issues.   Any formal questions or concerns about content, text, or information contained within the body of this dictation should be directly addressed to the provider for clarification.

## 2024-11-07 LAB
ATRIAL RATE: 66 BPM
P AXIS: 28 DEGREES
PR INTERVAL: 134 MS
QRS AXIS: 47 DEGREES
QRSD INTERVAL: 78 MS
QT INTERVAL: 366 MS
QTC INTERVAL: 384 MS
T WAVE AXIS: 29 DEGREES
VENTRICULAR RATE: 66 BPM

## 2024-11-07 PROCEDURE — 93010 ELECTROCARDIOGRAM REPORT: CPT | Performed by: INTERNAL MEDICINE

## 2024-11-13 ENCOUNTER — ANESTHESIA EVENT (OUTPATIENT)
Dept: PERIOP | Facility: HOSPITAL | Age: 39
End: 2024-11-13
Payer: COMMERCIAL

## 2024-11-13 PROBLEM — Z98.51 S/P TUBAL LIGATION: Status: ACTIVE | Noted: 2024-11-13

## 2024-11-13 NOTE — ANESTHESIA PREPROCEDURE EVALUATION
Procedure:  ARTHROSCOPY SHOULDER, Lysis of Adhesions, Manipulation Under Anesthesia (Right: Shoulder)    Relevant Problems   CARDIO   (+) Intractable migraine without aura and without status migrainosus      HEMATOLOGY   (+) Alpha thalassemia trait      MUSCULOSKELETAL   (+) Adhesive capsulitis of right shoulder      NEURO/PSYCH   (+) Intractable migraine without aura and without status migrainosus      Obstetrics/Gynecology   (+) S/P tubal ligation        Physical Exam    Airway    Mallampati score: II  TM Distance: >3 FB  Neck ROM: full     Dental       Cardiovascular  Rhythm: regular, Rate: normal    Pulmonary   Breath sounds clear to auscultation    Other Findings  post-pubertal.      Anesthesia Plan  ASA Score- 2     Anesthesia Type- general with ASA Monitors.         Additional Monitors:     Airway Plan: ETT.    Comment: GA with ETT; pre-procedure interscalene block.       Plan Factors-    Chart reviewed.        Patient is not a current smoker.              Induction- intravenous.    Postoperative Plan- Plan for postoperative opioid use.     Perioperative Resuscitation Plan - Level 1 - Full Code.       Informed Consent- Anesthetic plan and risks discussed with patient.  I personally reviewed this patient with the CRNA. Discussed and agreed on the Anesthesia Plan with the CRNA..

## 2024-11-14 ENCOUNTER — ANESTHESIA (OUTPATIENT)
Dept: PERIOP | Facility: HOSPITAL | Age: 39
End: 2024-11-14
Payer: COMMERCIAL

## 2024-11-14 ENCOUNTER — HOSPITAL ENCOUNTER (OUTPATIENT)
Facility: HOSPITAL | Age: 39
Setting detail: OUTPATIENT SURGERY
Discharge: HOME | End: 2024-11-14
Attending: ORTHOPAEDIC SURGERY | Admitting: ORTHOPAEDIC SURGERY
Payer: COMMERCIAL

## 2024-11-14 VITALS
TEMPERATURE: 97.1 F | DIASTOLIC BLOOD PRESSURE: 60 MMHG | SYSTOLIC BLOOD PRESSURE: 102 MMHG | OXYGEN SATURATION: 95 % | HEART RATE: 75 BPM | RESPIRATION RATE: 18 BRPM

## 2024-11-14 DIAGNOSIS — M75.01 ADHESIVE CAPSULITIS OF RIGHT SHOULDER: Primary | ICD-10-CM

## 2024-11-14 LAB
EXT PREGNANCY TEST URINE: NEGATIVE
EXT. CONTROL: NORMAL

## 2024-11-14 PROCEDURE — 81025 URINE PREGNANCY TEST: CPT | Performed by: ORTHOPAEDIC SURGERY

## 2024-11-14 PROCEDURE — 29825 SHO ARTHRS SRG LSS&RESCJ ADS: CPT | Performed by: ORTHOPAEDIC SURGERY

## 2024-11-14 PROCEDURE — C9290 INJ, BUPIVACAINE LIPOSOME: HCPCS | Performed by: ANESTHESIOLOGY

## 2024-11-14 PROCEDURE — 29825 SHO ARTHRS SRG LSS&RESCJ ADS: CPT | Performed by: PHYSICIAN ASSISTANT

## 2024-11-14 RX ORDER — ONDANSETRON 4 MG/1
4 TABLET, FILM COATED ORAL EVERY 8 HOURS PRN
Qty: 20 TABLET | Refills: 0 | Status: SHIPPED | OUTPATIENT
Start: 2024-11-14

## 2024-11-14 RX ORDER — ACETAMINOPHEN 500 MG
1000 TABLET ORAL EVERY 8 HOURS
Qty: 42 TABLET | Refills: 0 | Status: SHIPPED | OUTPATIENT
Start: 2024-11-14 | End: 2024-11-21

## 2024-11-14 RX ORDER — PROPOFOL 10 MG/ML
INJECTION, EMULSION INTRAVENOUS AS NEEDED
Status: DISCONTINUED | OUTPATIENT
Start: 2024-11-14 | End: 2024-11-14

## 2024-11-14 RX ORDER — LIDOCAINE HYDROCHLORIDE 10 MG/ML
INJECTION, SOLUTION EPIDURAL; INFILTRATION; INTRACAUDAL; PERINEURAL AS NEEDED
Status: DISCONTINUED | OUTPATIENT
Start: 2024-11-14 | End: 2024-11-14

## 2024-11-14 RX ORDER — OXYCODONE HYDROCHLORIDE 5 MG/1
5 TABLET ORAL EVERY 4 HOURS PRN
Qty: 15 TABLET | Refills: 0 | Status: SHIPPED | OUTPATIENT
Start: 2024-11-14

## 2024-11-14 RX ORDER — MIDAZOLAM HYDROCHLORIDE 2 MG/2ML
1 INJECTION, SOLUTION INTRAMUSCULAR; INTRAVENOUS ONCE
Status: DISCONTINUED | OUTPATIENT
Start: 2024-11-14 | End: 2024-11-14 | Stop reason: HOSPADM

## 2024-11-14 RX ORDER — HYDROMORPHONE HCL/PF 1 MG/ML
0.5 SYRINGE (ML) INJECTION
Status: DISCONTINUED | OUTPATIENT
Start: 2024-11-14 | End: 2024-11-14 | Stop reason: HOSPADM

## 2024-11-14 RX ORDER — MAGNESIUM HYDROXIDE 1200 MG/15ML
LIQUID ORAL AS NEEDED
Status: DISCONTINUED | OUTPATIENT
Start: 2024-11-14 | End: 2024-11-14 | Stop reason: HOSPADM

## 2024-11-14 RX ORDER — PROMETHAZINE HYDROCHLORIDE 25 MG/ML
25 INJECTION, SOLUTION INTRAMUSCULAR; INTRAVENOUS ONCE AS NEEDED
Status: DISCONTINUED | OUTPATIENT
Start: 2024-11-14 | End: 2024-11-14 | Stop reason: HOSPADM

## 2024-11-14 RX ORDER — ROCURONIUM BROMIDE 10 MG/ML
INJECTION, SOLUTION INTRAVENOUS AS NEEDED
Status: DISCONTINUED | OUTPATIENT
Start: 2024-11-14 | End: 2024-11-14

## 2024-11-14 RX ORDER — DEXAMETHASONE SODIUM PHOSPHATE 10 MG/ML
INJECTION, SOLUTION INTRAMUSCULAR; INTRAVENOUS AS NEEDED
Status: DISCONTINUED | OUTPATIENT
Start: 2024-11-14 | End: 2024-11-14

## 2024-11-14 RX ORDER — MIDAZOLAM HYDROCHLORIDE 2 MG/2ML
INJECTION, SOLUTION INTRAMUSCULAR; INTRAVENOUS AS NEEDED
Status: DISCONTINUED | OUTPATIENT
Start: 2024-11-14 | End: 2024-11-14

## 2024-11-14 RX ORDER — CEFAZOLIN SODIUM 2 G/50ML
2000 SOLUTION INTRAVENOUS ONCE
Status: DISCONTINUED | OUTPATIENT
Start: 2024-11-14 | End: 2024-11-14 | Stop reason: HOSPADM

## 2024-11-14 RX ORDER — SODIUM CHLORIDE, SODIUM LACTATE, POTASSIUM CHLORIDE, CALCIUM CHLORIDE 600; 310; 30; 20 MG/100ML; MG/100ML; MG/100ML; MG/100ML
75 INJECTION, SOLUTION INTRAVENOUS CONTINUOUS
Status: DISCONTINUED | OUTPATIENT
Start: 2024-11-14 | End: 2024-11-14 | Stop reason: HOSPADM

## 2024-11-14 RX ORDER — NAPROXEN 500 MG/1
500 TABLET ORAL 2 TIMES DAILY WITH MEALS
Qty: 28 TABLET | Refills: 0 | Status: SHIPPED | OUTPATIENT
Start: 2024-11-14 | End: 2024-11-28

## 2024-11-14 RX ORDER — SODIUM CHLORIDE, SODIUM LACTATE, POTASSIUM CHLORIDE, CALCIUM CHLORIDE 600; 310; 30; 20 MG/100ML; MG/100ML; MG/100ML; MG/100ML
INJECTION, SOLUTION INTRAVENOUS CONTINUOUS PRN
Status: DISCONTINUED | OUTPATIENT
Start: 2024-11-14 | End: 2024-11-14

## 2024-11-14 RX ORDER — ONDANSETRON 2 MG/ML
INJECTION INTRAMUSCULAR; INTRAVENOUS AS NEEDED
Status: DISCONTINUED | OUTPATIENT
Start: 2024-11-14 | End: 2024-11-14

## 2024-11-14 RX ORDER — SODIUM CHLORIDE 9 MG/ML
INJECTION, SOLUTION INTRAVENOUS AS NEEDED
Status: DISCONTINUED | OUTPATIENT
Start: 2024-11-14 | End: 2024-11-14 | Stop reason: HOSPADM

## 2024-11-14 RX ORDER — CHLORHEXIDINE GLUCONATE ORAL RINSE 1.2 MG/ML
15 SOLUTION DENTAL ONCE
Status: COMPLETED | OUTPATIENT
Start: 2024-11-14 | End: 2024-11-14

## 2024-11-14 RX ORDER — ASPIRIN 81 MG/1
81 TABLET, CHEWABLE ORAL 2 TIMES DAILY
Qty: 56 TABLET | Refills: 0 | Status: SHIPPED | OUTPATIENT
Start: 2024-11-14 | End: 2024-12-12

## 2024-11-14 RX ORDER — CEFAZOLIN SODIUM 2 G/50ML
SOLUTION INTRAVENOUS AS NEEDED
Status: DISCONTINUED | OUTPATIENT
Start: 2024-11-14 | End: 2024-11-14

## 2024-11-14 RX ORDER — FENTANYL CITRATE/PF 50 MCG/ML
50 SYRINGE (ML) INJECTION
Status: DISCONTINUED | OUTPATIENT
Start: 2024-11-14 | End: 2024-11-14 | Stop reason: HOSPADM

## 2024-11-14 RX ORDER — FENTANYL CITRATE 50 UG/ML
INJECTION, SOLUTION INTRAMUSCULAR; INTRAVENOUS AS NEEDED
Status: DISCONTINUED | OUTPATIENT
Start: 2024-11-14 | End: 2024-11-14

## 2024-11-14 RX ORDER — BUPIVACAINE HYDROCHLORIDE 5 MG/ML
INJECTION, SOLUTION EPIDURAL; INTRACAUDAL
Status: COMPLETED | OUTPATIENT
Start: 2024-11-14 | End: 2024-11-14

## 2024-11-14 RX ADMIN — SODIUM CHLORIDE, SODIUM LACTATE, POTASSIUM CHLORIDE, AND CALCIUM CHLORIDE: .6; .31; .03; .02 INJECTION, SOLUTION INTRAVENOUS at 07:20

## 2024-11-14 RX ADMIN — MIDAZOLAM 2 MG: 1 INJECTION INTRAMUSCULAR; INTRAVENOUS at 07:21

## 2024-11-14 RX ADMIN — FENTANYL CITRATE 50 MCG: 50 INJECTION, SOLUTION INTRAMUSCULAR; INTRAVENOUS at 07:21

## 2024-11-14 RX ADMIN — PROPOFOL 200 MG: 10 INJECTION, EMULSION INTRAVENOUS at 07:37

## 2024-11-14 RX ADMIN — FENTANYL CITRATE 50 MCG: 50 INJECTION, SOLUTION INTRAMUSCULAR; INTRAVENOUS at 07:37

## 2024-11-14 RX ADMIN — ROCURONIUM BROMIDE 50 MG: 10 INJECTION, SOLUTION INTRAVENOUS at 07:37

## 2024-11-14 RX ADMIN — CEFAZOLIN SODIUM 2000 MG: 2 SOLUTION INTRAVENOUS at 07:32

## 2024-11-14 RX ADMIN — ONDANSETRON 4 MG: 2 INJECTION INTRAMUSCULAR; INTRAVENOUS at 07:54

## 2024-11-14 RX ADMIN — BUPIVACAINE HYDROCHLORIDE 5 ML: 5 INJECTION, SOLUTION EPIDURAL; INTRACAUDAL; PERINEURAL at 07:25

## 2024-11-14 RX ADMIN — SUGAMMADEX 200 MG: 100 INJECTION, SOLUTION INTRAVENOUS at 08:25

## 2024-11-14 RX ADMIN — LIDOCAINE HYDROCHLORIDE 50 MG: 10 INJECTION, SOLUTION EPIDURAL; INFILTRATION; INTRACAUDAL; PERINEURAL at 07:37

## 2024-11-14 RX ADMIN — SODIUM CHLORIDE, SODIUM LACTATE, POTASSIUM CHLORIDE, AND CALCIUM CHLORIDE 75 ML/HR: .6; .31; .03; .02 INJECTION, SOLUTION INTRAVENOUS at 06:36

## 2024-11-14 RX ADMIN — DEXAMETHASONE SODIUM PHOSPHATE 10 MG: 10 INJECTION, SOLUTION INTRAMUSCULAR; INTRAVENOUS at 07:53

## 2024-11-14 RX ADMIN — CHLORHEXIDINE GLUCONATE 15 ML: 1.2 RINSE ORAL at 06:36

## 2024-11-14 RX ADMIN — BUPIVACAINE 20 ML: 13.3 INJECTION, SUSPENSION, LIPOSOMAL INFILTRATION at 07:25

## 2024-11-14 NOTE — INTERVAL H&P NOTE
H&P reviewed. After examining the patient I find no changes in the patients condition since the H&P had been written.    Vitals:    11/14/24 0630   BP: 112/54   Pulse: 73   Resp: 16   Temp: 97.7 °F (36.5 °C)   SpO2: 98%

## 2024-11-14 NOTE — ANESTHESIA POSTPROCEDURE EVALUATION
Post-Op Assessment Note    CV Status:  Stable  Pain Score: 0    Pain management: adequate    Multimodal analgesia used between 6 hours prior to anesthesia start to PACU discharge    Mental Status:  Anxious   Hydration Status:  Stable   PONV Controlled:  None   Airway Patency:  Patent  Two or more mitigation strategies used for obstructive sleep apnea   Post Op Vitals Reviewed: Yes    No anethesia notable event occurred.    Staff: CRNA           Last Filed PACU Vitals:  Vitals Value Taken Time   Temp 97.1    Pulse 115    /68    Resp 22    SpO2 99        Modified Randal:  No data recorded

## 2024-11-14 NOTE — ANESTHESIA POSTPROCEDURE EVALUATION
Post-Op Assessment Note    CV Status:  Stable  Pain Score: 0    Pain management: adequate       Mental Status:  Awake   Hydration Status:  Stable   PONV Controlled:  None   Airway Patency:  Patent     Post Op Vitals Reviewed: Yes    No anethesia notable event occurred.    Staff: Anesthesiologist           Last Filed PACU Vitals:  Vitals Value Taken Time   Temp 97.1 °F (36.2 °C) 11/14/24 0843   Pulse 72 11/14/24 0916   /60 11/14/24 0916   Resp 18 11/14/24 0916   SpO2 94 % 11/14/24 0916       Modified Randal:  Activity: 2 (11/14/2024  9:16 AM)  Respiration: 2 (11/14/2024  9:16 AM)  Circulation: 2 (11/14/2024  9:16 AM)  Consciousness: 2 (11/14/2024  9:16 AM)  Oxygen Saturation: 2 (11/14/2024  9:16 AM)  Modified Randal Score: 10 (11/14/2024  9:16 AM)

## 2024-11-14 NOTE — OP NOTE
OPERATIVE REPORT  PATIENT NAME: Angela Martin    :  1985  MRN: [id number]  Pt Location: [facility] OR ROOM 01    SURGERY DATE: 2024    Surgeons and Role:     * Bret Azul MD - Primary     * Catrachito Hernandez PA-C - Assisting    Preop Diagnosis:  Adhesive capsulitis of right shoulder [M75.01]    Post-Op Diagnosis Codes:     * Adhesive capsulitis of right shoulder [M75.01]    Procedure(s):  Right - ARTHROSCOPY SHOULDER with lysis of Adhesions  2. Right shoulder arthroscopic subacromial decompression and bursectomy  3. Right shoulder manipulation Under Anesthesia    Specimen(s):  * No specimens in log *    Estimated Blood Loss:   0 mL    Drains:  * No LDAs found *    Anesthesia Type:   General w/ Interscalene Block    Operative Indications:  Adhesive capsulitis of right shoulder [M75.01]  Angela is a pleasant 39 y.o. female who presented with right shoulder pain and stiffness with a diagnosis of adhesive capsulitis. Patient has tried and failed conservative treatments including but not limited to physical therapy, repeat corticosteroid injections, activity modification, and OTC analgesics. Due to ongoing nature of patients pain and limits in range of motion on exam we discussed surgical intervention in the form of lysis of adhesions and manipulation under anesthesia. Risks and benefits discussed, pre and post operative expectations reviewed.  After full discussion she would like to pursue operative intervention.  Consent was signed in clinic previously. Physical therapy will start the day after surgery.         Given that the patient has failed conservative treatment and continues to have severe pain and/or dysfunction that limits their activities of daily living, they would like to proceed with operative intervention.  We discussed risks, benefits and alternatives to surgery today in the clinic. We discussed with the patient the risks of no treatment, non-operative treatment, and operative treatment.  The risks of operative intervention were discussed and include but are not limited to: Infection, bleeding, stiffness, loss of range of motion, blood clot, failure of surgery, fracture, delayed union, nonunion, malunion, risk of potential future arthritis, continued problems with swelling, injury to surrounding structures/nerve/artery/vein, recurrence of cysts, failure of hardware, retained hardware and/or foreign body, symptomatic hardware, and continued instability, pain, dysfunction, or disability despite repair.      Specifically, for            ODELL, ALIYAH Shoulder:  Fracture, Arthrofibrosis and loss of motion needing further treatment, Continued pain/CPRS    Operative Findings:  Thickened glenohumeral capsule as well as MGHL/SGHL/IGHL.  Moderate to severe subacromial bursitis present    Preoperative range of motion:  Forward elevation 90 degrees  External rotation at 0 30 degrees  External rotation at 90 degrees to 70 degrees  Internal rotation in 90 degrees to -10 degrees    Postoperative range of motion:  Forward elevation 160 degrees  External rotation at 0 to 90 degrees  External rotation at 90 degrees to 100 degrees  Internal rotation at 90 degrees to 60 degrees    Complications:   None    Procedure and Technique:  The patient was greeted in the preoperative holding area, where history, physical exam, review of surgical plan, and operative site doug were performed. The patient was transferred to the operative suite, placed supine on the operative table.  A preoperative interscalene nerve block was given general endotracheal anesthesia was induced, and perioperative intravenous antibiotics were administered.  The patient was safely repositioned to the beach-chair position with cervical spine in neutral position. All bony prominences were well padded. SCDs were placed on bilateral lower extremities.      Exam under anesthesia was then performed with preoperative findings stated above.     The operative  shoulder was prepped and draped in usual sterile fashion. A time out was performed with the participation of the entire operative and anesthesia team. Standard posterior glenohumeral and anterior rotator interval portals were established, through which the scope and probe were inserted respectively, which aided and assisted in performance of a complete and thorough diagnostic arthroscopy, which demonstrated the following findings:    Operative Findings:  Humeral head: Normal  Glenoid: Mild fraying anteriorly of the cartilage  Biceps tendon and biceps anchor: Normal  Rotator Cuff: No tears  Glenoid Labrum normal  Subacromial moderate severe bursitis present  Second anterior inferior and posterior capsule.  Thickened MGHL, SGHL, and IGHL    After diagnostic arthroscopy we then performed a lysis of adhesions.  Using radiofrequency ablator and arthroscopic biter the [facility], SG HL, anterior and inferior capsule where incised and debrided starting anteriorly.  Care was taken to only take down the capsule and no structures above and below it.  We then switched the arthroscope from posterior to anterior and lysis of adhesions was performed posteriorly and posterior inferiorly dividing the capsule.  This gave good release of the capsule circumferentially.     I then placed the arthroscope into the subacromial space. After localization with a spinal needle, I made a lateral incision to establish a lateral portal.  I performed an extensive bursectomy , removed the soft tissue from the undersurface of the acromion. There no prominent subacromial spur and adequate room for the rotator cuff without signs of impingement.      At this point there is no further pathology in the shoulder.  Arthroscope was removed from the shoulder.  We then moved forward with our manipulation under anesthesia.  This was carefully done in forward elevation, abduction, external rotation and internal rotation.  She was able to regain full range of motion  following manipulation.     Postoperative range of motion:  Forward elevation 160 degrees  External rotation at 0 to 90 degrees  External rotation at 90 degrees to 100 degrees  Internal rotation at 90 degrees to 60 degrees     The shoulder was copiously irrigated and drained. The arthroscopic wounds were closed with 3-0 Nylon sutures. A sterile dressing and shoulder immobilizer were placed on the operative extremity.  The patient emerged from anesthesia and was taken to the recovery room in stable condition.  There were no apparent complications.     I was present for the entire procedure., A qualified resident physician was not available., and A physician assistant was required during the procedure for retraction, tissue handling, dissection and suturing.    Patient Disposition:  PACU              SIGNATURE: Bret Azul MD  DATE: November 14, 2024  TIME: 8:57 AM    She will be in a sling for comfort for the next 3 days while nerve block is intact.  She may wean out of the sling after the nerve block is completed.  She will start physical therapy for range of motion training on postop day 1 and continue for the first 2 weeks daily if possible.  Will see her back in 2 weeks for suture removal and range of motion check.

## 2024-11-14 NOTE — PERIOPERATIVE NURSING NOTE
Patient received from PACU, alert and awake.  PO fluids offered. Vitals stable, no c/o pain a this time. Neurovascular assessment on right arm is intact and WDL. Sling is on . IV line is running.  Call bell within reach. Plan of care in progress.

## 2024-11-14 NOTE — ANESTHESIA PROCEDURE NOTES
Peripheral Block    Patient location during procedure: holding area  Start time: 11/14/2024 7:25 AM  Reason for block: procedure for pain, at surgeon's request and post-op pain management  Staffing  Performed by: Timi Ley MD  Authorized by: Timi Ley MD    Preanesthetic Checklist  Completed: patient identified, IV checked, site marked, risks and benefits discussed, surgical consent, monitors and equipment checked, pre-op evaluation and timeout performed  Peripheral Block  Patient position: supine  Prep: ChloraPrep  Patient monitoring: continuous pulse oximetry, frequent blood pressure checks and heart rate  Block type: Interscalene  Laterality: right  Injection technique: single-shot  Procedures: ultrasound guided, Ultrasound guidance required for the procedure to increase accuracy and safety of medication placement and decrease risk of complications.  Ultrasound permanent image saved  bupivacaine (PF) (MARCAINE) 0.5 % injection 20 mL - Perineural   5 mL - 11/14/2024 7:25:00 AM  bupivacaine liposomal (EXPAREL) 1.3 % injection 20 mL - Perineural   20 mL - 11/14/2024 7:25:00 AM  Needle  Needle type: Stimuplex   Needle gauge: 22 G  Needle length: 4 in  Needle localization: ultrasound guidance  Assessment  Injection assessment: frequent aspiration, incremental injection, injected with ease, needle tip visualized at all times, negative aspiration, negative for heart rate change, no paresthesia on injection and no symptoms of intraneural/intravenous injection  Paresthesia pain: none  Post-procedure:  site cleaned  patient tolerated the procedure well with no immediate complications

## 2024-11-14 NOTE — PERIOPERATIVE NURSING NOTE
AVS reviewed with patient and his  at bed side and all concerns were answered. IV line is taken out. Patient tolerated PO fluids well. Denies any pain at this time. Surgical site dressing is clean, dry and intact. Neurovascular assessment to Right arm is intact and WDL. All belongings were sent with patient. Patient left floor with firm understanding of discharge instructions. Patient escorted to her ride via wheelchair by this nurse.

## 2024-11-14 NOTE — DISCHARGE INSTR - AVS FIRST PAGE
POSTOPERATIVE INSTRUCTIONS following SHOULDER SURGERY      MEDICATIONS:  Resume all home medications unless otherwise instructed by your surgeon.  Pain Medication:   Take Tylenol 1000mg three times a day and Naproxen 500mg twice daily on prescribed schedule for 7 days  Take 5mg Oxycodone as needed every 4-6 hours for severe pain   If you were given a regional anesthetic (nerve block), it is helpful to take your pain medication before the block wear off.    Possible side effects include nausea, constipation, and urinary retention.  If you experience these side effects, please call our office for assistance.  Pain med refills are authorized only during office hours (8am-4pm, Mon-Fri).  Nausea Medication:   Zofran 4mg, take 1 tablet every 6 hours as needed for nausea or vomiting   Fill prescription ONLY if you expericnce severe nausea.  Blood Clot Prevention:   Pump your foot up and down 20 times per hour while you are less mobile.  Ambulate with your crutches at least once every hour   Take Aspirin 81mg Twice daily for 4 weeks    WOUND CARE:  Keep the dressing clean and dry.  Light drainage may occur the first 2 days postop.  You may remove the dressings and get the incision wet in the shower 72 hours after surgery.  DO NOT remove steri-strips or sutures.  DO NOT immerse the incision under water.  Carefully pat the incision dry.  If there is wound drainage, re-apply a fresh dry gauze dressing.  Please call our office ([phone]) if you experience either of the following:  Sudden increase in swelling, redness, or warmth at the surgical site  Excessive incisional drainage that persists beyond the 3rd day after surgery  Oral temperature greater than 101 degrees, not relieved with Tylenol  Shortness of breath, chest pain, nausea, or any other concerning symptoms  If it is after hours and you cannot reach someone, go to the nearest emergency room    ICE:  You may use Ice to help with pain control   Place ice on the  operative site for 20 minutes at a time when you are in pain     SLING:  Wear your sling ONLY AS NEEDED FOR COMFORT.- You may remove this once your block wears off    ACTIVITY:   DO NOT lift, carry, push, or pull anything with your operative arm.  You are allowed FULL RANGE OF MOTION as tolerated.  Place a pillow behind the elbow while lying down.  Sleeping in a more upright position (recliner) may be more comfortable initially.  Elbow, Wrist, and Finger Motion:  You may take your elbow out of the slight carefully to move your elbow, wrist, and fingers. Follow your motion precautions for the shoulder. Replace the sling immediately after.     PHYSICAL THERAPY:  Begin therapy on THE DAY AFTER SURGERY.  You were given a prescription for therapy at your preoperative office visit.  If you do not have physical therapy scheduled yet, please call our office for assistance.    FOLLOW-UP APPOINTMENT:  2 weeks after surgery with:      Bret Azul MD    [facility], [address], [address]  [place] [address]    [facility] [id number] [place] [address]      Phone:   [phone]

## 2024-11-14 NOTE — PERIOPERATIVE NURSING NOTE
"Crying and emotional upon arrival to pacu. Upset because right arm is numb and unable to \"straighten it out\".    Reasurred about the block. And how effective it is regarding pain control    "

## 2024-11-15 ENCOUNTER — OFFICE VISIT (OUTPATIENT)
Dept: PHYSICAL THERAPY | Facility: CLINIC | Age: 39
End: 2024-11-15
Payer: COMMERCIAL

## 2024-11-15 DIAGNOSIS — M75.01 ADHESIVE CAPSULITIS OF RIGHT SHOULDER: Primary | ICD-10-CM

## 2024-11-15 PROCEDURE — 97164 PT RE-EVAL EST PLAN CARE: CPT | Performed by: PHYSICAL THERAPIST

## 2024-11-15 PROCEDURE — 97530 THERAPEUTIC ACTIVITIES: CPT | Performed by: PHYSICAL THERAPIST

## 2024-11-15 PROCEDURE — 97110 THERAPEUTIC EXERCISES: CPT | Performed by: PHYSICAL THERAPIST

## 2024-11-15 NOTE — PROGRESS NOTES
PT Evaluation     Today's date: 11/15/2024  Patient name: Angela Martin  : 1985  MRN: [id number]  Referring provider: Catrachito Hernandez PA-C  Dx:   Encounter Diagnosis     ICD-10-CM    1. Adhesive capsulitis of right shoulder  M75.01           Start Time: 1015  Stop Time: 1100  Total time in clinic (min): 45 minutes    Assessment  Impairments: abnormal muscle firing, abnormal muscle tone, abnormal or restricted ROM, abnormal movement, impaired physical strength, lacks appropriate home exercise program, pain with function and poor posture   Symptom irritability: low    Assessment details: Angela Martin is a 39 y.o. female who presents with complaints of Adhesive capsulitis of right shoulder following ODELL. No further referral appears necessary at this time based upon examination results.  Patient is presenting with good PROM with limited AROM leading to limitations with using RUE for all tasks. Prognosis is good given HEP.  Positive prognostic indicators include positive attitude toward recovery.   Please contact me if you have any questions or recommendations.  Thank you for the opportunity to share in [facility] care.       Understanding of Dx/Px/POC: good     Prognosis: good    Plan  Patient would benefit from: skilled physical therapy    Planned therapy interventions: joint mobilization, manual therapy, patient education, postural training, strengthening, stretching, therapeutic activities, therapeutic exercise, home exercise program, neuromuscular re-education, flexibility and functional ROM exercises    Frequency: 5x week  Duration in weeks: 8  Treatment plan discussed with: patient        Subjective Evaluation    History of Present Illness  Mechanism of injury: Patient reports history of shoulder pain not improved with conservative measures and presenting 1 day after lysis and ODELL for Thickened glenohumeral capsule as well as MGHL/SGHL/IGHL.  Moderate to severe subacromial bursitis present. Patient to  perform therapy 3-5 days/week for 6 weeks. Sleep disturbance reported as well. Patient is right hand dominant. Trouble with dressing reported. Fingers and thumb are tingling/numb at this time.           Recurrent probem    Quality of life: good    Patient Goals  Patient goals for therapy: decreased pain, increased motion, increased strength and independence with ADLs/IADLs    Pain  Current pain ratin  At worst pain ratin  Aggravating factors: overhead activity    Treatments  Previous treatment: immobilization, physical therapy and injection treatment    Short Term:  Pt will report decreased levels of pain by at least 2 subjective ratings in 4 weeks  Pt will demonstrate improved ROM by at least 10 degrees in 4 weeks  Pt will demonstrate improved strength by 1/2 grade in 4 weeks.  Pt will be able to reach to shoulder height in 4 weeks  Long Term:   Pt will be independent in their HEP in 8 weeks  Pt will be pain free with IADL's in 8 weeks.  Pt will display full shoulder AROM in 8 weeks.  Pt will be able to reach to overhead cabinet in 8 weeks     Objective             MMT         AROM          PROM    Shoulder       R       L        R          L        R        L   Flex. N/t 5 N/t  175   Extn. N/t 5 N/t WNL 20 55   Abd. N/t 5 N/t  165   Add. N/t 5 N/t      IR. N/t 5 N/t WNL 50 60   ER. N/t 5 N/t WNL 90 100   Behind back IR   N/t T6 Greater troch                 MMT    Elbow       R       L   Flex. N/t 5   Extn. N/t 5              MMT    Wrist       R         L   Flex. 3 5   Extn. 3 5    bent 3 5     Posture: depressed right shoulder girdle       Insurance:  AMA/CMS Eval/ Re-eval POC expires Auth #/ Referral # Total    Start date  Expiration date Extension  Visit limitation?  PT only or  PT+OT? Co-Insurance   AMA 11.15.24 1.10.24                        Precautions: standard, ODELL 24  Patient provided verbal consent to treatment plan and recommended interventions.    Postoperative range of  motion:  Forward elevation 160 degrees  External rotation at 0 to 90 degrees  External rotation at 90 degrees to 100 degrees  Internal rotation at 90 degrees to 60 degrees    Access Code: [email or web address]  URL: [email or web address]  Date: 11/15/2024  Prepared by: Vega Leung    Exercises  - Supine Shoulder Flexion Extension AAROM with Dowel  - 4 x daily - 7 x weekly - 2 sets - 10 reps  - Supine Shoulder External Rotation with Dowel  - 4 x daily - 7 x weekly - 2 sets - 10 reps  - Standing Shoulder Abduction AAROM with Dowel  - 4 x daily - 7 x weekly - 2 sets - 10 reps  - Standing Shoulder Extension AAROM with Dowel  - 4 x daily - 7 x weekly - 2 sets - 10 reps    Manuals 11.15        visit 1                                   Neuro Re-Ed                                                                        Ther Ex         PROM FB        Cane OH AAROM flexion 2*10        Supine 90/90 ER AAROM 2*10        pulleys         Shld abduction AAROM 2*10        Shld extn AAROM 2*10                          Ther Activity         Pt ed FB

## 2024-11-18 ENCOUNTER — OFFICE VISIT (OUTPATIENT)
Dept: PHYSICAL THERAPY | Facility: CLINIC | Age: 39
End: 2024-11-18
Payer: COMMERCIAL

## 2024-11-18 DIAGNOSIS — M75.01 ADHESIVE CAPSULITIS OF RIGHT SHOULDER: Primary | ICD-10-CM

## 2024-11-18 PROCEDURE — 97110 THERAPEUTIC EXERCISES: CPT | Performed by: PHYSICAL THERAPIST

## 2024-11-18 NOTE — PROGRESS NOTES
Daily Note     Today's date: 2024  Patient name: Angela Martin  : 1985  MRN: [id number]  Referring provider: Catrachito Hernandez PA-C  Dx:   Encounter Diagnosis     ICD-10-CM    1. Adhesive capsulitis of right shoulder  M75.01                    Subjective: Patient reports fair adherence to HEP. Took shower today.     Objective: See treatment diary below    Assessment: Tolerated treatment well. Patient  demonstrated fair to good PROM at this time. Cueing for proper exercise performance at home.     Plan: Continue per plan of care.        Insurance:  AMA/CMS Eval/ Re-eval POC expires Auth #/ Referral # Total    Start date  Expiration date Extension  Visit limitation?  PT only or  PT+OT? Co-Insurance   AMA 11.15.24 1.10.24                        Precautions: standard, ODELL 24  Patient provided verbal consent to treatment plan and recommended interventions.    Postoperative range of motion:  Forward elevation 160 degrees  External rotation at 0 to 90 degrees  External rotation at 90 degrees to 100 degrees  Internal rotation at 90 degrees to 60 degrees    Access Code: [email or web address]  URL: [email or web address]  Date: 11/15/2024  Prepared by: Vega Leung    Exercises  - Supine Shoulder Flexion Extension AAROM with Dowel  - 4 x daily - 7 x weekly - 2 sets - 10 reps  - Supine Shoulder External Rotation with Dowel  - 4 x daily - 7 x weekly - 2 sets - 10 reps  - Standing Shoulder Abduction AAROM with Dowel  - 4 x daily - 7 x weekly - 2 sets - 10 reps  - Standing Shoulder Extension AAROM with Dowel  - 4 x daily - 7 x weekly - 2 sets - 10 reps    Manuals 11.15 11.18       visit 1 2                                  Neuro Re-Ed                                    Ther Ex         PROM FB FB       Cane OH AAROM flexion 2*10 2*10 supine and stand ea.       Supine 90/90 ER AAROM 2*10 2*10       pulleys         Shld abduction AAROM 2*10 2*10       Shld extn AAROM 2*10 2*10                                            Ther Activity         Pt ed FB

## 2024-11-19 ENCOUNTER — OFFICE VISIT (OUTPATIENT)
Dept: PHYSICAL THERAPY | Facility: CLINIC | Age: 39
End: 2024-11-19
Payer: COMMERCIAL

## 2024-11-19 DIAGNOSIS — M75.01 ADHESIVE CAPSULITIS OF RIGHT SHOULDER: Primary | ICD-10-CM

## 2024-11-19 PROCEDURE — 97110 THERAPEUTIC EXERCISES: CPT | Performed by: PHYSICAL THERAPIST

## 2024-11-19 NOTE — PROGRESS NOTES
Daily Note     Today's date: 2024  Patient name: Angela Martin  : 1985  MRN: [id number]  Referring provider: Catrachito Hernandez PA-C  Dx:   Encounter Diagnosis     ICD-10-CM    1. Adhesive capsulitis of right shoulder  M75.01                    Subjective: Patient reports shoulder is sore entering treatment.     Objective: See treatment diary below    Assessment: Tolerated treatment well. Patient demonstrated improved ER PROM with treatment and better AAROM flexion.     Plan: Continue per plan of care.        Insurance:  AMA/CMS Eval/ Re-eval POC expires Auth #/ Referral # Total    Start date  Expiration date Extension  Visit limitation?  PT only or  PT+OT? Co-Insurance   AMA 11.15.24 1.10.24                        Precautions: standard, ODELL 24  Patient provided verbal consent to treatment plan and recommended interventions.    Postoperative range of motion:  Forward elevation 160 degrees  External rotation at 0 to 90 degrees  External rotation at 90 degrees to 100 degrees  Internal rotation at 90 degrees to 60 degrees    Access Code: [email or web address]  URL: [email or web address]  Date: 11/15/2024  Prepared by: Vega Leung    Exercises  - Supine Shoulder Flexion Extension AAROM with Dowel  - 4 x daily - 7 x weekly - 2 sets - 10 reps  - Supine Shoulder External Rotation with Dowel  - 4 x daily - 7 x weekly - 2 sets - 10 reps  - Standing Shoulder Abduction AAROM with Dowel  - 4 x daily - 7 x weekly - 2 sets - 10 reps  - Standing Shoulder Extension AAROM with Dowel  - 4 x daily - 7 x weekly - 2 sets - 10 reps    Manuals 11.15 11.18 11.19      visit 1 2 3      Scap mobs   FB                        Neuro Re-Ed                                    Ther Ex         PROM FB FB FB      Cane OH AAROM flexion 2*10 2*10 supine and stand ea. 2*10 supine and stand ea.      Supine 90/90 ER AAROM 2*10 2*10 2*10      pulleys   2*10 flexion      Shld abduction AAROM 2*10 2*10 2*10      Shld extn AAROM 2*10 2*10  2*10      Supine punch   2*10 PT assist                                 Ther Activity         Pt ed FB

## 2024-11-20 ENCOUNTER — OFFICE VISIT (OUTPATIENT)
Dept: PHYSICAL THERAPY | Facility: CLINIC | Age: 39
End: 2024-11-20
Payer: COMMERCIAL

## 2024-11-20 DIAGNOSIS — M75.01 ADHESIVE CAPSULITIS OF RIGHT SHOULDER: Primary | ICD-10-CM

## 2024-11-20 PROCEDURE — 97110 THERAPEUTIC EXERCISES: CPT

## 2024-11-20 NOTE — PROGRESS NOTES
Daily Note     Today's date: 2024  Patient name: Angela Martin  : 1985  MRN: [id number]  Referring provider: Catrachito Hernandez PA-C  Dx:   Encounter Diagnosis     ICD-10-CM    1. Adhesive capsulitis of right shoulder  M75.01                    Subjective: Patient reports that her shoulder is really sore today because she didn't get around to doing the exercises until later in the morning.       Objective: See treatment diary below      Assessment: Tolerated treatment well. Continued with established POC per primary PT emphasizing passive and active assisted motion in all directions. Patient demonstrates good motion in all planes, but continued pain throughout. Will continue to advance as tolerated by primary PT.       Plan: Continue per plan of care.        Insurance:  A/CMS Eval/ Re-eval POC expires Auth #/ Referral # Total    Start date  Expiration date Extension  Visit limitation?  PT only or  PT+OT? Co-Insurance   AMA 11.15.24 1.10.24                        Precautions: standard, ODELL 24  Patient provided verbal consent to treatment plan and recommended interventions.    Postoperative range of motion:  Forward elevation 160 degrees  External rotation at 0 to 90 degrees  External rotation at 90 degrees to 100 degrees  Internal rotation at 90 degrees to 60 degrees    Access Code: [email or web address]  URL: [email or web address]  Date: 11/15/2024  Prepared by: Vega Leung    Exercises  - Supine Shoulder Flexion Extension AAROM with Dowel  - 4 x daily - 7 x weekly - 2 sets - 10 reps  - Supine Shoulder External Rotation with Dowel  - 4 x daily - 7 x weekly - 2 sets - 10 reps  - Standing Shoulder Abduction AAROM with Dowel  - 4 x daily - 7 x weekly - 2 sets - 10 reps  - Standing Shoulder Extension AAROM with Dowel  - 4 x daily - 7 x weekly - 2 sets - 10 reps    Manuals 11.15 11.18 11.19 24     visit 1 2 3 4     Scap mobs   FB                        Neuro Re-Ed                                     Ther Ex         PROM FB FB FB      Cane OH AAROM flexion 2*10 2*10 supine and stand ea. 2*10 supine and stand ea. 2*10 supine and stand ea.     Supine 90/90 ER AAROM 2*10 2*10 2*10 2*10     pulleys   2*10 flexion 2*10 flexion    2*10 abduction     Shld abduction AAROM 2*10 2*10 2*10 2*10     Shld extn AAROM 2*10 2*10 2*10 2*10     Supine punch   2*10 PT assist 2*10                                 Ther Activity         Pt ed FB

## 2024-11-21 ENCOUNTER — OFFICE VISIT (OUTPATIENT)
Dept: PHYSICAL THERAPY | Facility: CLINIC | Age: 39
End: 2024-11-21
Payer: COMMERCIAL

## 2024-11-21 ENCOUNTER — HOSPITAL ENCOUNTER (OUTPATIENT)
Dept: RADIOLOGY | Facility: HOSPITAL | Age: 39
Discharge: HOME | End: 2024-11-21
Payer: COMMERCIAL

## 2024-11-21 ENCOUNTER — RESULTS FOLLOW-UP (OUTPATIENT)
Dept: FAMILY MEDICINE CLINIC | Facility: CLINIC | Age: 39
End: 2024-11-21

## 2024-11-21 VITALS — WEIGHT: 145 LBS | HEIGHT: 62 IN | BODY MASS INDEX: 26.68 KG/M2

## 2024-11-21 DIAGNOSIS — R92.8 ABNORMAL MAMMOGRAM: ICD-10-CM

## 2024-11-21 DIAGNOSIS — M75.01 ADHESIVE CAPSULITIS OF RIGHT SHOULDER: Primary | ICD-10-CM

## 2024-11-21 PROBLEM — R92.30 DENSE BREASTS: Status: ACTIVE | Noted: 2024-11-21

## 2024-11-21 PROCEDURE — 77065 DX MAMMO INCL CAD UNI: CPT

## 2024-11-21 PROCEDURE — 76642 ULTRASOUND BREAST LIMITED: CPT

## 2024-11-21 PROCEDURE — 97110 THERAPEUTIC EXERCISES: CPT | Performed by: PHYSICAL THERAPIST

## 2024-11-21 PROCEDURE — G0279 TOMOSYNTHESIS, MAMMO: HCPCS

## 2024-11-21 NOTE — PROGRESS NOTES
Daily Note     Today's date: 2024  Patient name: Angela Martin  : 1985  MRN: [id number]  Referring provider: Catrachito Hernandez PA-C  Dx:   Encounter Diagnosis     ICD-10-CM    1. Adhesive capsulitis of right shoulder  M75.01                    Subjective: Patient reports doing okay entering treatment. Not taking prescription medication at this time.     Objective: See treatment diary below    Assessment: Tolerated treatment well.  Patient demonstrates significant improvement in AROM and passive ROM since 2 days ago when I worked with her. Improved scap mobility noted as well with less tenderness.    Plan: Continue per plan of care.        Insurance:  [employer]/CMS Eval/ Re-eval POC expires Auth #/ Referral # Total    Start date  Expiration date Extension  Visit limitation?  PT only or  PT+OT? Co-Insurance   AMA 11.15.24 1.10.24  50V                      Precautions: standard, ODELL 24  Patient provided verbal consent to treatment plan and recommended interventions.    Postoperative range of motion:  Forward elevation 160 degrees  External rotation at 0 to 90 degrees  External rotation at 90 degrees to 100 degrees  Internal rotation at 90 degrees to 60 degrees    Access Code: [email or web address]  URL: [email or web address]  Date: 11/15/2024  Prepared by: Vega Leung    Exercises  - Supine Shoulder Flexion Extension AAROM with Dowel  - 4 x daily - 7 x weekly - 2 sets - 10 reps  - Supine Shoulder External Rotation with Dowel  - 4 x daily - 7 x weekly - 2 sets - 10 reps  - Standing Shoulder Abduction AAROM with Dowel  - 4 x daily - 7 x weekly - 2 sets - 10 reps  - Standing Shoulder Extension AAROM with Dowel  - 4 x daily - 7 x weekly - 2 sets - 10 reps    Manuals 11.15 11.18 11.19 24    visit 1 2 3 4 5    Scap mobs   FB  FB                      Neuro Re-Ed                                    Ther Ex         PROM FB FB FB  FB    Cane OH AAROM flexion 2*10 2*10 supine and stand ea. 2*10 supine and  stand ea. 2*10 supine and stand ea. 2*10 supine and stand ea.    Supine 90/90 ER AAROM 2*10 2*10 2*10 2*10 2*10    pulleys   2*10 flexion 2*10 flexion    2*10 abduction 2*10 flexion    Shld abduction AAROM 2*10 2*10 2*10 2*10 2*10    Shld extn AAROM 2*10 2*10 2*10 2*10 2*10    Supine punch   2*10 PT assist 2*10      Behind back IR     2*10                      Ther Activity         Pt ed FB                       Tremfya Counseling: I discussed with the patient the risks of guselkumab including but not limited to immunosuppression, serious infections, and drug reactions.  The patient understands that monitoring is required including a PPD at baseline and must alert us or the primary physician if symptoms of infection or other concerning signs are noted.

## 2024-11-22 ENCOUNTER — OFFICE VISIT (OUTPATIENT)
Dept: PHYSICAL THERAPY | Facility: CLINIC | Age: 39
End: 2024-11-22
Payer: COMMERCIAL

## 2024-11-22 DIAGNOSIS — M75.01 ADHESIVE CAPSULITIS OF RIGHT SHOULDER: Primary | ICD-10-CM

## 2024-11-22 PROCEDURE — 97110 THERAPEUTIC EXERCISES: CPT | Performed by: PHYSICAL THERAPIST

## 2024-11-22 NOTE — PROGRESS NOTES
Daily Note     Today's date: 2024  Patient name: Angela Martin  : 1985  MRN: [id number]  Referring provider: Catrachito Hernandez PA-C  Dx:   Encounter Diagnosis     ICD-10-CM    1. Adhesive capsulitis of right shoulder  M75.01                    Subjective: Patient reports doing okay entering treatment, good HEP compliance reported and good ROM reported. Soreness/discomfort when trying to do the dishes.     Objective: See treatment diary below    Assessment: Tolerated treatment well. Improved scapular mobility and normal PROM ER at this time. Less ache reported with manual and self ROM. Still limited with behind back IR but improving.     Plan: Continue per plan of care.        Insurance:  AMA/CMS Eval/ Re-eval POC expires Auth #/ Referral # Total    Start date  Expiration date Extension  Visit limitation?  PT only or  PT+OT? Co-Insurance   AMA 11.15.24 1.10.24  50V                      Precautions: standard, ODELL 24  Patient provided verbal consent to treatment plan and recommended interventions.    Postoperative range of motion:  Forward elevation 160 degrees  External rotation at 0 to 90 degrees  External rotation at 90 degrees to 100 degrees  Internal rotation at 90 degrees to 60 degrees    Access Code: [email or web address]  URL: [email or web address]  Date: 11/15/2024  Prepared by: Vega Leung    Exercises  - Supine Shoulder Flexion Extension AAROM with Dowel  - 4 x daily - 7 x weekly - 2 sets - 10 reps  - Supine Shoulder External Rotation with Dowel  - 4 x daily - 7 x weekly - 2 sets - 10 reps  - Standing Shoulder Abduction AAROM with Dowel  - 4 x daily - 7 x weekly - 2 sets - 10 reps  - Standing Shoulder Extension AAROM with Dowel  - 4 x daily - 7 x weekly - 2 sets - 10 reps    Manuals 11.15 11.18 11.19 24   visit 1 2 3 4 5 6   Scap mobs   FB  FB FB                     Neuro Re-Ed                                    Ther Ex         PROM FB FB FB  FB FB   Cane OH AAROM  flexion 2*10 2*10 supine and stand ea. 2*10 supine and stand ea. 2*10 supine and stand ea. 2*10 supine and stand ea. 2*10 supine and stand ea.   Supine 90/90 ER AAROM 2*10 2*10 2*10 2*10 2*10 2*10   pulleys   2*10 flexion 2*10 flexion  2*10 abduction 2*10 flexion 2*10 flexion   Shld abduction AAROM 2*10 2*10 2*10 2*10 2*10 2*10   Shld extn AAROM 2*10 2*10 2*10 2*10 2*10 2*10   Supine punch   2*10 PT assist 2*10   2*10 PT s/l   Behind back IR     2*10    Behind back horz. add      2*10 w/cane   Cross body add.      nv   Ther Activity         Pt ed FB

## 2024-11-25 ENCOUNTER — OFFICE VISIT (OUTPATIENT)
Dept: PHYSICAL THERAPY | Facility: CLINIC | Age: 39
End: 2024-11-25
Payer: COMMERCIAL

## 2024-11-25 DIAGNOSIS — M75.01 ADHESIVE CAPSULITIS OF RIGHT SHOULDER: Primary | ICD-10-CM

## 2024-11-25 PROCEDURE — 97110 THERAPEUTIC EXERCISES: CPT

## 2024-11-25 NOTE — PROGRESS NOTES
"Daily Note     Today's date: 2024  Patient name: Angela Martin  : 1985  MRN: [id number]  Referring provider: Catrachito Hrenandez PA-C  Dx:   Encounter Diagnosis     ICD-10-CM    1. Adhesive capsulitis of right shoulder  M75.01                      Subjective: Patient reports that the shoulder is mostly fine but certain movements hurt when she hits certain spots \"hot spots\"    Objective: See treatment diary below    Assessment: Tolerated treatment well. Performed PROM and AAROM. PT tolerated with noted deficit in functional IR. Introduced modified sleeper stretch today avoiding WB through R shoulder. Still limited with behind back functional IR but improving.  Pt to have follow up .     Plan: Continue per plan of care.        Insurance:  AMA/CMS Eval/ Re-eval POC expires Auth #/ Referral # Total    Start date  Expiration date Extension  Visit limitation?  PT only or  PT+OT? Co-Insurance   AMA 11.15.24 1.10.24  50V                      Precautions: standard, ODELL 24  Patient provided verbal consent to treatment plan and recommended interventions.    Postoperative range of motion:  Forward elevation 160 degrees  External rotation at 0 to 90 degrees  External rotation at 90 degrees to 100 degrees  Internal rotation at 90 degrees to 60 degrees    Access Code: [email or web address]  URL: [email or web address]  Date: 11/15/2024  Prepared by: Vega Leung    Exercises  - Supine Shoulder Flexion Extension AAROM with Dowel  - 4 x daily - 7 x weekly - 2 sets - 10 reps  - Supine Shoulder External Rotation with Dowel  - 4 x daily - 7 x weekly - 2 sets - 10 reps  - Standing Shoulder Abduction AAROM with Dowel  - 4 x daily - 7 x weekly - 2 sets - 10 reps  - Standing Shoulder Extension AAROM with Dowel  - 4 x daily - 7 x weekly - 2 sets - 10 reps    Manuals 11. 11.24   visit 2 3 4 5 6 7   Scap mobs  FB  FB FB BG                     Neuro Re-Ed                                    Ther " "Ex         PROM FB FB  FB FB    Cane OH AAROM flexion 2*10 supine and stand ea. 2*10 supine and stand ea. 2*10 supine and stand ea. 2*10 supine and stand ea. 2*10 supine and stand ea. Supine 2x10 AAROM flexion   Supine 90/90 ER AAROM 2*10 2*10 2*10 2*10 2*10 Supine 90/90 ER    pulleys  2*10 flexion 2*10 flexion  2*10 abduction 2*10 flexion 2*10 flexion    Shld abduction AAROM 2*10 2*10 2*10 2*10 2*10 2x10   Shld extn AAROM 2*10 2*10 2*10 2*10 2*10 2x10   Supine punch  2*10 PT assist 2*10   2*10 PT s/l 3x10   Behind back IR    2*10  10x10\"   Behind back horz. add     2*10 w/cane 2x10   Cross body add.     nv 2x10         Sleep stretch 2              Ther Activity         Pt ed                       "

## 2024-11-26 ENCOUNTER — OFFICE VISIT (OUTPATIENT)
Dept: PHYSICAL THERAPY | Facility: CLINIC | Age: 39
End: 2024-11-26
Payer: COMMERCIAL

## 2024-11-26 ENCOUNTER — OFFICE VISIT (OUTPATIENT)
Dept: OBGYN CLINIC | Facility: CLINIC | Age: 39
End: 2024-11-26

## 2024-11-26 VITALS
HEIGHT: 62 IN | SYSTOLIC BLOOD PRESSURE: 106 MMHG | BODY MASS INDEX: 26.68 KG/M2 | DIASTOLIC BLOOD PRESSURE: 73 MMHG | WEIGHT: 145 LBS | HEART RATE: 64 BPM

## 2024-11-26 DIAGNOSIS — Z09 S/P ORTHOPEDIC SURGERY, FOLLOW-UP EXAM: Primary | ICD-10-CM

## 2024-11-26 DIAGNOSIS — M75.01 ADHESIVE CAPSULITIS OF RIGHT SHOULDER: Primary | ICD-10-CM

## 2024-11-26 PROCEDURE — 97140 MANUAL THERAPY 1/> REGIONS: CPT | Performed by: PHYSICAL THERAPIST

## 2024-11-26 PROCEDURE — 99024 POSTOP FOLLOW-UP VISIT: CPT | Performed by: ORTHOPAEDIC SURGERY

## 2024-11-26 PROCEDURE — 97110 THERAPEUTIC EXERCISES: CPT | Performed by: PHYSICAL THERAPIST

## 2024-11-26 NOTE — PROGRESS NOTES
Daily Note     Today's date: 2024  Patient name: Angela Martin  : 1985  MRN: [id number]  Referring provider: Catrachito eHrnandez PA-C  Dx:   Encounter Diagnosis     ICD-10-CM    1. Adhesive capsulitis of right shoulder  M75.01                      Subjective: Patient reports that her ROM is improving and got a good report from her referring provider. Reports less irritation with simple ADLs below shoulder height.     Objective: See treatment diary below    Assessment: Tolerated treatment well. Near full PROM flexion and abduction at this time. Behind back IR limited but is steadily improving. Full ER noted at this time.     Plan: Add strengthening next visit to reinforce gains made during therapy.        Insurance:  AMA/CMS Eval/ Re-eval POC expires Auth #/ Referral # Total    Start date  Expiration date Extension  Visit limitation?  PT only or  PT+OT? Co-Insurance   AMA 11.15.24 1.10.24  50V                      Precautions: standard, ODELL 24  Patient provided verbal consent to treatment plan and recommended interventions.    Postoperative range of motion:  Forward elevation 160 degrees  External rotation at 0 to 90 degrees  External rotation at 90 degrees to 100 degrees  Internal rotation at 90 degrees to 60 degrees    Access Code: [email or web address]  URL: [email or web address]  Date: 11/15/2024  Prepared by: Vega Leung    Exercises  - Supine Shoulder Flexion Extension AAROM with Dowel  - 4 x daily - 7 x weekly - 2 sets - 10 reps  - Supine Shoulder External Rotation with Dowel  - 4 x daily - 7 x weekly - 2 sets - 10 reps  - Standing Shoulder Abduction AAROM with Dowel  - 4 x daily - 7 x weekly - 2 sets - 10 reps  - Standing Shoulder Extension AAROM with Dowel  - 4 x daily - 7 x weekly - 2 sets - 10 reps    Manuals 11.18 11.24   visit 2 3 4 5 6 7 8   Scap mobs  FB  FB FB BG FB                       Neuro Re-Ed                                        Ther Ex        "   PROM FB FB  FB FB  FB   Cane OH AAROM flexion 2*10 supine and stand ea. 2*10 supine and stand ea. 2*10 supine and stand ea. 2*10 supine and stand ea. 2*10 supine and stand ea. Supine 2x10 AAROM flexion Stand 2*10   Supine 90/90 ER AAROM 2*10 2*10 2*10 2*10 2*10 Supine 90/90 ER     pulleys  2*10 flexion 2*10 flexion  2*10 abduction 2*10 flexion 2*10 flexion  2*10 flexion   Shld abduction AAROM 2*10 2*10 2*10 2*10 2*10 2x10 2*10   Shld extn AAROM 2*10 2*10 2*10 2*10 2*10 2x10 2*10   Supine punch  2*10 PT assist 2*10   2*10 PT s/l 3x10 3*10   Behind back IR    2*10  10x10\" 10*10''   Behind back horz. add     2*10 w/cane 2x10 2*10   Cross body add.     nv 2x10 5*10''   Mod. Sleeper stretch      5*10''   5*10''             Ther Activity          Pt ed                         "

## 2024-11-26 NOTE — PROGRESS NOTES
Assessment/Plan:  1. S/P orthopedic surgery, follow-up exam          Scribe Attestation      I,:  Angela Foley am acting as a scribe while in the presence of the attending physician.:       I,:  Bret Azul MD personally performed the services described in this documentation    as scribed in my presence.:           Angela is a pleasant 39 y.o. female who presents for follow up evaluation almost 2 weeks S/P right shoulder arthroscopy with lysis of adhesions, subacromial decompression, and manipulation under anesthesia. Sutures removed in clinic, steri strips applied, patient may get wet, pat dry, do not scrub, do not soak. I am very pleased with her range of motion on clinical exam today. I encourage her to continue her efforts in physical therapy and at home.  She will plan to go back to work at this time.  We will see her back in clinic in 4 weeks for repeat evaluation and range of motion check.     Subjective:   Angela Martin is a 39 y.o. female who presents 2 weeks S/P right shoulder arthroscopy lysis of adhesions, subacromial decompression, and manipulation under anesthesia. Patient states she is doing very well today and pain is controlled. She has been attending physical therapy and notes improvements in range of motion. She denies fever, chills, or signs of infection. Denies numbness or paresthesias.       Review of Systems   Constitutional:  Negative for chills and fever.   HENT:  Negative for ear pain and sore throat.    Eyes:  Negative for pain and visual disturbance.   Respiratory:  Negative for cough and shortness of breath.    Cardiovascular:  Negative for chest pain and palpitations.   Gastrointestinal:  Negative for abdominal pain and vomiting.   Genitourinary:  Negative for dysuria and hematuria.   Musculoskeletal:  Negative for arthralgias and back pain.   Skin:  Negative for color change and rash.   Neurological:  Negative for seizures and syncope.   All other systems reviewed and are  negative.        Past Medical History:   Diagnosis Date    ADHD (attention deficit hyperactivity disorder)     Anemia     KVNG (generalized anxiety disorder)     History of transfusion 10/03/2008    Hemorrhage during child birth    Migraines     Miscarriage     , , ,        Past Surgical History:   Procedure Laterality Date     SECTION  12    DILATION AND CURETTAGE OF UTERUS      2005    KIDNEY SURGERY Right 2003    Hydronephorsis while pregnant (pardon the spelling)    [place] SURGICAL ARTHROSCOPY SHOULDER W/LSS&RESCJ ADS Right 2024    Procedure: ARTHROSCOPY SHOULDER, Lysis of Adhesions, subacromial decompression, Manipulation Under Anesthesia;  Surgeon: Bret Azul MD;  Location: [facility];  Service: Orthopedics    TUBAL LIGATION Bilateral        Family History   Problem Relation Age of Onset    COPD Mother     Endometriosis Mother     Diabetes Mother     Learning disabilities Mother     Neurological problems Mother     Asthma Mother     Heart disease Mother     Seizures Mother     No Known Problems Father     Learning disabilities Sister     Neurological problems Sister     No Known Problems Sister     No Known Problems Sister     No Known Problems Daughter        Social History     Occupational History    Not on file   Tobacco Use    Smoking status: Never     Passive exposure: Never    Smokeless tobacco: Never   Vaping Use    Vaping status: Never Used   Substance and Sexual Activity    Alcohol use: Yes     Alcohol/week: 1.0 standard drink of alcohol     Types: 1 Standard drinks or equivalent per week     Comment: Drink socially    Drug use: Never    Sexual activity: Yes     Partners: Male     Birth control/protection: Female Sterilization     Comment: 12 years          Current Outpatient Medications:     aspirin 81 mg chewable tablet, Chew 1 tablet (81 mg total) 2 (two) times a day for 28 days (Patient not taking: Reported on 2024), Disp: 56 tablet, Rfl: 0     ferrous sulfate 324 (65 Fe) mg, Take 1 tablet (324 mg total) by mouth 3 (three) times a week Monday, Wednesday, Friday (Patient not taking: Reported on 11/26/2024), Disp: 30 tablet, Rfl: 3    naproxen (Naprosyn) 500 mg tablet, Take 1 tablet (500 mg total) by mouth 2 (two) times a day with meals for 14 days (Patient not taking: Reported on 11/26/2024), Disp: 28 tablet, Rfl: 0    ondansetron (ZOFRAN) 4 mg tablet, Take 1 tablet (4 mg total) by mouth every 8 (eight) hours as needed for nausea or vomiting (Patient not taking: Reported on 11/26/2024), Disp: 20 tablet, Rfl: 0    oxyCODONE (Roxicodone) 5 immediate release tablet, Take 1 tablet (5 mg total) by mouth every 4 (four) hours as needed for moderate pain for up to 15 doses Max Daily Amount: 30 mg (Patient not taking: Reported on 11/26/2024), Disp: 15 tablet, Rfl: 0    Allergies   Allergen Reactions    Tetanus-Diphtheria Toxoids Td Swelling       Objective:  Vitals:    11/26/24 0810   BP: 106/73   Pulse: 64       Right Shoulder Exam     Range of Motion   External rotation:  80   Forward flexion:  140   Internal rotation 0 degrees:  L5     Other   Erythema: absent  Sensation: normal  Pulse: present    Comments:  With shoulder at 90 degrees of abduction ER 90, IR 50    Well healed incision  Clean, dry, and intact  No sign of infection  Skin is warm and dry to touch with no signs of erythema, ecchymosis, or infection  Demonstrates normal elbow, wrist, and finger motion  2+  distal radial pulse with brisk capillary refill to the fingers  Radial, median, ulnar and motor  and sensory distribution intact  Sensation to light touch intact distally              Physical Exam  Vitals and nursing note reviewed.   Constitutional:       Appearance: Normal appearance.   HENT:      Head: Normocephalic.      Right Ear: External ear normal.      Left Ear: External ear normal.   Eyes:      Extraocular Movements: Extraocular movements intact.      Conjunctiva/sclera: Conjunctivae  normal.   Cardiovascular:      Rate and Rhythm: Normal rate.      Pulses: Normal pulses.   Pulmonary:      Effort: Pulmonary effort is normal.      Breath sounds: Normal breath sounds.   Abdominal:      General: Abdomen is flat.   Musculoskeletal:         General: Normal range of motion.      Cervical back: Normal range of motion and neck supple.   Skin:     General: Skin is warm and dry.   Neurological:      General: No focal deficit present.      Mental Status: She is alert.   Psychiatric:         Mood and Affect: Mood normal.         Behavior: Behavior normal.         I have personally reviewed pertinent films in PACS and my interpretation is as follows:  No new images obtained today       This document was created using speech voice recognition software.   Grammatical errors, random word insertions, pronoun errors, and incomplete sentences are an occasional consequence of this system due to software limitations, ambient noise, and hardware issues.   Any formal questions or concerns about content, text, or information contained within the body of this dictation should be directly addressed to the provider for clarification.

## 2024-11-27 ENCOUNTER — APPOINTMENT (OUTPATIENT)
Dept: PHYSICAL THERAPY | Facility: CLINIC | Age: 39
End: 2024-11-27
Payer: COMMERCIAL

## 2024-11-29 ENCOUNTER — APPOINTMENT (OUTPATIENT)
Dept: PHYSICAL THERAPY | Facility: CLINIC | Age: 39
End: 2024-11-29
Payer: COMMERCIAL

## 2024-12-03 ENCOUNTER — OFFICE VISIT (OUTPATIENT)
Dept: PHYSICAL THERAPY | Facility: CLINIC | Age: 39
End: 2024-12-03
Payer: COMMERCIAL

## 2024-12-03 DIAGNOSIS — M75.01 ADHESIVE CAPSULITIS OF RIGHT SHOULDER: Primary | ICD-10-CM

## 2024-12-03 PROCEDURE — 97110 THERAPEUTIC EXERCISES: CPT | Performed by: PHYSICAL THERAPIST

## 2024-12-03 NOTE — PROGRESS NOTES
Daily Note         Today's date: 12/3/2024  Patient name: Angela Martin  : 1985  MRN: [id number]  Referring provider: Catrachito Hernandez PA-C  Dx:   Encounter Diagnosis     ICD-10-CM    1. Adhesive capsulitis of right shoulder  M75.01           Subjective: Anglea Martin reports no pain entering today , only with use it to lift items then its painful. States she feels she injured it this weekend carrying heavy items         Objective: See treatment diary below    Assessment:   PLOC discussed with primary PT. Advised to hold strengthening this session due to inc pain from weekend.  . Good tolerance to there ex, but poor tolerance to PROM right shoulder including scap mobs. Patient with painful, restricted shldr IR PROM.  Difficulty tolerating scap distraction  due to inc burning in anterior shoulder. Patient vocalized being frustrated because she knows her ROM was better last session.  Advised patient to ice as needed and have PT reassess next session.     Plan:  progress to strengthening next session if indicated by primary PT and patient soreness.           Insurance:  A/CMS Eval/ Re-eval POC expires Auth #/ Referral # Total    Start date  Expiration date Extension  Visit limitation?  PT only or  PT+OT? Co-Insurance   AMA 11.15.24 1.10.24  50V                      Precautions: standard, ODELL 11..24  Patient provided verbal consent to treatment plan and recommended interventions.    Postoperative range of motion:  Forward elevation 160 degrees  External rotation at 0 to 90 degrees  External rotation at 90 degrees to 100 degrees  Internal rotation at 90 degrees to 60 degrees    Access Code: [email or web address]  URL: [email or web address]  Date: 11/15/2024  Prepared by: Vega Leung    Exercises  - Supine Shoulder Flexion Extension AAROM with Dowel  - 4 x daily - 7 x weekly - 2 sets - 10 reps  - Supine Shoulder External Rotation with Dowel  - 4 x daily - 7 x weekly - 2 sets - 10 reps  - Standing  "Shoulder Abduction AAROM with Dowel  - 4 x daily - 7 x weekly - 2 sets - 10 reps  - Standing Shoulder Extension AAROM with Dowel  - 4 x daily - 7 x weekly - 2 sets - 10 reps    Manuals 11.18 11.19 11/20/24 11/21 11/22 11/25 11/26 12/3/24   visit 2 3 4 5 6 7 8 9   Scap mobs  FB  FB FB BG FB AD                         Neuro Re-Ed                                            Ther Ex           PROM FB FB  FB FB  FB AD   Cane OH AAROM flexion 2*10 supine and stand ea. 2*10 supine and stand ea. 2*10 supine and stand ea. 2*10 supine and stand ea. 2*10 supine and stand ea. Supine 2x10 AAROM flexion Stand 2*10    Supine 90/90 ER AAROM 2*10 2*10 2*10 2*10 2*10 Supine 90/90 ER      pulleys  2*10 flexion 2*10 flexion  2*10 abduction 2*10 flexion 2*10 flexion  2*10 flexion 10 x 2 flexion   Shld abduction AAROM 2*10 2*10 2*10 2*10 2*10 2x10 2*10    Shld extn AAROM 2*10 2*10 2*10 2*10 2*10 2x10 2*10    Supine punch  2*10 PT assist 2*10   2*10 PT s/l 3x10 3*10    Behind back IR    2*10  10x10\" 10*10'' W/ SOS: 10 sec x10   Behind back horz. add     2*10 w/cane 2x10 2*10 10 x 2   Cross body add.     nv 2x10 5*10''    Mod. Sleeper stretch      5*10''   5*10''               Ther Activity           Pt ed                              "

## 2024-12-04 NOTE — PRE-PROCEDURE INSTRUCTIONS
Pre-Surgery Instructions:   Medication Instructions    aspirin 81 mg chewable tablet Hold day of surgery.    naproxen (Naprosyn) 500 mg tablet Stop taking 7 days prior to surgery.    ondansetron (ZOFRAN) 4 mg tablet Hold day of surgery.    Medication instructions for day surgery reviewed. Please use only a sip of water to take your instructed medications. Avoid all over the counter vitamins, supplements and NSAIDS for one week prior to surgery per anesthesia guidelines. Tylenol is ok to take as needed.     You will receive a call one business day prior to surgery with an arrival time and hospital directions. If your surgery is scheduled on a Monday, the hospital will be calling you on the Friday prior to your surgery. If you have not heard from anyone by 8pm, please call the hospital supervisor through the hospital  at [phone]. ([place] [phone] or [place] [phone]).    Do not eat or drink anything after midnight the night before your surgery, including candy, mints, lifesavers, or chewing gum. Do not drink alcohol 24hrs before your surgery. Try not to smoke at least 24hrs before your surgery.       Follow the pre surgery showering instructions as listed in the “My Surgical Experience Booklet” or otherwise provided by your surgeon's office. Do not use a blade to shave the surgical area 1 week before surgery. It is okay to use a clean electric clippers up to 24 hours before surgery. Do not apply any lotions, creams, including makeup, cologne, deodorant, or perfumes after showering on the day of your surgery. Do not use dry shampoo, hair spray, hair gel, or any type of hair products.     No contact lenses, eye make-up, or artificial eyelashes. Remove nail polish, including gel polish, and any artificial, gel, or acrylic nails if possible. Remove all jewelry including rings and body piercing jewelry.     Wear causal clothing that is easy to take on and off. Consider your type of surgery.    Keep  any valuables, jewelry, piercings at home. Please bring any specially ordered equipment (sling, braces) if indicated.    Arrange for a responsible person to drive you to and from the hospital on the day of your surgery. Please confirm the visitor policy for the day of your procedure when you receive your phone call with an arrival time.     Call the surgeon's office with any new illnesses, exposures, or additional questions prior to surgery.    Please reference your “My Surgical Experience Booklet” for additional information to prepare for your upcoming surgery.

## 2024-12-05 ENCOUNTER — OFFICE VISIT (OUTPATIENT)
Dept: PHYSICAL THERAPY | Facility: CLINIC | Age: 39
End: 2024-12-05
Payer: COMMERCIAL

## 2024-12-05 ENCOUNTER — RESULTS FOLLOW-UP (OUTPATIENT)
Dept: OBGYN CLINIC | Facility: CLINIC | Age: 39
End: 2024-12-05

## 2024-12-05 ENCOUNTER — APPOINTMENT (OUTPATIENT)
Dept: LAB | Facility: HOSPITAL | Age: 39
End: 2024-12-05
Attending: OBSTETRICS & GYNECOLOGY
Payer: COMMERCIAL

## 2024-12-05 DIAGNOSIS — M75.01 ADHESIVE CAPSULITIS OF RIGHT SHOULDER: Primary | ICD-10-CM

## 2024-12-05 DIAGNOSIS — N92.0 MENORRHAGIA WITH REGULAR CYCLE: ICD-10-CM

## 2024-12-05 DIAGNOSIS — D64.9 ANEMIA, UNSPECIFIED TYPE: ICD-10-CM

## 2024-12-05 LAB
ALBUMIN SERPL BCG-MCNC: 4.2 G/DL (ref 3.5–5)
ALP SERPL-CCNC: 96 U/L (ref 34–104)
ALT SERPL W P-5'-P-CCNC: 45 U/L (ref 7–52)
ANION GAP SERPL CALCULATED.3IONS-SCNC: 6 MMOL/L (ref 4–13)
AST SERPL W P-5'-P-CCNC: 38 U/L (ref 13–39)
BASOPHILS # BLD AUTO: 0.07 THOUSANDS/ΜL (ref 0–0.1)
BASOPHILS NFR BLD AUTO: 1 % (ref 0–1)
BILIRUB SERPL-MCNC: 0.41 MG/DL (ref 0.2–1)
BUN SERPL-MCNC: 16 MG/DL (ref 5–25)
CALCIUM SERPL-MCNC: 8.8 MG/DL (ref 8.4–10.2)
CHLORIDE SERPL-SCNC: 106 MMOL/L (ref 96–108)
CO2 SERPL-SCNC: 26 MMOL/L (ref 21–32)
CREAT SERPL-MCNC: 0.7 MG/DL (ref 0.6–1.3)
EOSINOPHIL # BLD AUTO: 0.27 THOUSAND/ΜL (ref 0–0.61)
EOSINOPHIL NFR BLD AUTO: 4 % (ref 0–6)
ERYTHROCYTE [DISTWIDTH] IN BLOOD BY AUTOMATED COUNT: 13.7 % (ref 11.6–15.1)
EST. AVERAGE GLUCOSE BLD GHB EST-MCNC: 94 MG/DL
GFR SERPL CREATININE-BSD FRML MDRD: 108 ML/MIN/1.73SQ M
GLUCOSE P FAST SERPL-MCNC: 95 MG/DL (ref 65–99)
HBA1C MFR BLD: 4.9 %
HCT VFR BLD AUTO: 39.8 % (ref 34.8–46.1)
HGB BLD-MCNC: 12.8 G/DL (ref 11.5–15.4)
IMM GRANULOCYTES # BLD AUTO: 0.03 THOUSAND/UL (ref 0–0.2)
IMM GRANULOCYTES NFR BLD AUTO: 1 % (ref 0–2)
LYMPHOCYTES # BLD AUTO: 1.59 THOUSANDS/ΜL (ref 0.6–4.47)
LYMPHOCYTES NFR BLD AUTO: 24 % (ref 14–44)
MCH RBC QN AUTO: 25.5 PG (ref 26.8–34.3)
MCHC RBC AUTO-ENTMCNC: 32.2 G/DL (ref 31.4–37.4)
MCV RBC AUTO: 79 FL (ref 82–98)
MONOCYTES # BLD AUTO: 0.47 THOUSAND/ΜL (ref 0.17–1.22)
MONOCYTES NFR BLD AUTO: 7 % (ref 4–12)
NEUTROPHILS # BLD AUTO: 4.2 THOUSANDS/ΜL (ref 1.85–7.62)
NEUTS SEG NFR BLD AUTO: 63 % (ref 43–75)
NRBC BLD AUTO-RTO: 0 /100 WBCS
PLATELET # BLD AUTO: 287 THOUSANDS/UL (ref 149–390)
PMV BLD AUTO: 10.9 FL (ref 8.9–12.7)
POTASSIUM SERPL-SCNC: 3.7 MMOL/L (ref 3.5–5.3)
PROT SERPL-MCNC: 6.8 G/DL (ref 6.4–8.4)
RBC # BLD AUTO: 5.02 MILLION/UL (ref 3.81–5.12)
SODIUM SERPL-SCNC: 138 MMOL/L (ref 135–147)
TSH SERPL DL<=0.05 MIU/L-ACNC: 2.43 UIU/ML (ref 0.45–4.5)
WBC # BLD AUTO: 6.63 THOUSAND/UL (ref 4.31–10.16)

## 2024-12-05 PROCEDURE — 83036 HEMOGLOBIN GLYCOSYLATED A1C: CPT

## 2024-12-05 PROCEDURE — 80053 COMPREHEN METABOLIC PANEL: CPT

## 2024-12-05 PROCEDURE — 97110 THERAPEUTIC EXERCISES: CPT | Performed by: PHYSICAL THERAPIST

## 2024-12-05 PROCEDURE — 85025 COMPLETE CBC W/AUTO DIFF WBC: CPT

## 2024-12-05 PROCEDURE — 83001 ASSAY OF GONADOTROPIN (FSH): CPT

## 2024-12-05 PROCEDURE — 97140 MANUAL THERAPY 1/> REGIONS: CPT | Performed by: PHYSICAL THERAPIST

## 2024-12-05 PROCEDURE — NC001 PR NO CHARGE: Performed by: OBSTETRICS & GYNECOLOGY

## 2024-12-05 PROCEDURE — 84443 ASSAY THYROID STIM HORMONE: CPT

## 2024-12-05 NOTE — H&P
Angela Martin is a 39-year-old G5, P2 with significant menorrhagia and bicornuate uterus.  Menorrhagia is causing anemia not well-controlled with ibuprofen.  Reviewed options to treat including but not limited to Lysteda, OCP.  Patient has some history of migraines and prefers for definitive therapy with LAVH.  Patient fully counseled to LAVH bilateral salpingectomy of any remaining fallopian tubes had previous tubal ligation along with exam under anesthesia and cystoscopy.  We reviewed procedure, risks and benefits, expected outcomes and surgical instructions.  Patient was not able to get in with urogynecology until later in December to address her stress urinary incontinence.    GYN history  Contraception tubal ligation  Last Pap 2023 normal  Last mammogram 2024 left asymmetry, diagnostic left mammogram and ultrasound consistent with tissue asymmetry benign    Past Medical History:   Diagnosis Date    ADHD (attention deficit hyperactivity disorder)     Anemia     KVNG (generalized anxiety disorder)     History of transfusion 10/03/2008    Hemorrhage during child birth    Migraines     Miscarriage     , , ,      Past Surgical History:   Procedure Laterality Date     SECTION  12    DILATION AND CURETTAGE OF UTERUS      2005    KIDNEY SURGERY Right     Hydronephorsis while pregnant (pardon the spelling)    [place] SURGICAL ARTHROSCOPY SHOULDER W/LSS&RESCJ ADS Right 2024    Procedure: ARTHROSCOPY SHOULDER, Lysis of Adhesions, subacromial decompression, Manipulation Under Anesthesia;  Surgeon: Bret Azul MD;  Location: [facility];  Service: Orthopedics    TUBAL LIGATION Bilateral      OB History          5    Para   2    Term   2            AB   3    Living   2         SAB   3    IAB        Ectopic        Multiple        Live Births   2           Obstetric Comments   First trimester miscarriages              Current Outpatient Medications    Medication Instructions    aspirin 81 mg, Oral, 2 times daily    naproxen (NAPROSYN) 500 mg, Oral, 2 times daily with meals    ondansetron (ZOFRAN) 4 mg, Oral, Every 8 hours PRN     Allergies   Allergen Reactions    Tetanus-Diphtheria Toxoids Td Swelling     Social History     Tobacco Use    Smoking status: Never     Passive exposure: Never    Smokeless tobacco: Never   Vaping Use    Vaping status: Never Used   Substance Use Topics    Alcohol use: Yes     Alcohol/week: 1.0 standard drink of alcohol     Types: 1 Standard drinks or equivalent per week     Comment: Drink socially    Drug use: Never     Physical exam: Height 5 foot 2 inches, weight 140 pounds (63.5 kg, BMI 25.61, /64  Head: Normocephalic atraumatic  Lungs: Clear to auscultation bilaterally  Heart: Regular rhythm S1-S2  Abdomen: Soft nontender plus bowel sounds organomegaly prior low Pfannenstiel incision small  Extremities: Normal warm well-perfused no cyanosis clubbing or edema  Pelvic: Normal external female genitalia, normal vagina normal cervix no cervical motion tenderness uterus top normal size mobile no cervical motion tenderness normal adnexa bilaterally    Assessment: 39-year-old G5, P2 with menorrhagia and anemia desiring definitive therapy.    Plan: Laparoscopic assisted vaginal hysterectomy with bilateral salpingectomy of any remaining tube with exam under anesthesia and cystoscopy

## 2024-12-05 NOTE — PROGRESS NOTES
Daily Note         Today's date: 2024  Patient name: Angela Martin  : 1985  MRN: [id number]  Referring provider: Catrachito Hernandez PA-C  Dx:   Encounter Diagnosis     ICD-10-CM    1. Adhesive capsulitis of right shoulder  M75.01           Subjective: Angela Martin reports pain when moving arm into certain positions anterior shoulder region.      Objective: See treatment diary below    Assessment:  Patient reported arm felt better after manual intervention today. Continues to be painful into behind back shoulder motions. Shoulder felt better after scap mobs and resisted exercises.    Plan: continue ROM and strengthening per patient's tolerance.       Insurance:  [employer]/CMS Eval/ Re-eval POC expires Auth #/ Referral # Total    Start date  Expiration date Extension  Visit limitation?  PT only or  PT+OT? Co-Insurance   AMA 11.15.24 1.10.24  50V                      Precautions: standard, ODELL 24  Patient provided verbal consent to treatment plan and recommended interventions.    Postoperative range of motion:  Forward elevation 160 degrees  External rotation at 0 to 90 degrees  External rotation at 90 degrees to 100 degrees  Internal rotation at 90 degrees to 60 degrees    Access Code: [email or web address]  URL: [email or web address]  Date: 11/15/2024  Prepared by: Vega Leung    Exercises  - Supine Shoulder Flexion Extension AAROM with Dowel  - 4 x daily - 7 x weekly - 2 sets - 10 reps  - Supine Shoulder External Rotation with Dowel  - 4 x daily - 7 x weekly - 2 sets - 10 reps  - Standing Shoulder Abduction AAROM with Dowel  - 4 x daily - 7 x weekly - 2 sets - 10 reps  - Standing Shoulder Extension AAROM with Dowel  - 4 x daily - 7 x weekly - 2 sets - 10 reps    Manuals 11/22 11/25 11/26 12/3/24 12/5/24   visit 6 7 8 9 10   Scap mobs FB BG FB AD FB   Subscap release     FB   A/p mobs     FB   Neuro Re-Ed                                Ther Ex        PROM FB  FB AD FB   Cane OH AAROM flexion 2*10  "supine and stand ea. Supine 2x10 AAROM flexion Stand 2*10  2*10, 3\" supine   Supine 90/90 ER AAROM 2*10 Supine 90/90 ER       pulleys 2*10 flexion  2*10 flexion 10 x 2 flexion    Shld abduction AAROM 2*10 2x10 2*10  2*10   Shld extn AAROM 2*10 2x10 2*10     Supine punch 2*10 PT s/l 3x10 3*10     Behind back IR  10x10\" 10*10'' W/ SOS: 10 sec x10    Behind back horz. add 2*10 w/cane 2x10 2*10 10 x 2    Cross body add. nv 2x10 5*10''  2*10   Mod. Sleeper stretch  5*10''   5*10''     S/L ER     3*10, 1# w/towel   rows     15.5#, 2*15   Low rows     2*15, 12.5#           Ther Activity                Pt ed                        "

## 2024-12-06 LAB — FSH SERPL-ACNC: 3.4 MIU/ML

## 2024-12-10 ENCOUNTER — TELEPHONE (OUTPATIENT)
Age: 39
End: 2024-12-10

## 2024-12-10 ENCOUNTER — OFFICE VISIT (OUTPATIENT)
Dept: PHYSICAL THERAPY | Facility: CLINIC | Age: 39
End: 2024-12-10
Payer: COMMERCIAL

## 2024-12-10 DIAGNOSIS — M75.01 ADHESIVE CAPSULITIS OF RIGHT SHOULDER: Primary | ICD-10-CM

## 2024-12-10 PROCEDURE — 97110 THERAPEUTIC EXERCISES: CPT | Performed by: PHYSICAL THERAPIST

## 2024-12-10 PROCEDURE — 97140 MANUAL THERAPY 1/> REGIONS: CPT | Performed by: PHYSICAL THERAPIST

## 2024-12-10 NOTE — TELEPHONE ENCOUNTER
Patient scheduled for surgery with Dr. Barcenas in AM. She was given 3 pre surgery Ensure drinks and she is not sure when to take them. Phone call to office, spoke with Della and advised:   1st bottle 1 hour after dinner   2nd bottle 1 hour before bed  3rd bottle 1 hour before coming to hospital   Patient states she was given a soap to wash with tonight and asked when again in the AM to do the second soap? Call was warm transferred to Della for follow up with patient.

## 2024-12-10 NOTE — PROGRESS NOTES
Daily Note         Today's date: 12/10/2024  Patient name: Angela Martin  : 1985  MRN: [id number]  Referring provider: Catrachito Hernandez PA-C  Dx:   Encounter Diagnosis     ICD-10-CM    1. Adhesive capsulitis of right shoulder  M75.01           Subjective: Patient reports less shoulder pain since last visit.     Objective: See treatment diary below    Assessment:  Patient demonstrated improved shoulder motion post treatment today. Less catching in shoulder reported with treatment today. Less irritation with cross body adduction.     Plan: continue ROM and strengthening per patient's tolerance.       Insurance:  [employer]/CMS Eval/ Re-eval POC expires Auth #/ Referral # Total    Start date  Expiration date Extension  Visit limitation?  PT only or  PT+OT? Co-Insurance   AMA 11.15.24 1.10.24  50V                      Precautions: standard, ODELL 24  Patient provided verbal consent to treatment plan and recommended interventions.    Postoperative range of motion:  Forward elevation 160 degrees  External rotation at 0 to 90 degrees  External rotation at 90 degrees to 100 degrees  Internal rotation at 90 degrees to 60 degrees    Access Code: [email or web address]  URL: [email or web address]  Date: 11/15/2024  Prepared by: Vega Leung    Exercises  - Supine Shoulder Flexion Extension AAROM with Dowel  - 4 x daily - 7 x weekly - 2 sets - 10 reps  - Supine Shoulder External Rotation 90/90 with Dowel  - 4 x daily - 7 x weekly - 2 sets - 10 reps  - Standing Shoulder Abduction AAROM with Dowel  - 4 x daily - 7 x weekly - 2 sets - 10 reps  - Standing Shoulder Extension AAROM with Dowel  - 4 x daily - 7 x weekly - 2 sets - 10 reps    Manuals 11/25 11/26 12/3/24 12/5/24 12/10/24   visit 7 8 9 10 11   Scap mobs BG FB AD FB FB   Subscap release    FB FB   A/p mobs    FB FB, inf mobs also   Neuro Re-Ed                                Ther Ex        PROM  FB AD FB    Cane OH AAROM flexion Supine 2x10 AAROM flexion Stand  "2*10  2*10, 3\" supine 2*10, 3\" supine, also 90/90 ER 3*10   pulleys  2*10 flexion 10 x 2 flexion     Shld abduction AAROM 2x10 2*10  2*10 2*10   Shld extn AAROM 2x10 2*10      Supine punch 3x10 3*10      Behind back IR 10x10\" 10*10'' W/ SOS: 10 sec x10     Behind back horz. add 2x10 2*10 10 x 2  2*10   Cross body add. 2x10 5*10''  2*10 5*10''   Mod. Sleeper stretch 5*10''   5*10''      S/L ER    3*10, 1# w/towel    rows    15.5#, 2*15    Low rows    2*15, 12.5#    B/L ER w/ flexion     3*10 RTB                   Ther Activity        Pt ed                        "

## 2024-12-11 ENCOUNTER — HOSPITAL ENCOUNTER (OUTPATIENT)
Facility: HOSPITAL | Age: 39
Setting detail: OUTPATIENT SURGERY
Discharge: HOME | End: 2024-12-11
Attending: OBSTETRICS & GYNECOLOGY | Admitting: OBSTETRICS & GYNECOLOGY
Payer: COMMERCIAL

## 2024-12-11 ENCOUNTER — ANESTHESIA EVENT (OUTPATIENT)
Dept: PERIOP | Facility: HOSPITAL | Age: 39
End: 2024-12-11
Payer: COMMERCIAL

## 2024-12-11 ENCOUNTER — NURSE TRIAGE (OUTPATIENT)
Dept: OBGYN CLINIC | Facility: CLINIC | Age: 39
End: 2024-12-11

## 2024-12-11 ENCOUNTER — ANESTHESIA (OUTPATIENT)
Dept: PERIOP | Facility: HOSPITAL | Age: 39
End: 2024-12-11
Payer: COMMERCIAL

## 2024-12-11 VITALS
BODY MASS INDEX: 25.76 KG/M2 | DIASTOLIC BLOOD PRESSURE: 59 MMHG | RESPIRATION RATE: 16 BRPM | TEMPERATURE: 98.3 F | WEIGHT: 139.99 LBS | HEART RATE: 88 BPM | HEIGHT: 62 IN | SYSTOLIC BLOOD PRESSURE: 103 MMHG | OXYGEN SATURATION: 95 %

## 2024-12-11 DIAGNOSIS — Z90.710 S/P LAPAROSCOPIC ASSISTED VAGINAL HYSTERECTOMY (LAVH): Primary | ICD-10-CM

## 2024-12-11 DIAGNOSIS — N92.0 MENORRHAGIA WITH REGULAR CYCLE: ICD-10-CM

## 2024-12-11 DIAGNOSIS — M75.01 ADHESIVE CAPSULITIS OF RIGHT SHOULDER: ICD-10-CM

## 2024-12-11 DIAGNOSIS — D64.9 ANEMIA, UNSPECIFIED TYPE: ICD-10-CM

## 2024-12-11 PROBLEM — N93.9 ABNORMAL UTERINE BLEEDING (AUB): Status: ACTIVE | Noted: 2024-12-11

## 2024-12-11 LAB
ABO GROUP BLD: NORMAL
ABO GROUP BLD: NORMAL
BLD GP AB SCN SERPL QL: NEGATIVE
EXT PREGNANCY TEST URINE: NEGATIVE
EXT. CONTROL: NORMAL
RH BLD: POSITIVE
RH BLD: POSITIVE
SPECIMEN EXPIRATION DATE: NORMAL

## 2024-12-11 PROCEDURE — 86900 BLOOD TYPING SEROLOGIC ABO: CPT | Performed by: OBSTETRICS & GYNECOLOGY

## 2024-12-11 PROCEDURE — 88307 TISSUE EXAM BY PATHOLOGIST: CPT | Performed by: PATHOLOGY

## 2024-12-11 PROCEDURE — 86850 RBC ANTIBODY SCREEN: CPT | Performed by: OBSTETRICS & GYNECOLOGY

## 2024-12-11 PROCEDURE — 58552 LAPARO-VAG HYST INCL T/O: CPT | Performed by: OBSTETRICS & GYNECOLOGY

## 2024-12-11 PROCEDURE — 81025 URINE PREGNANCY TEST: CPT | Performed by: OBSTETRICS & GYNECOLOGY

## 2024-12-11 PROCEDURE — 86901 BLOOD TYPING SEROLOGIC RH(D): CPT | Performed by: OBSTETRICS & GYNECOLOGY

## 2024-12-11 RX ORDER — LIDOCAINE HYDROCHLORIDE 10 MG/ML
INJECTION, SOLUTION EPIDURAL; INFILTRATION; INTRACAUDAL; PERINEURAL AS NEEDED
Status: DISCONTINUED | OUTPATIENT
Start: 2024-12-11 | End: 2024-12-11

## 2024-12-11 RX ORDER — CEFAZOLIN SODIUM 1 G/50ML
1000 SOLUTION INTRAVENOUS ONCE
Status: COMPLETED | OUTPATIENT
Start: 2024-12-11 | End: 2024-12-11

## 2024-12-11 RX ORDER — ONDANSETRON 4 MG/1
4 TABLET, FILM COATED ORAL EVERY 8 HOURS PRN
Qty: 12 TABLET | Refills: 0 | Status: SHIPPED | OUTPATIENT
Start: 2024-12-11

## 2024-12-11 RX ORDER — MAGNESIUM HYDROXIDE 1200 MG/15ML
LIQUID ORAL AS NEEDED
Status: DISCONTINUED | OUTPATIENT
Start: 2024-12-11 | End: 2024-12-11 | Stop reason: HOSPADM

## 2024-12-11 RX ORDER — ONDANSETRON 2 MG/ML
4 INJECTION INTRAMUSCULAR; INTRAVENOUS EVERY 6 HOURS PRN
Status: DISCONTINUED | OUTPATIENT
Start: 2024-12-11 | End: 2024-12-11 | Stop reason: HOSPADM

## 2024-12-11 RX ORDER — EPHEDRINE SULFATE 50 MG/ML
INJECTION INTRAVENOUS AS NEEDED
Status: DISCONTINUED | OUTPATIENT
Start: 2024-12-11 | End: 2024-12-11

## 2024-12-11 RX ORDER — DEXAMETHASONE SODIUM PHOSPHATE 10 MG/ML
INJECTION, SOLUTION INTRAMUSCULAR; INTRAVENOUS AS NEEDED
Status: DISCONTINUED | OUTPATIENT
Start: 2024-12-11 | End: 2024-12-11

## 2024-12-11 RX ORDER — HYDROMORPHONE HCL/PF 1 MG/ML
0.5 SYRINGE (ML) INJECTION
Status: DISCONTINUED | OUTPATIENT
Start: 2024-12-11 | End: 2024-12-11 | Stop reason: HOSPADM

## 2024-12-11 RX ORDER — PROPOFOL 10 MG/ML
INJECTION, EMULSION INTRAVENOUS AS NEEDED
Status: DISCONTINUED | OUTPATIENT
Start: 2024-12-11 | End: 2024-12-11

## 2024-12-11 RX ORDER — METRONIDAZOLE 7.5 MG/G
GEL VAGINAL AS NEEDED
Status: DISCONTINUED | OUTPATIENT
Start: 2024-12-11 | End: 2024-12-11 | Stop reason: HOSPADM

## 2024-12-11 RX ORDER — IBUPROFEN 600 MG/1
600 TABLET, FILM COATED ORAL EVERY 6 HOURS PRN
Status: DISCONTINUED | OUTPATIENT
Start: 2024-12-11 | End: 2024-12-11 | Stop reason: HOSPADM

## 2024-12-11 RX ORDER — IBUPROFEN 600 MG/1
600 TABLET, FILM COATED ORAL EVERY 6 HOURS PRN
Qty: 30 TABLET | Refills: 0 | Status: SHIPPED | OUTPATIENT
Start: 2024-12-11

## 2024-12-11 RX ORDER — METOCLOPRAMIDE HYDROCHLORIDE 5 MG/ML
INJECTION INTRAMUSCULAR; INTRAVENOUS AS NEEDED
Status: DISCONTINUED | OUTPATIENT
Start: 2024-12-11 | End: 2024-12-11

## 2024-12-11 RX ORDER — BUPIVACAINE HYDROCHLORIDE 2.5 MG/ML
INJECTION, SOLUTION EPIDURAL; INFILTRATION; INTRACAUDAL AS NEEDED
Status: DISCONTINUED | OUTPATIENT
Start: 2024-12-11 | End: 2024-12-11 | Stop reason: HOSPADM

## 2024-12-11 RX ORDER — TRAMADOL HYDROCHLORIDE 50 MG/1
50 TABLET ORAL EVERY 6 HOURS PRN
Qty: 20 TABLET | Refills: 0 | Status: SHIPPED | OUTPATIENT
Start: 2024-12-11

## 2024-12-11 RX ORDER — ONDANSETRON 2 MG/ML
INJECTION INTRAMUSCULAR; INTRAVENOUS AS NEEDED
Status: DISCONTINUED | OUTPATIENT
Start: 2024-12-11 | End: 2024-12-11

## 2024-12-11 RX ORDER — FENTANYL CITRATE/PF 50 MCG/ML
50 SYRINGE (ML) INJECTION
Status: DISCONTINUED | OUTPATIENT
Start: 2024-12-11 | End: 2024-12-11 | Stop reason: HOSPADM

## 2024-12-11 RX ORDER — FENTANYL CITRATE 50 UG/ML
INJECTION, SOLUTION INTRAMUSCULAR; INTRAVENOUS AS NEEDED
Status: DISCONTINUED | OUTPATIENT
Start: 2024-12-11 | End: 2024-12-11

## 2024-12-11 RX ORDER — MIDAZOLAM HYDROCHLORIDE 2 MG/2ML
INJECTION, SOLUTION INTRAMUSCULAR; INTRAVENOUS AS NEEDED
Status: DISCONTINUED | OUTPATIENT
Start: 2024-12-11 | End: 2024-12-11

## 2024-12-11 RX ORDER — ONDANSETRON 2 MG/ML
4 INJECTION INTRAMUSCULAR; INTRAVENOUS ONCE AS NEEDED
Status: DISCONTINUED | OUTPATIENT
Start: 2024-12-11 | End: 2024-12-11 | Stop reason: HOSPADM

## 2024-12-11 RX ORDER — SODIUM CHLORIDE, SODIUM LACTATE, POTASSIUM CHLORIDE, CALCIUM CHLORIDE 600; 310; 30; 20 MG/100ML; MG/100ML; MG/100ML; MG/100ML
INJECTION, SOLUTION INTRAVENOUS CONTINUOUS PRN
Status: DISCONTINUED | OUTPATIENT
Start: 2024-12-11 | End: 2024-12-11

## 2024-12-11 RX ORDER — SODIUM CHLORIDE, SODIUM LACTATE, POTASSIUM CHLORIDE, CALCIUM CHLORIDE 600; 310; 30; 20 MG/100ML; MG/100ML; MG/100ML; MG/100ML
125 INJECTION, SOLUTION INTRAVENOUS CONTINUOUS
Status: DISCONTINUED | OUTPATIENT
Start: 2024-12-11 | End: 2024-12-11 | Stop reason: HOSPADM

## 2024-12-11 RX ORDER — OXYCODONE HYDROCHLORIDE 5 MG/1
5 TABLET ORAL EVERY 4 HOURS PRN
Status: DISCONTINUED | OUTPATIENT
Start: 2024-12-11 | End: 2024-12-11 | Stop reason: HOSPADM

## 2024-12-11 RX ORDER — ACETAMINOPHEN 325 MG/1
975 TABLET ORAL EVERY 6 HOURS PRN
Status: DISCONTINUED | OUTPATIENT
Start: 2024-12-11 | End: 2024-12-11 | Stop reason: HOSPADM

## 2024-12-11 RX ORDER — SODIUM CHLORIDE 9 MG/ML
INJECTION, SOLUTION INTRAVENOUS AS NEEDED
Status: DISCONTINUED | OUTPATIENT
Start: 2024-12-11 | End: 2024-12-11 | Stop reason: HOSPADM

## 2024-12-11 RX ADMIN — FENTANYL CITRATE 50 MCG: 50 INJECTION INTRAMUSCULAR; INTRAVENOUS at 12:47

## 2024-12-11 RX ADMIN — HYDROMORPHONE HYDROCHLORIDE 0.5 MG: 1 INJECTION, SOLUTION INTRAMUSCULAR; INTRAVENOUS; SUBCUTANEOUS at 13:28

## 2024-12-11 RX ADMIN — PROPOFOL 50 MCG/KG/MIN: 10 INJECTION, EMULSION INTRAVENOUS at 10:16

## 2024-12-11 RX ADMIN — METOCLOPRAMIDE 10 MG: 5 INJECTION, SOLUTION INTRAMUSCULAR; INTRAVENOUS at 09:53

## 2024-12-11 RX ADMIN — LIDOCAINE HYDROCHLORIDE 50 MG: 10 INJECTION, SOLUTION EPIDURAL; INFILTRATION; INTRACAUDAL; PERINEURAL at 09:55

## 2024-12-11 RX ADMIN — DEXAMETHASONE SODIUM PHOSPHATE 10 MG: 10 INJECTION, SOLUTION INTRAMUSCULAR; INTRAVENOUS at 10:14

## 2024-12-11 RX ADMIN — ONDANSETRON 4 MG: 2 INJECTION INTRAMUSCULAR; INTRAVENOUS at 09:53

## 2024-12-11 RX ADMIN — SUGAMMADEX 200 MG: 100 INJECTION, SOLUTION INTRAVENOUS at 11:49

## 2024-12-11 RX ADMIN — ONDANSETRON 4 MG: 2 INJECTION INTRAMUSCULAR; INTRAVENOUS at 11:55

## 2024-12-11 RX ADMIN — SODIUM CHLORIDE, SODIUM LACTATE, POTASSIUM CHLORIDE, AND CALCIUM CHLORIDE 125 ML/HR: .6; .31; .03; .02 INJECTION, SOLUTION INTRAVENOUS at 09:30

## 2024-12-11 RX ADMIN — SODIUM CHLORIDE, SODIUM LACTATE, POTASSIUM CHLORIDE, AND CALCIUM CHLORIDE: .6; .31; .03; .02 INJECTION, SOLUTION INTRAVENOUS at 09:53

## 2024-12-11 RX ADMIN — FENTANYL CITRATE 50 MCG: 50 INJECTION INTRAMUSCULAR; INTRAVENOUS at 13:01

## 2024-12-11 RX ADMIN — PROPOFOL 200 MG: 10 INJECTION, EMULSION INTRAVENOUS at 09:55

## 2024-12-11 RX ADMIN — CEFAZOLIN SODIUM 1000 MG: 1 SOLUTION INTRAVENOUS at 09:53

## 2024-12-11 RX ADMIN — HYDROMORPHONE HYDROCHLORIDE 0.5 MG: 1 INJECTION, SOLUTION INTRAMUSCULAR; INTRAVENOUS; SUBCUTANEOUS at 13:14

## 2024-12-11 RX ADMIN — FENTANYL CITRATE 100 MCG: 50 INJECTION, SOLUTION INTRAMUSCULAR; INTRAVENOUS at 09:55

## 2024-12-11 RX ADMIN — FENTANYL CITRATE 50 MCG: 50 INJECTION, SOLUTION INTRAMUSCULAR; INTRAVENOUS at 10:41

## 2024-12-11 RX ADMIN — MIDAZOLAM 2 MG: 1 INJECTION INTRAMUSCULAR; INTRAVENOUS at 09:53

## 2024-12-11 RX ADMIN — EPHEDRINE SULFATE 5 MG: 50 INJECTION, SOLUTION INTRAVENOUS at 10:15

## 2024-12-11 RX ADMIN — FENTANYL CITRATE 50 MCG: 50 INJECTION, SOLUTION INTRAMUSCULAR; INTRAVENOUS at 11:51

## 2024-12-11 NOTE — ANESTHESIA POSTPROCEDURE EVALUATION
Post-Op Assessment Note    CV Status:  Stable    Pain management: adequate       Mental Status:  Alert and awake   Hydration Status:  Euvolemic and stable   PONV Controlled:  Controlled   Airway Patency:  Patent     Post Op Vitals Reviewed: Yes    No anethesia notable event occurred.    Staff: Anesthesiologist           Last Filed PACU Vitals:  Vitals Value Taken Time   Temp 98.3 °F (36.8 °C) 12/11/24 1210   Pulse 100 12/11/24 1324   /58 12/11/24 1324   Resp 11 12/11/24 1324   SpO2 96 % 12/11/24 1324       Modified Randal:  Activity: 2 (12/11/2024  1:24 PM)  Respiration: 2 (12/11/2024  1:24 PM)  Circulation: 2 (12/11/2024  1:24 PM)  Consciousness: 1 (12/11/2024  1:24 PM)  Oxygen Saturation: 2 (12/11/2024  1:24 PM)  Modified Randal Score: 9 (12/11/2024  1:24 PM)

## 2024-12-11 NOTE — PATIENT COMMUNICATION
Call to pt to follow up on her [employer] message.   Pt had a hysterectomy with bilateral salpingectomy today with Dr. Barcenas. States she was advised percocet and something else would be prescribed for her but there is nothing at her pharmacy. Pt states her pain is currently a 6/10 while relaxing. However if she tries to walk, it goes up to a 10/10, she feels sweaty and like she will pass out/vomit. Pt is unsure about her bleeding but denies saturating through her pad. Denies any fever. Confirmed pharmacy is [facility]. Advised will follow up with Dr. Barcenas. Pt aware to call back with any worsening symptoms. Pt thankful.     ESC sent to Surgeon- Dr. Barcenas- will send pain medication, high dose ibuprofen and nausea medication.

## 2024-12-11 NOTE — OP NOTE
OPERATIVE REPORT  PATIENT NAME: Angela Martin    :  1985  MRN: [id number]  Pt Location: [facility] OR ROOM 03    SURGERY DATE: 2024    Surgeons and Role:     * Mckenna Barcenas MD - Primary     * Luís Jeffries MD - Assisting    Preop Diagnosis:  Menorrhagia with regular cycle [N92.0]  Anemia, unspecified type [D64.9]    Post-Op Diagnosis Codes:     * Menorrhagia with regular cycle [N92.0]     * Anemia, unspecified type [D64.9]    Procedure(s):  Bilateral - HYSTERECTOMY LAPAROSCOPIC ASSISTED VAGINAL (LAVH) WITH BILATERAL SALPINGECTOMY. EXAM UNDER ANESTHESIA  CYSTOSCOPY    Specimen(s):  ID Type Source Tests Collected by Time Destination   1 : Uterus with Cervix & Bilateral Fallopian Tubes Tissue Uterus TISSUE EXAM Mckenna Barcenas MD 2024 1151        Estimated Blood Loss:   350 mL    Drains:  [REMOVED] Urethral Catheter Non-latex 16 Fr. (Removed)   Number of days: 0       Anesthesia Type:   General    Operative Indications:  Menorrhagia with regular cycle [N92.0]  Anemia, unspecified type [D64.9]    Operative Findings:  Normal appearing external female genitalia  Bimanual exam with retroverted uterus, normal in size without palpable lesions or masses  Laparoscopic view with normal appearing liver, underlying bowel, and omentum; appendix not visualized  Normal appearing uterus, bilateral Fallopian tubes, and ovaries  Mild adhesive disease along anterior surface of uterus and bladder  On vaginal removal of specimen, continued bleeding noted along left lateral vascular pedicle controlled with 3-0 Vicryl suture placed in a figure of eight fashion  Mild oozing along left lateral pedicle on laparoscopic view, controlled with application of Surgicel Powder with no further active bleeding or oozing noted on high and low pressure  Intact vaginal cuff on bimanual exam  Metrogel applied vaginally on completion of procedure  Cystoscopy without apparent injury to bladder mucosa and noted bilateral urine jets from ureteral  orifices      Complications:   None apparent    Brief History    All risks, benefits, and alternatives to the procedure were discussed with the patient and she had the opportunity to ask questions. Informed consent was obtained.     Description of Procedure    Patient was taken to the operating room where a time out was performed to confirm correct patient and correct procedure. General endotracheal anesthesia (GET) was administered and the patient was positioned on the OR table in the dorsal lithotomy position. Arms were positioned in a neutral position at the sides and secured by tucking a draw sheet. All pressure points were padded and a jamarcus hugger was placed to maintain control of core body temperature. A bimanual exam was performed and the uterus was noted to be retroverted, normal in size and consistency with no palpable adnexal masses or fullness. The patient was prepped and draped in the usual sterile fashion with chloroprep on the abdomen and chlorhexidine prep on the vagina and perineum.    Operative Technique    The Aesculap weighted speculum was inserted into the vagina and a [place] retractor was used to visualize the anterior lip of the cervix, which was then grasped with a single toothed tenaculum. Under direct visualization the uterus was sounded to approximately 9.5 cm. Cervix was dilated for introduction of the FELIX uterine manipulator. FELIX was secured in place with a stitch of 0 Vicryl. Mccord catheter was placed and the intravaginal occluder balloon was inflated. Sterile gloves were exchanged and attention was turned to the abdomen.    A 5 mm skin incision was made in the umbilicus with a scalpel for introduction of a 5mm trochar under direct visualization. Opening pressure was noted to be 4 mmHg. Good intraperitoneal location was confirmed and pneumoperitoneum was created to maximal pressure of 15 mmHg. The patient was placed in Trendelenburg. Two additional 5mm trocars were placed in a similar  fashion in the right and left lower quadrants, approximately 2cm superior and medial to the anterior superior iliac spine. Findings noted as above.    The ureters were clearly identified transperitoneally on both sides. Bilateral uteroovarian ligaments were cauterized and divided. The right fallopian tube was grasped at its fimbriated end with a blunt grasper and elevated to visualize the mesosalpinx. Enseal device was used to ligate along the mesosalpinx, working proximally and taking care to avoid ovarian vasculature. Attention was then turned to the contralateral tube, which was amputated in similar fashion. The round ligaments were cauterized and divided. The vesicouterine peritoneal reflection was divided with a mix of blunt and sharp dissection and the bladder was mobilized anteriorly. The uterine vessels were cauterized down to the level of the bladder flap. Attention was then drawn to the vagina for completion of the procedure.    The cervix was grasped with Kocher clamps and the cervicovaginal junction identified, after which dilute Vasopressin was infiltrated into the cervicovaginal junction in a circumferential fashion. Next, the posterior cervicovaginal junction was incised with a #15 blade scalpel, after which the same was performed anteriorly. The incisions were then connected with the scalpel.     The posterior colpotomy was made with Villegas scissors. The posterior cul-de-sac was entered bluntly and the long billed weighted speculum was inserted between the vagina and the rectum. The anterior cul-de-sac was not immediately palpable and decision was made to connect the two colpotomy incisions. Vascular pedicles were ligated with 0 Vicryl along the lower portion of the cervix, incorporating the uterosacral and cardinal ligaments with curved Tyrese clamps. The ligated pedicles were then cut with Villegas scissors, and suture ligated with 0 Vicryl in a Tyrese fashion with excellent hemostasis. After release of  the uterosacral ligaments, the anterior cul-de-sac was entered sharply without difficulty and the Venus retractor inserted to elevate the bladder and ureters. The remainder of the hysterectomy was performed incorporating uterine artery pedicles and cornual region pedicles until the specimen was removed with Tyrese clamps. All pedicles were suture ligated with 0 Vicryl in a Tyrese fashion. Moderate bleeding was appreciated from the left vascular pedicle on removal of specimen. The tissue was grasped with a Tyrese clamp and bleeding was controlled with placement of hemostatic suture of 3-0 Vicryl in a Tyrese fashion.     The lateral apices of the vaginal cuff were fixated with 0 Vicryl in a figure-of-eight fashion. The vaginal cuff was closed in a figure-of-eight fashion, taking care to incorporate the uterosacral ligaments, with 0 Vicryl. Palpation of the vaginal cuff on completion of closure indicated intact tissue without palpable aperture.    Sterile gown and gloves were exchanged and laparoscopic view was obtained. Mild oozing from the left lateral aspect of the cuff was appreciated. Surgicel Powder was applied for hemostasis. No further bleeding was noted on both high and low pressure environments. All trocars were removed and pneumoperitoneum was released. The skin at all port sites was closed using a subcuticular stitch of 4-0 Monocryl and dressed with Exofin.    Vaginal exam revealed excellent closure and hemostasis. Metrogel was applied within the vaginal cavity. The Mccord catheter was removed. The bladder was filled in retrograde fashion with approximately 200 mL of normal saline. Using the 5 mm cystoscope, cystoscopy was performed and the above-mentioned findings were noted. The bladder was emptied with a Mccord catheter which was removed at the completion of the procedure.    At the conclusion of the procedure, all needle, sponge, and instrument counts were noted to be correct x2. Patient tolerated the  procedure well and was transferred to PACU in stable condition.    Dr. Barcenas was present and participated in all key portions of the case.         Patient Disposition:  PACU              SIGNATURE: Luís Jeffries MD  DATE: December 11, 2024  TIME: 12:18 PM

## 2024-12-11 NOTE — ANESTHESIA PREPROCEDURE EVALUATION
Procedure:  HYSTERECTOMY LAPAROSCOPIC ASSISTED VAGINAL (LAVH) WITH BILATERAL SALPINGECTOMY, EXAM UNDER ANESTHESIA (Bilateral: Abdomen)  CYSTOSCOPY (Bladder)    Relevant Problems   ANESTHESIA (within normal limits)      CARDIO (within normal limits)   (+) Intractable migraine without aura and without status migrainosus      ENDO (within normal limits)      HEMATOLOGY   (+) Alpha thalassemia trait      MUSCULOSKELETAL   (+) Adhesive capsulitis of right shoulder      NEURO/PSYCH   (+) Intractable migraine without aura and without status migrainosus      PULMONARY (within normal limits)        Physical Exam    Airway    Mallampati score: II  TM Distance: >3 FB  Neck ROM: full     Dental   No notable dental hx     Cardiovascular  Rhythm: regular, Rate: normal    Pulmonary   Breath sounds clear to auscultation    Other Findings  post-pubertal.      Anesthesia Plan  ASA Score- 2     Anesthesia Type- general with ASA Monitors.         Additional Monitors:     Airway Plan: ETT.           Plan Factors-Exercise tolerance (METS): >4 METS.    Chart reviewed. EKG reviewed.  Existing labs reviewed. Patient summary reviewed.    Patient is not a current smoker.              Induction- intravenous.    Postoperative Plan- Plan for postoperative opioid use. Planned trial extubation        Informed Consent- Anesthetic plan and risks discussed with patient.  I personally reviewed this patient with the CRNA. Discussed and agreed on the Anesthesia Plan with the CRNA..

## 2024-12-11 NOTE — INTERVAL H&P NOTE
H&P reviewed. After examining the patient I find no changes in the patients condition since the H&P had been written.    Vitals:    12/11/24 0926   BP: 108/56   Pulse: 82   Resp: 18   Temp: 97.5 °F (36.4 °C)   SpO2: 97%

## 2024-12-11 NOTE — TELEPHONE ENCOUNTER
"Call to pt to follow up on her [employer] message.   Pt had a hysterectomy with bilateral salpingectomy today with Dr. Barcenas. States she was advised percocet and something else would be prescribed for her but there is nothing at her pharmacy. Pt states her pain is currently a 6/10 while relaxing. However if she tries to walk, it goes up to a 10/10, she feels sweaty and like she will pass out/vomit. Pt is unsure about her bleeding but denies saturating through her pad. Denies any fever. Confirmed pharmacy is [facility]. Advised will follow up with Dr. Barcenas. Pt aware to call back with any worsening symptoms. Pt thankful.      ESC sent to Surgeon- Dr. Barcenas- will send pain medication, high dose ibuprofen and nausea medication.    Call back to pt and advised medications are being sent over. Pt thankful.   Reason for Disposition   MILD TO MODERATE post-op pain (e.g., pain scale 1-7) that is not controlled with pain medications    Answer Assessment - Initial Assessment Questions  1. SYMPTOM: \"What's the main symptom you're concerned about?\" (e.g., pain, fever, vomiting)      Severe pain  2. ONSET: \"When did symptoms  start?\"      After surgery  3. SURGERY: \"What surgery did you have?\"      hysterectomy  4. DATE of SURGERY: \"When was the surgery?\"       today  5. ANESTHESIA: \"What type of anesthesia did you have?\" (e.g., general, spinal, epidural, local)        6. DRAINS: \"Were any drains place in or around the wound?\" (e.g., Hemovac, Op-Leone, Penrose)      denies  7. PAIN: \"Is there any pain?\" If Yes, ask: \"How bad is it?\"  (Scale 1-10; or mild, moderate, severe)      6/10 pain currently, 10/10 if she is up trying to walk  8. FEVER: \"Do you have a fever?\" If Yes, ask: \"What is your temperature, how was it measured, and when did it start?\"      denies  9. VOMITING: \"Is there any vomiting?\" If Yes, ask: \"How many times?\"      Denies but is nauseous if she walks around  10. BLEEDING: \"Is there any bleeding?\" " "If Yes, ask: \"How much?\" and \"Where?\"        Unsure how much  11. OTHER SYMPTOMS: \"Do you have any other symptoms?\" (e.g., drainage from wound, painful urination, constipation)        denies    Protocols used: Post-Op Symptoms and Questions-Adult-OH    "

## 2024-12-11 NOTE — ANESTHESIA POSTPROCEDURE EVALUATION
Post-Op Assessment Note    CV Status:  Stable  Pain Score: 0    Pain management: adequate       Mental Status:  Arousable   Hydration Status:  Stable   PONV Controlled:  None   Airway Patency:  Patent     Post Op Vitals Reviewed: Yes    No anethesia notable event occurred.    Staff: CRNA           Last Filed PACU Vitals:  Vitals Value Taken Time   Temp 98.3    Pulse 109    /63    Resp 14    SpO2 98 on FM O2        Modified Randal:  No data recorded

## 2024-12-12 ENCOUNTER — APPOINTMENT (OUTPATIENT)
Dept: PHYSICAL THERAPY | Facility: CLINIC | Age: 39
End: 2024-12-12
Payer: COMMERCIAL

## 2024-12-16 PROCEDURE — 88307 TISSUE EXAM BY PATHOLOGIST: CPT | Performed by: PATHOLOGY

## 2024-12-17 ENCOUNTER — APPOINTMENT (OUTPATIENT)
Dept: PHYSICAL THERAPY | Facility: CLINIC | Age: 39
End: 2024-12-17
Payer: COMMERCIAL

## 2024-12-19 ENCOUNTER — OFFICE VISIT (OUTPATIENT)
Dept: PHYSICAL THERAPY | Facility: CLINIC | Age: 39
End: 2024-12-19
Payer: COMMERCIAL

## 2024-12-19 DIAGNOSIS — M75.01 ADHESIVE CAPSULITIS OF RIGHT SHOULDER: Primary | ICD-10-CM

## 2024-12-19 PROCEDURE — 97110 THERAPEUTIC EXERCISES: CPT | Performed by: PHYSICAL THERAPIST

## 2024-12-19 PROCEDURE — 97140 MANUAL THERAPY 1/> REGIONS: CPT | Performed by: PHYSICAL THERAPIST

## 2024-12-19 NOTE — PROGRESS NOTES
Daily Note         Today's date: 2024  Patient name: Angela Martin  : 1985  MRN: [id number]  Referring provider: Catrachito Hernandez PA-C  Dx:   Encounter Diagnosis     ICD-10-CM    1. Adhesive capsulitis of right shoulder  M75.01           Subjective: Patient reports fair shoulder ROM HEP compliance. Recent hysterectomy and has limited lifting.     Objective: See treatment diary below    Assessment:  Patient demonstrated improved shoulder motion as session progressed. Less scapular restriction after manual intervention.     Plan: continue ROM and strengthening per patient's tolerance.       Insurance:  [employer]/CMS Eval/ Re-eval POC expires Auth #/ Referral # Total    Start date  Expiration date Extension  Visit limitation?  PT only or  PT+OT? Co-Insurance   AMA 11.15.24 1.10.24  50V                      Precautions: standard, ODELL 24  Patient provided verbal consent to treatment plan and recommended interventions.    Postoperative range of motion:  Forward elevation 160 degrees  External rotation at 0 to 90 degrees  External rotation at 90 degrees to 100 degrees  Internal rotation at 90 degrees to 60 degrees    Access Code: [email or web address]  URL: [email or web address]  Date: 11/15/2024  Prepared by: Vega Leung    Exercises  - Supine Shoulder Flexion Extension AAROM with Dowel  - 4 x daily - 7 x weekly - 2 sets - 10 reps  - Supine Shoulder External Rotation 90/90 with Dowel  - 4 x daily - 7 x weekly - 2 sets - 10 reps  - Standing Shoulder Abduction AAROM with Dowel  - 4 x daily - 7 x weekly - 2 sets - 10 reps  - Standing Shoulder Extension AAROM with Dowel  - 4 x daily - 7 x weekly - 2 sets - 10 reps    Manuals 11/25 11/26 12/3/24 12/5/24 12/10/24 12/19/24   visit 7 8 9 10 11 12   Scap mobs BG FB AD FB FB FB   Subscap release    FB FB FB   A/p mobs    FB FB, inf mobs also FB   Neuro Re-Ed                                    Ther Ex         PROM  FB AD FB     [place] AAROM flexion Supine  "2x10 AAROM flexion Stand 2*10  2*10, 3\" supine 2*10, 3\" supine, also 90/90 ER 3*10 2*10, 3\" supine, also 90/90 ER 3*10   pulleys  2*10 flexion 10 x 2 flexion      Shld abduction AAROM 2x10 2*10  2*10 2*10 2*10   Shld extn AAROM 2x10 2*10       Supine punch 3x10 3*10       Behind back IR 10x10\" 10*10'' W/ SOS: 10 sec x10      Behind back horz. add 2x10 2*10 10 x 2  2*10    Cross body add. 2x10 5*10''  2*10 5*10'' 5*10''   Mod. Sleeper stretch 5*10''   5*10''       S/L ER    3*10, 1# w/towel     rows    15.5#, 2*15     Low rows    2*15, 12.5#     B/L ER w/ flexion     3*10 RTB                      Ther Activity         Pt ed                          "

## 2024-12-20 ENCOUNTER — OFFICE VISIT (OUTPATIENT)
Dept: OBGYN CLINIC | Facility: CLINIC | Age: 39
End: 2024-12-20

## 2024-12-20 VITALS
BODY MASS INDEX: 25.21 KG/M2 | WEIGHT: 137 LBS | HEIGHT: 62 IN | DIASTOLIC BLOOD PRESSURE: 74 MMHG | SYSTOLIC BLOOD PRESSURE: 106 MMHG

## 2024-12-20 DIAGNOSIS — Z90.710 S/P LAPAROSCOPIC ASSISTED VAGINAL HYSTERECTOMY (LAVH): Primary | ICD-10-CM

## 2024-12-20 PROCEDURE — 99024 POSTOP FOLLOW-UP VISIT: CPT | Performed by: OBSTETRICS & GYNECOLOGY

## 2024-12-20 NOTE — PROGRESS NOTES
Subjective     Angela Martin is a 39 y.o.  female here for a postop visit.  Patient is 10 days post LAVH bilateral salpingectomy steadily recovering.  Patient went shopping at [employer] yesterday and felt more pelvic discomfort afterwards and had to rest more.  Still having some more gas pains off and on but able to move bowels discussed simethicone products and activity with rest between.  Tolerating regular diet, voiding and bowel without difficulty otherwise.     Gynecologic History  Patient's last menstrual period was 2024 (exact date).  Contraception: status post hysterectomy  Last Pap: 2023. Results were: normal  Last mammogram: Over . Results were: Left asymmetry diagnostic left mammogram and ultrasound consistent with benign asymmetry    Obstetric History  OB History    Para Term  AB Living   5 2 2  3 2   SAB IAB Ectopic Multiple Live Births   3    2      # Outcome Date GA Lbr Socrates/2nd Weight Sex Type Anes PTL Lv   5 Term     M Vag-Spont   ROD   4 Term     F CS-Unspec   ROD   3 SAB            2 SAB            1 SAB               Obstetric Comments   First trimester miscarriages          The following portions of the patient's history were reviewed and updated as appropriate: allergies, current medications, past family history, past medical history, past social history, past surgical history, and problem list.    Review of Systems  Review of Systems   Constitutional:  Negative for chills and fever.   HENT:  Negative for ear pain and sore throat.    Eyes:  Negative for pain and visual disturbance.   Respiratory:  Negative for cough and shortness of breath.    Cardiovascular:  Negative for chest pain and palpitations.   Gastrointestinal:  Negative for abdominal pain and vomiting.   Genitourinary:  Positive for pelvic pain. Negative for dysuria and hematuria.   Musculoskeletal:  Negative for arthralgias and back pain.   Skin:  Negative for color change and rash.   Neurological:   "Negative for seizures and syncope.   All other systems reviewed and are negative.       Objective     /74 (BP Location: Left arm, Patient Position: Sitting, Cuff Size: Standard)   Ht 5' 2\" (1.575 m)   Wt 62.1 kg (137 lb)   LMP 2024 (Exact Date)   BMI 25.06 kg/m²   General appearance: alert and oriented, in no acute distress  Head: Normocephalic, without obvious abnormality, atraumatic  Lungs: clear to auscultation bilaterally  Heart: regular rate and rhythm, S1, S2 normal, no murmur, click, rub or gallop  Abdomen: soft, non-tender; bowel sounds normal; no masses,  no organomegaly and incisions clean dry intact healing well glue is still present  Extremities: extremities normal, warm and well-perfused; no cyanosis, clubbing, or edema      Assessment  39-year-old  here for postop visit 10 days post LAVH bilateral salpingectomy steadily recovering.  Reviewed pathology and pictures.  Patient is generally feeling like she is improving well, still some gas pains.  Went shopping at [employer] and had some pelvic pressure and had to rest more.    Plan  Reviewed how to remove glue from incisions and encouraged to do so she is having some itching return in 2 to 3 weeks for next postop  "

## 2024-12-24 ENCOUNTER — OFFICE VISIT (OUTPATIENT)
Dept: PHYSICAL THERAPY | Facility: CLINIC | Age: 39
End: 2024-12-24
Payer: COMMERCIAL

## 2024-12-24 ENCOUNTER — OFFICE VISIT (OUTPATIENT)
Dept: OBGYN CLINIC | Facility: CLINIC | Age: 39
End: 2024-12-24

## 2024-12-24 VITALS — HEIGHT: 62 IN | BODY MASS INDEX: 25.21 KG/M2 | WEIGHT: 137 LBS

## 2024-12-24 DIAGNOSIS — Z09 S/P ORTHOPEDIC SURGERY, FOLLOW-UP EXAM: ICD-10-CM

## 2024-12-24 DIAGNOSIS — M75.01 ADHESIVE CAPSULITIS OF RIGHT SHOULDER: Primary | ICD-10-CM

## 2024-12-24 PROCEDURE — 97110 THERAPEUTIC EXERCISES: CPT | Performed by: PHYSICAL THERAPIST

## 2024-12-24 PROCEDURE — 97140 MANUAL THERAPY 1/> REGIONS: CPT

## 2024-12-24 PROCEDURE — 99024 POSTOP FOLLOW-UP VISIT: CPT | Performed by: ORTHOPAEDIC SURGERY

## 2024-12-24 NOTE — PROGRESS NOTES
Assessment/Plan:  1. Adhesive capsulitis of right shoulder        2. S/P orthopedic surgery, follow-up exam          Scribe Attestation    I,:  Melchor Yoder MA am acting as a scribe while in the presence of the attending physician.:       I,:  Bret Azul MD personally performed the services described in this documentation    as scribed in my presence.:             Angela is doing well.  The pain she is experiencing is likely due to her new found motion as she had been limited for quite some time.  She should continue to progress with physical therapy.  As her rotator cuff strength improves her shoulder pain should decrease.  I would like her to follow-up with me for 1 final visit in 6 weeks.    Subjective:   Angela Martin is a 39 y.o. female who presents postop evaluation for the right shoulder arthroscopy with lysis of adhesions, subacromial decompression and manipulation under anesthesia.  Date of surgery was 11/14/2024 placing her just under 6 weeks out.  She states she is doing fairly well.  She does complain of a burning sensation in the anterior lateral aspect of the right shoulder with abduction.  Her shoulder pain can wake her at night.  She is pleased with her progress and range of motion.  She has been attending physical therapy.  She will perform exercises at home as well.      Review of Systems   Constitutional:  Negative for chills, fever and unexpected weight change.   HENT:  Negative for hearing loss, nosebleeds and sore throat.    Eyes:  Negative for pain, redness and visual disturbance.   Respiratory:  Negative for cough, shortness of breath and wheezing.    Cardiovascular:  Negative for chest pain, palpitations and leg swelling.   Gastrointestinal:  Negative for abdominal pain, nausea and vomiting.   Endocrine: Negative for polydipsia and polyuria.   Genitourinary:  Negative for dysuria and hematuria.   Musculoskeletal:         See HPI   Skin:  Negative for rash and wound.   Neurological:   Negative for dizziness, numbness and headaches.   Psychiatric/Behavioral:  Negative for decreased concentration and suicidal ideas. The patient is not nervous/anxious.          Past Medical History:   Diagnosis Date   • ADHD (attention deficit hyperactivity disorder)    • Anemia    • KVNG (generalized anxiety disorder)    • History of transfusion 10/03/2008    Hemorrhage during child birth   • Migraines    • Miscarriage     , , ,        Past Surgical History:   Procedure Laterality Date   •  SECTION  12   • DILATION AND CURETTAGE OF UTERUS         • KIDNEY SURGERY Right     Hydronephorsis while pregnant (pardon the spelling)   • CT CYSTOURETHROSCOPY N/A 2024    Procedure: CYSTOSCOPY;  Surgeon: Mckenna Barcenas MD;  Location: [facility];  Service: Gynecology   • CT LAPS W/VAG HYSTERECT 250 GM/&RMVL TUBE&/OVARIES Bilateral 2024    Procedure: HYSTERECTOMY LAPAROSCOPIC ASSISTED VAGINAL (LAVH) WITH BILATERAL SALPINGECTOMY, EXAM UNDER ANESTHESIA;  Surgeon: Mckenna Barcenas MD;  Location: [facility];  Service: Gynecology   • CT SURGICAL ARTHROSCOPY SHOULDER W/LSS&RESCJ ADS Right 2024    Procedure: ARTHROSCOPY SHOULDER, Lysis of Adhesions, subacromial decompression, Manipulation Under Anesthesia;  Surgeon: Bret Azul MD;  Location: [facility];  Service: Orthopedics   • TUBAL LIGATION Bilateral        Family History   Problem Relation Age of Onset   • COPD Mother    • Endometriosis Mother    • Diabetes Mother    • Learning disabilities Mother    • Neurological problems Mother    • Asthma Mother    • Heart disease Mother    • Seizures Mother    • No Known Problems Father    • Learning disabilities Sister    • Neurological problems Sister    • No Known Problems Sister    • No Known Problems Sister    • No Known Problems Daughter        Social History     Occupational History   • Not on file   Tobacco Use   • Smoking status: Never     Passive exposure: Never   •  Smokeless tobacco: Never   Vaping Use   • Vaping status: Never Used   Substance and Sexual Activity   • Alcohol use: Yes     Alcohol/week: 1.0 standard drink of alcohol     Types: 1 Standard drinks or equivalent per week     Comment: Drink socially   • Drug use: Never   • Sexual activity: Yes     Partners: Male     Birth control/protection: Female Sterilization     Comment: 12 years          Current Outpatient Medications:   •  aspirin 81 mg chewable tablet, Chew 1 tablet (81 mg total) 2 (two) times a day for 28 days, Disp: 56 tablet, Rfl: 0  •  ibuprofen (MOTRIN) 600 mg tablet, Take 1 tablet (600 mg total) by mouth every 6 (six) hours as needed for moderate pain (cramping) (Patient not taking: Reported on 12/24/2024), Disp: 30 tablet, Rfl: 0  •  naproxen (Naprosyn) 500 mg tablet, Take 1 tablet (500 mg total) by mouth 2 (two) times a day with meals for 14 days, Disp: 28 tablet, Rfl: 0  •  ondansetron (ZOFRAN) 4 mg tablet, Take 1 tablet (4 mg total) by mouth every 8 (eight) hours as needed for nausea or vomiting (Patient not taking: Reported on 12/24/2024), Disp: 12 tablet, Rfl: 0  •  traMADol (Ultram) 50 mg tablet, Take 1 tablet (50 mg total) by mouth every 6 (six) hours as needed for severe pain (Patient not taking: Reported on 12/24/2024), Disp: 20 tablet, Rfl: 0    Allergies   Allergen Reactions   • Tetanus-Diphtheria Toxoids Td Swelling       Objective:  There were no vitals filed for this visit.    Right Shoulder Exam     Tenderness   The patient is experiencing no tenderness.    Range of Motion   Active abduction:  160   External rotation:  90+   Forward flexion:  170   Internal rotation 0 degrees:  L1   Internal rotation 90 degrees:  50     Muscle Strength   Abduction: 5/5   Internal rotation: 5/5   External rotation: 5/5   Supraspinatus: 5/5   Subscapularis: 5/5     Other   Erythema: absent  Scars: present  Sensation: normal  Pulse: present (2+ radial)      Left Shoulder Exam     Range of Motion    External rotation:  90              Physical Exam  Vitals reviewed.   Constitutional:       Appearance: She is well-developed.   HENT:      Head: Normocephalic and atraumatic.   Eyes:      General:         Right eye: No discharge.         Left eye: No discharge.      Conjunctiva/sclera: Conjunctivae normal.   Cardiovascular:      Rate and Rhythm: Regular rhythm.   Pulmonary:      Effort: Pulmonary effort is normal. No respiratory distress.      Breath sounds: No stridor.   Musculoskeletal:      Cervical back: Normal range of motion and neck supple.      Comments: As noted in the HPI.   Skin:     General: Skin is warm and dry.   Neurological:      Mental Status: She is alert and oriented to person, place, and time.   Psychiatric:         Behavior: Behavior normal.               This document was created using speech voice recognition software.   Grammatical errors, random word insertions, pronoun errors, and incomplete sentences are an occasional consequence of this system due to software limitations, ambient noise, and hardware issues.   Any formal questions or concerns about content, text, or information contained within the body of this dictation should be directly addressed to the provider for clarification.

## 2024-12-24 NOTE — PROGRESS NOTES
Daily Note         Today's date: 2024  Patient name: Angela Martin  : 1985  MRN: [id number]  Referring provider: Catrachito Hernandez PA-C  Dx:   Encounter Diagnosis     ICD-10-CM    1. Adhesive capsulitis of right shoulder  M75.01           Subjective: Patient reports continued shoulder burning anterior shoulder region and pain with sleeping. Reports being glad with improvements in ROM.     Objective: See treatment diary below  ROM has improved in all planes, no strength testing today    Assessment:  Patient demonstrated vast improvement in shoulder ROM. Continues to have apprehension sign and positive location relocation sign with bicipital irritation. Good tolerance to ROM exercises.     Plan: continue ROM and strengthening per patient's tolerance.       Insurance:  AMA/CMS Eval/ Re-eval POC expires Auth #/ Referral # Total    Start date  Expiration date Extension  Visit limitation?  PT only or  PT+OT? Co-Insurance   AMA 11.15.24 1.10.24  50V                      Precautions: standard, ODELL 24  Patient provided verbal consent to treatment plan and recommended interventions.    Postoperative range of motion:  Forward elevation 160 degrees  External rotation at 0 to 90 degrees  External rotation at 90 degrees to 100 degrees  Internal rotation at 90 degrees to 60 degrees    Access Code: [email or web address]  URL: [email or web address]  Date: 11/15/2024  Prepared by: Vega Leung    Exercises  - Supine Shoulder Flexion Extension AAROM with Dowel  - 4 x daily - 7 x weekly - 2 sets - 10 reps  - Supine Shoulder External Rotation 90/90 with Dowel  - 4 x daily - 7 x weekly - 2 sets - 10 reps  - Standing Shoulder Abduction AAROM with Dowel  - 4 x daily - 7 x weekly - 2 sets - 10 reps  - Standing Shoulder Extension AAROM with Dowel  - 4 x daily - 7 x weekly - 2 sets - 10 reps    Manuals 11/25 11/26 12/3/24 12/5/24 12/10/24 12/19/24 12/24   visit 7 8 9 10 11 12 13   Scap mobs BG FB AD FB FB FB DM  "  Subscap release    FB FB FB DM   A/p mobs    FB FB, inf mobs also FB DM   Neuro Re-Ed                                        Ther Ex          PROM  FB AD FB      Cane OH AAROM flexion Supine 2x10 AAROM flexion Stand 2*10  2*10, 3\" supine 2*10, 3\" supine, also 90/90 ER 3*10 2*10, 3\" supine, also 90/90 ER 3*10 2*10, 3\" supine, also 90/90 ER 3*10   pulleys  2*10 flexion 10 x 2 flexion       Shld abduction AAROM 2x10 2*10  2*10 2*10 2*10    Shld extn AAROM 2x10 2*10        Supine punch 3x10 3*10        Behind back IR 10x10\" 10*10'' W/ SOS: 10 sec x10       Behind back horz. add 2x10 2*10 10 x 2  2*10     Cross body add. 2x10 5*10''  2*10 5*10'' 5*10'' 5*10''   Mod. Sleeper stretch 5*10''   5*10''        S/L ER    3*10, 1# w/towel      rows    15.5#, 2*15      Low rows    2*15, 12.5#      B/L ER w/ flexion     3*10 RTB                         Ther Activity          Pt ed                            "

## 2024-12-30 ENCOUNTER — OFFICE VISIT (OUTPATIENT)
Dept: PHYSICAL THERAPY | Facility: CLINIC | Age: 39
End: 2024-12-30
Payer: COMMERCIAL

## 2024-12-30 DIAGNOSIS — M75.01 ADHESIVE CAPSULITIS OF RIGHT SHOULDER: Primary | ICD-10-CM

## 2024-12-30 PROCEDURE — 97140 MANUAL THERAPY 1/> REGIONS: CPT | Performed by: PHYSICAL THERAPIST

## 2024-12-30 PROCEDURE — 97110 THERAPEUTIC EXERCISES: CPT | Performed by: PHYSICAL THERAPIST

## 2024-12-30 NOTE — PROGRESS NOTES
"        Daily Note         Today's date: 2024  Patient name: Angela Martin  : 1985  MRN: [id number]  Referring provider: Catrachito Hernandez PA-C  Dx:   Encounter Diagnosis     ICD-10-CM    1. Adhesive capsulitis of right shoulder  M75.01           Subjective: Patient reports discomfort at times general shoulder region with movement.     Objective: See treatment diary below    Assessment:  Patient demonstrated near full shoulder flexion with treatment today. Full PROM ER present but lacking IR at J and behind back IR.     Plan: continue ROM and strengthening per patient's tolerance.       Insurance:  Safety[employer]/CMS Eval/ Re-eval POC expires Auth #/ Referral # Total    Start date  Expiration date Extension  Visit limitation?  PT only or  PT+OT? Co-Insurance   AMA 11.15.24 1.10.24  50V                      Precautions: standard, ODELL 24  Patient provided verbal consent to treatment plan and recommended interventions.    Postoperative range of motion:  Forward elevation 160 degrees  External rotation at 0 to 90 degrees  External rotation at 90 degrees to 100 degrees  Internal rotation at 90 degrees to 60 degrees    Access Code: [email or web address]  URL: [email or web address]  Date: 11/15/2024  Prepared by: Vega Leung    Exercises  - Supine Shoulder Flexion Extension AAROM with Dowel  - 4 x daily - 7 x weekly - 2 sets - 10 reps  - Supine Shoulder External Rotation 90/90 with Dowel  - 4 x daily - 7 x weekly - 2 sets - 10 reps  - Standing Shoulder Abduction AAROM with Dowel  - 4 x daily - 7 x weekly - 2 sets - 10 reps  - Standing Shoulder Extension AAROM with Dowel  - 4 x daily - 7 x weekly - 2 sets - 10 reps    Manuals 12/5/24 12/10/24 12/19/24 12/24 12/30   visit 10 11 12 13 14   Scap mobs FB FB FB DM FB   Subscap release FB FB FB DM FB   A/p mobs FB FB, inf mobs also FB DM FB   Neuro Re-Ed                                Ther Ex        PROM FB    FB   Cane OH AAROM flexion 2*10, 3\" supine 2*10, 3\" supine, " "also 90/90 ER 3*10 2*10, 3\" supine, also 90/90 ER 3*10 2*10, 3\" supine, also 90/90 ER 3*10    pulleys        Shld abduction AAROM 2*10 2*10 2*10     Shld extn AAROM        Supine punch        Behind back IR     W/ SOS: 10*10''   Behind back horz. add  2*10   2*10   Cross body add. 2*10 5*10'' 5*10'' 5*10''    Mod. Sleeper stretch     5*10''   S/L ER 3*10, 1# w/towel       rows 15.5#, 2*15       Low rows 2*15, 12.5#       B/L ER w/ flexion  3*10 RTB                      Ther Activity        Pt ed                        "

## 2025-01-06 ENCOUNTER — APPOINTMENT (OUTPATIENT)
Dept: PHYSICAL THERAPY | Facility: CLINIC | Age: 40
End: 2025-01-06
Payer: COMMERCIAL

## 2025-01-06 ENCOUNTER — OFFICE VISIT (OUTPATIENT)
Dept: PHYSICAL THERAPY | Facility: CLINIC | Age: 40
End: 2025-01-06
Payer: COMMERCIAL

## 2025-01-06 DIAGNOSIS — M75.01 ADHESIVE CAPSULITIS OF RIGHT SHOULDER: Primary | ICD-10-CM

## 2025-01-06 PROCEDURE — 97140 MANUAL THERAPY 1/> REGIONS: CPT

## 2025-01-06 PROCEDURE — 97110 THERAPEUTIC EXERCISES: CPT

## 2025-01-06 NOTE — PROGRESS NOTES
Daily Note     Today's date: 2025  Patient name: Angela Martin  : 1985  MRN: [id number]  Referring provider: Catrachito Hernandez PA-C  Dx:   Encounter Diagnosis     ICD-10-CM    1. Adhesive capsulitis of right shoulder  M75.01           Subjective: Patient reports feeling okay but still very stiff and painful.     Objective: See treatment diary below    Assessment:  Patient tolerated ROM exercises well today. Most painful is flexion and IR/ER. Pt's ROM continued to improve throughout the session. Continue progressing her regarding her shoulder mobility, strengthening, and function as she can tolerate per her primary PT.     Plan: continue ROM and strengthening per patient's tolerance.       Insurance:  AMA/CMS Eval/ Re-eval POC expires Auth #/ Referral # Total    Start date  Expiration date Extension  Visit limitation?  PT only or  PT+OT? Co-Insurance   AMA 11.15.24 1.10.24  50V                      Precautions: standard, ODELL 24  Patient provided verbal consent to treatment plan and recommended interventions.    Postoperative range of motion:  Forward elevation 160 degrees  External rotation at 0 to 90 degrees  External rotation at 90 degrees to 100 degrees  Internal rotation at 90 degrees to 60 degrees    Access Code: [email or web address]  URL: [email or web address]  Date: 11/15/2024  Prepared by: Vega Leung    Exercises  - Supine Shoulder Flexion Extension AAROM with Dowel  - 4 x daily - 7 x weekly - 2 sets - 10 reps  - Supine Shoulder External Rotation 90/90 with Dowel  - 4 x daily - 7 x weekly - 2 sets - 10 reps  - Standing Shoulder Abduction AAROM with Dowel  - 4 x daily - 7 x weekly - 2 sets - 10 reps  - Standing Shoulder Extension AAROM with Dowel  - 4 x daily - 7 x weekly - 2 sets - 10 reps    Manuals 12/10/24 12/19/24 12/24 12/30 1/6/25   visit 11 12 13 14 15   Scap mobs FB FB DM FB WM   Subscap release FB FB DM FB WM   A/p mobs FB, inf mobs also FB DM FB WM   Neuro Re-Ed            "                     Ther Ex        PROM    FB WM   Cane OH AAROM flexion 2*10, 3\" supine, also 90/90 ER 3*10 2*10, 3\" supine, also 90/90 ER 3*10 2*10, 3\" supine, also 90/90 ER 3*10     Shld abduction AAROM 2*10 2*10      Shld extn AAROM        Supine punch        Behind back IR    W/ SOS: 10*10'' SOS 3x10   Behind back horz. add 2*10   2*10    Cross body add. 5*10'' 5*10'' 5*10''     Mod. Sleeper stretch    5*10'' 10x15\"   S/L ER        rows        Low rows        B/L ER w/ flexion 3*10 RTB                       Ther Activity        Pt ed                        "

## 2025-01-08 ENCOUNTER — CONSULT (OUTPATIENT)
Age: 40
End: 2025-01-08
Payer: COMMERCIAL

## 2025-01-08 VITALS
HEIGHT: 62 IN | DIASTOLIC BLOOD PRESSURE: 80 MMHG | OXYGEN SATURATION: 96 % | HEART RATE: 84 BPM | BODY MASS INDEX: 24.66 KG/M2 | SYSTOLIC BLOOD PRESSURE: 100 MMHG | WEIGHT: 134 LBS

## 2025-01-08 DIAGNOSIS — F90.9 ADHD: ICD-10-CM

## 2025-01-08 DIAGNOSIS — G31.84 MCI (MILD COGNITIVE IMPAIRMENT): Primary | ICD-10-CM

## 2025-01-08 PROCEDURE — 99205 OFFICE O/P NEW HI 60 MIN: CPT | Performed by: PSYCHIATRY & NEUROLOGY

## 2025-01-08 RX ORDER — LISDEXAMFETAMINE DIMESYLATE 20 MG/1
20 CAPSULE ORAL EVERY MORNING
Qty: 30 CAPSULE | Refills: 0 | Status: SHIPPED | OUTPATIENT
Start: 2025-01-08

## 2025-01-08 NOTE — PROGRESS NOTES
Outpatient Neurology History and Physical  Angela Martin  [id number]  39 y.o.  1985          Consult: Yes    Abodulaye Alexander MD      Chief Complaint   Patient presents with   • Memory Loss           History Obtained from: patient     HPI:       Angela Martin is a 38 yo F with PMH of ADHD presents to establish care for her memory concerns. She has noticed short term memory impairment. She would write lists but will soon forget what she needs to get. Her children have noted that she tends to forget easily. Patient has h/o ADHD most of her life. She hasn't been on any therapy for past 2 years due to not having regular provider to f/u with. She had been on Adderall for a month only. She had palpitations and couldn't continue. She was supposed to try Vyvanse but that was never sent.   All of her children and siblings have ADD but she was never diagnosed during childhood.     She had MRI brain w/o contrast in 2021 and it was negative.     Her Vit D and B12 were checked. She was borderline low with Vit D and was taking supplements.     As for memory, most she will play is sudoku.   Her current main stress at home is her sister in law living with her who has physical and cognitive disabilities.     She's had EEG in past in [place] and it was normal.   She has h/o migraines but is going to have hysterectomy and they may subside after that.   She had side effects to imitrex injection.     Patient denies any h/o early onset Alzheimer's.         Past Medical History:   Diagnosis Date   • ADHD (attention deficit hyperactivity disorder)    • Anemia    • KVNG (generalized anxiety disorder)    • History of transfusion 10/03/2008    Hemorrhage during child birth   • Memory loss    • Migraines    • Miscarriage     2007, 2008, 2010, 2011               Current Outpatient Medications on File Prior to Visit   Medication Sig Dispense Refill   • aspirin 81 mg chewable tablet Chew 1 tablet (81 mg total) 2 (two) times a day for 28 days  56 tablet 0   • ibuprofen (MOTRIN) 600 mg tablet Take 1 tablet (600 mg total) by mouth every 6 (six) hours as needed for moderate pain (cramping) (Patient not taking: Reported on 2024) 30 tablet 0   • naproxen (Naprosyn) 500 mg tablet Take 1 tablet (500 mg total) by mouth 2 (two) times a day with meals for 14 days 28 tablet 0   • ondansetron (ZOFRAN) 4 mg tablet Take 1 tablet (4 mg total) by mouth every 8 (eight) hours as needed for nausea or vomiting (Patient not taking: Reported on 2024) 12 tablet 0   • traMADol (Ultram) 50 mg tablet Take 1 tablet (50 mg total) by mouth every 6 (six) hours as needed for severe pain (Patient not taking: Reported on 2024) 20 tablet 0     No current facility-administered medications on file prior to visit.       Allergies   Allergen Reactions   • Tetanus-Diphtheria Toxoids Td Swelling         Family History   Problem Relation Age of Onset   • COPD Mother    • Endometriosis Mother    • Diabetes Mother    • Learning disabilities Mother    • Neurological problems Mother    • Asthma Mother    • Heart disease Mother    • Seizures Mother    • No Known Problems Father    • Learning disabilities Sister    • Neurological problems Sister    • No Known Problems Sister    • No Known Problems Sister    • No Known Problems Daughter                 Past Surgical History:   Procedure Laterality Date   •  SECTION  12   • DILATION AND CURETTAGE OF UTERUS         • KIDNEY SURGERY Right     Hydronephorsis while pregnant (pardon the spelling)   • [place] CYSTOURETHROSCOPY N/A 2024    Procedure: CYSTOSCOPY;  Surgeon: Mckenna Barcenas MD;  Location: [facility];  Service: Gynecology   • [facility] LAPS W/VAG HYSTERECT 250 GM/&RMVL TUBE&/OVARIES Bilateral 2024    Procedure: HYSTERECTOMY LAPAROSCOPIC ASSISTED VAGINAL (LAVH) WITH BILATERAL SALPINGECTOMY, EXAM UNDER ANESTHESIA;  Surgeon: Mckenna Barcenas MD;  Location: [facility];  Service: Gynecology   • [facility] SURGICAL  ARTHROSCOPY SHOULDER W/LSS&RESCJ ADS Right 11/14/2024    Procedure: ARTHROSCOPY SHOULDER, Lysis of Adhesions, subacromial decompression, Manipulation Under Anesthesia;  Surgeon: Bret Azul MD;  Location[facility];  Service: Orthopedics   • TUBAL LIGATION Bilateral 2012           Social History     Socioeconomic History   • Marital status: /Civil Union     Spouse name: Not on file   • Number of children: Not on file   • Years of education: Not on file   • Highest education level: Not on file   Occupational History   • Not on file   Tobacco Use   • Smoking status: Never     Passive exposure: Never   • Smokeless tobacco: Never   Vaping Use   • Vaping status: Never Used   Substance and Sexual Activity   • Alcohol use: Yes     Alcohol/week: 1.0 standard drink of alcohol     Types: 1 Standard drinks or equivalent per week     Comment: Drink socially   • Drug use: Never   • Sexual activity: Yes     Partners: Male     Birth control/protection: Female Sterilization     Comment: 12 years    Other Topics Concern   • Not on file   Social History Narrative   • Not on file     Social Drivers of Health     Financial Resource Strain: Not on file   Food Insecurity: Not on file   Transportation Needs: Not on file   Physical Activity: Not on file   Stress: Not on file   Social Connections: Not on file   Intimate Partner Violence: Not At Risk (8/17/2023)    Received from [facility] [employer]    • Threatened: Not on file    • Insulted: Not on file    • Physically Hurt : Not on file    • Scream: Not on file   Housing Stability: At Risk (8/17/2023)    Received from [facility]    • Living Situation: Not on file    • Housing Problems: Not on file       Review of Systems  Refer to positive review of systems in HPI  Constitutional- No fever  Eyes- No visual change  ENT- Hearing normal  CV- No chest pain  Resp- No Shortness of breath  GI- No diarrhea  - Bladder  "normal  MS- No Arthritis   Skin- No rash  Psych- No depression  Endo- No DM  Heme- No nodes    PHYSICAL EXAM:    Vitals:    01/08/25 1232   BP: 100/80   Patient Position: Sitting   Cuff Size: Adult   Pulse: 84   SpO2: 96%   Weight: 60.8 kg (134 lb)   Height: 5' 2\" (1.575 m)         Appearance: No Acute Distress  Ophthalmoscopic: Disc Flat, Normal fundus  Carotid/Heart/Peripheral Vascular: No Bruits, RRR  Orientation: Awake, Alert, and Oriented x 3  Mental status:  Memory: MOCA: 25/30.   Attention: Normal  Knowledge: Appropriate  Language: No aphasia  Speech: No dysarthria  Cranial Nerves:  2 No Visual Defect on Confrontation; Pupils round, equal, reactive to light  3,4,6 Extraocular Movements Intact; no nystagmus  5 Facial Sensation Intact  7 No facial asymmetry  8 Intact hearing  9,10 Palate symmetric, normal gag  11 Good shoulder shrug  12 Tongue Midline  Gait: Stable, No ataxia, can perform tandem walking  Coordination: No ataxia with finger to nose testing and heel to shin testing  Sensory: Intact, Symmetric to Pinprick, Light Touch, Vibration, and Joint Position  Muscle Tone: Normal  Muscle exam  Arm Right Left Leg Right Left   Deltoid 5/5 5/5 Iliopsoas 5/5 5/5   Biceps 5/5 5/5 Quads 5/5 5/5   Triceps 5/5 5/5 Hamstrings 5/5 5/5   Wrist Extension 5/5 5/5 Ankle Dorsi Flexion 5/5 5/5   Wrist Flexion 5/5 5/5 Ankle Plantar Flexion 5/5 5/5   Interossei 5/5 5/5 Ankle Eversion 5/5 5/5   APB 5/5 5/5 Ankle Inversion 5/5 5/5       Reflexes   RJ BJ TJ KJ AJ Plantars Mcdonough's   Right 2+ 2+ 2+ brisk 2+ Downgoing Not present   Left 2+ 2+ 2+ brisk 2+ Downgoing Not present       Personal review of          Mri brain from 2021: normal.     Assessment/Plan:     Assessment & Plan  MCI (mild cognitive impairment)    Orders:  •  Ambulatory referral to Neuropsychology; Future  I think her memory problem is from underlying stresses at home.   Asked her to engage in various cognitive exercises and eating healthy.  Will obtain " neuropsych testing.   ADHD    Orders:  •  lisdexamfetamine (VYVANSE) 20 MG capsule; Take 1 capsule (20 mg total) by mouth every morning Max Daily Amount: 20 mg    Will adjust dose as needed.                 Counseling Documentation:  The patient and/or patient's family were  counseled regarding diagnostic results. Instructions for management,risk factor reductions,prognosis of disease were discussed. Patient and family were educated regarding impressions,risks and benefits of treatment options,importance of compliance with treatment.        Total time of encounter: 60 min   More than 50% of time was spent in counseling and coordination of care of patient.     Rigo Wilson M.D.  [facility]  [address] [place] [id number]

## 2025-01-09 ENCOUNTER — OFFICE VISIT (OUTPATIENT)
Dept: PHYSICAL THERAPY | Facility: CLINIC | Age: 40
End: 2025-01-09
Payer: COMMERCIAL

## 2025-01-09 DIAGNOSIS — M75.01 ADHESIVE CAPSULITIS OF RIGHT SHOULDER: Primary | ICD-10-CM

## 2025-01-09 PROCEDURE — 97110 THERAPEUTIC EXERCISES: CPT | Performed by: PHYSICAL THERAPIST

## 2025-01-09 PROCEDURE — 97140 MANUAL THERAPY 1/> REGIONS: CPT | Performed by: PHYSICAL THERAPIST

## 2025-01-09 NOTE — PROGRESS NOTES
Daily Note     Today's date: 2025  Patient name: Angela Martin  : 1985  MRN: [id number]  Referring provider: Catrachito Hernandez PA-C  Dx:   Encounter Diagnosis     ICD-10-CM    1. Adhesive capsulitis of right shoulder  M75.01           Subjective: Patient reports still having trouble reaching up her back.     Objective: See treatment diary below    Assessment:  Patient treported feeling better with shoulder flexion with scapular assistance. Added s/L shoulder punching to emphasize scapular upward rotation. HEP consisting of shoulder extension with IR, punch in S/L and sleeper stretch.    Plan: continue ROM and strengthening per patient's tolerance.       Insurance:  AMA/CMS Eval/ Re-eval POC expires Auth #/ Referral # Total    Start date  Expiration date Extension  Visit limitation?  PT only or  PT+OT? Co-Insurance   AMA 11.15.24 1.10.24  50V                      Precautions: standard, ODELL 24  Patient provided verbal consent to treatment plan and recommended interventions.    Postoperative range of motion:  Forward elevation 160 degrees  External rotation at 0 to 90 degrees  External rotation at 90 degrees to 100 degrees  Internal rotation at 90 degrees to 60 degrees    Access Code: [email or web address]  URL: [email or web address]  Date: 11/15/2024  Prepared by: Vega Leung    Exercises  - Supine Shoulder Flexion Extension AAROM with Dowel  - 4 x daily - 7 x weekly - 2 sets - 10 reps  - Supine Shoulder External Rotation 90/90 with Dowel  - 4 x daily - 7 x weekly - 2 sets - 10 reps  - Standing Shoulder Abduction AAROM with Dowel  - 4 x daily - 7 x weekly - 2 sets - 10 reps  - Standing Shoulder Extension AAROM with Dowel  - 4 x daily - 7 x weekly - 2 sets - 10 reps    Manuals 24   visit 12 13 14 15 16   Scap mobs FB DM FB WM FB   Subscap release FB DM FB WM FB   A/p mobs FB DM FB WM FB   Neuro Re-Ed                                Ther Ex        PROM   FB WM FB  "  [place]ane [place] AAROM flexion 2*10, 3\" supine, also 90/90 ER 3*10 2*10, 3\" supine, also 90/90 ER 3*10      Shld abduction AAROM 2*10       Shld extn w/IR     3*10   Supine punch     2*10 S/L   Behind back IR   W/ SOS: 10*10'' SOS 3x10    Cross body add. 5*10'' 5*10''      Mod. Sleeper stretch   5*10'' 10x15\"    S/L ER        rows        Low rows        B/L ER     2*10 RTB                   Ther Activity        Pt ed                        "

## 2025-01-11 ENCOUNTER — PATIENT MESSAGE (OUTPATIENT)
Dept: OBGYN CLINIC | Facility: CLINIC | Age: 40
End: 2025-01-11

## 2025-01-15 ENCOUNTER — NURSE TRIAGE (OUTPATIENT)
Age: 40
End: 2025-01-15

## 2025-01-15 NOTE — TELEPHONE ENCOUNTER
"Regarding: incision issues  ----- Message from Ileana BAGLEY sent at 1/15/2025 12:05 PM EST -----  Patient wrote back in her incision keeps opening     \"It stopped (bleeding) and was resolving by next day. My right side where glued is still open and keeps opening. I had a bandage on it for a few days to stop it from sticking to my clothes but then it just stuck to that instead. Left glue side good, belly button glue part just a bit of a scab at this point. Any tips to get my right side to scab up? \"    "

## 2025-01-15 NOTE — TELEPHONE ENCOUNTER
"Returned call to patient regarding ongoing [employer] messages about Right side incision.  She is wondering if there is anything she can do to heal it faster.  She reports she sleeps on this side so it is the last incision to close.  She denies bleeding from the site, but just a small amount of \"sticky\" drainage.  She is using bandaids and keeping the site clean and dry.  Encouraged patient to continue these interventions and call back if symptoms worsen.      Reason for Disposition   Sutured or stapled surgical incision, questions about    Answer Assessment - Initial Assessment Questions  1. SYMPTOM: \"What's the main symptom you're concerned about?\" (e.g., drainage, incision opened up, pain, redness)      Patient wanting tips on how to heal incision faster. Patient has minimal drainage  2. ONSET: \"When did drainage  start?\"      Incision from surgery 1 month ago  3. SURGERY: \"What surgery did you have?\"      HYSTERECTOMY LAPAROSCOPIC ASSISTED VAGINAL (LAVH) WITH BILATERAL SALPINGECTOMY  4. DATE of SURGERY: \"When was the surgery?\"       12/11/24  5. INCISION SITE: \"Where is the incision located?\"       Right side abdomen  6. REDNESS: \"Is there any redness at the incision site?\" If Yes, ask: \"How wide across is the redness?\" (Inches, centimeters)       denies  7. PAIN: \"Is there any pain?\" If Yes, ask: \"How bad is it?\"  (Scale 1-10; or mild, moderate, severe)      Denies-minimal  8. BLEEDING: \"Is there any bleeding?\" If Yes, ask: \"How much?\" and \"Where?\"      denies  9. DRAINAGE: \"Is there any drainage from the incision site?\" If Yes, ask: \"What color and how much?\" (e.g., red, cloudy, pus; drops, teaspoon)      Minimal \"sticky\"  10. FEVER: \"Do you have a fever?\" If Yes, ask: \"What is your temperature, how was it measured, and when did it start?\"        denies  11. OTHER SYMPTOMS: \"Do you have any other symptoms?\" (e.g., dizziness, rash elsewhere on body, shaking chills, weakness)        denies    Protocols used: " Post-Op Incision Symptoms and Questions-Adult-[place]

## 2025-01-30 ENCOUNTER — TELEPHONE (OUTPATIENT)
Age: 40
End: 2025-01-30

## 2025-01-30 NOTE — TELEPHONE ENCOUNTER
Contacted patient off of [facility]  to verify needs of services in attempts to offer patient an appointment. spoke with patient whom stated they are no longer interested in services

## 2025-01-31 ENCOUNTER — OFFICE VISIT (OUTPATIENT)
Dept: OBGYN CLINIC | Facility: CLINIC | Age: 40
End: 2025-01-31

## 2025-01-31 VITALS
HEIGHT: 62 IN | DIASTOLIC BLOOD PRESSURE: 68 MMHG | BODY MASS INDEX: 24.55 KG/M2 | WEIGHT: 133.4 LBS | SYSTOLIC BLOOD PRESSURE: 110 MMHG

## 2025-01-31 DIAGNOSIS — M62.89 HIGH-TONE PELVIC FLOOR DYSFUNCTION IN FEMALE: ICD-10-CM

## 2025-01-31 DIAGNOSIS — N39.3 SUI (STRESS URINARY INCONTINENCE, FEMALE): Primary | ICD-10-CM

## 2025-01-31 PROCEDURE — 99204 OFFICE O/P NEW MOD 45 MIN: CPT | Performed by: OBSTETRICS & GYNECOLOGY

## 2025-01-31 NOTE — PROGRESS NOTES
Urogynecology - New Patient Visit  Angela Martin   [id number]    Romansh-speaking: no Phone  used: no    Chief complaint: LUIS ALFREDO    HPI: 39 y.o. presents for symptoms of LUIS ALFREDO. She is recently s/p LAVH, BS, cysto on 24 for AUB and iron-deficiency anemia. Reports overall feeling well since her surgery. Patient reports LUIS ALFREDO symptoms for years. She does not wear pads, but works from home and often is changing her underwear. Leakage amount varies from small to large splashes. She can no longer run secondary to her LUIS ALFREDO. She denies OAB symptoms. Denies POP symptoms. She was told she had some prolapse by her primary GYN but is asymptomatic. She does report dyspareunia with deep penetration. States her primary GYN had discussed PFPT for her, but she has not done this.      Referred by gynecologist:  yes  History of prolapse surgery: no  Primary care doctor: yes    Prolapse symptoms  Bulge: no   Pressure: no  Rubbing on clothing: no  Stenting for urine: no  Stenting for stool: no  Pessary use: no   Hormonal replacement therapy: n/a    Urinary symptoms  Urgency: no  Frequency: no   Incontinence with stress (exercise, valsalva, laugh, sneeze, cough): yes  Incontinence with urge: no  #pads used/24 hours: does not wear  Postural incontinence: no  Nocturia:no   Dysuria: no  Hematuria:no  Incomplete emptying: yes  Slow stream:no  Hesitancy: no  Straining with urination: no    Defecatory symptoms  Constipation:yes  Frequency: 1/week,   Urgency: no  Fecal Incontinence: no  Gas incontinence:  no  Loose stools:  no  Bowel regimen:  no, maybe takes Vitamin E if gets constipated     Gynecologic history  Pap history: Normal Year: 2023  Sexually active: yes - not currently 2/2 pelvic rest after LAVH  Dyspareunia: yes, deep penetration.    OB History    Para Term  AB Living   5 2 2  3 2   SAB IAB Ectopic Multiple Live Births   3    2      # Outcome Date GA Lbr Socrates/2nd Weight Sex Type Anes PTL Lv   5 Term     M  Vag-Spont   ROD   4 Term     F CS-Unspec   ROD   3 SAB            2 SAB            1 SAB               Obstetric Comments   First trimester miscarriages        Past Medical History:   Diagnosis Date    ADHD (attention deficit hyperactivity disorder)     Anemia     KVNG (generalized anxiety disorder)     History of transfusion 10/03/2008    Hemorrhage during child birth    Memory loss     Migraines     Miscarriage     , , ,        Past Surgical History:   Procedure Laterality Date     SECTION  12    DILATION AND CURETTAGE OF UTERUS      2005    KIDNEY SURGERY Right     Hydronephorsis while pregnant (pardon the spelling)    OR CYSTOURETHROSCOPY N/A 2024    Procedure: CYSTOSCOPY;  Surgeon: Mckenna Barcenas MD;  Location: [facility];  Service: Gynecology    OR LAPS W/VAG HYSTERECT 250 GM/&RMVL TUBE&/OVARIES Bilateral 2024    Procedure: HYSTERECTOMY LAPAROSCOPIC ASSISTED VAGINAL (LAVH) WITH BILATERAL SALPINGECTOMY, EXAM UNDER ANESTHESIA;  Surgeon: Mckenna Barcenas MD;  Location: [facility];  Service: Gynecology    OR SURGICAL ARTHROSCOPY SHOULDER W/LSS&RESCJ ADS Right 2024    Procedure: ARTHROSCOPY SHOULDER, Lysis of Adhesions, subacromial decompression, Manipulation Under Anesthesia;  Surgeon: Bret Azul MD;  Location: [facility];  Service: Orthopedics    TUBAL LIGATION Bilateral        Family History   Problem Relation Age of Onset    COPD Mother     Endometriosis Mother     Diabetes Mother     Learning disabilities Mother     Neurological problems Mother     Asthma Mother     Heart disease Mother     Seizures Mother     No Known Problems Father     Learning disabilities Sister     Neurological problems Sister     No Known Problems Sister     No Known Problems Sister     No Known Problems Daughter        Social History     Socioeconomic History    Marital status: /Civil Union     Spouse name: None    Number of children: None    Years of education: None     Highest education level: None   Occupational History    None   Tobacco Use    Smoking status: Never     Passive exposure: Never    Smokeless tobacco: Never   Vaping Use    Vaping status: Never Used   Substance and Sexual Activity    Alcohol use: Not Currently     Alcohol/week: 1.0 standard drink of alcohol     Types: 1 Standard drinks or equivalent per week     Comment: Drink socially    Drug use: Never    Sexual activity: Yes     Partners: Male     Birth control/protection: Female Sterilization     Comment: 12 years    Other Topics Concern    None   Social History Narrative    None     Social Drivers of Health     Financial Resource Strain: Low Risk  (1/31/2025)    Overall Financial Resource Strain (CARDIA)     Difficulty of Paying Living Expenses: Not hard at all   Food Insecurity: No Food Insecurity (1/31/2025)    Hunger Vital Sign     Worried About Running Out of Food in the Last Year: Never true     Ran Out of Food in the Last Year: Never true   Transportation Needs: No Transportation Needs (1/31/2025)    PRAPARE - Transportation     Lack of Transportation (Medical): No     Lack of Transportation (Non-Medical): No   Physical Activity: Not on file   Stress: Not on file   Social Connections: Not on file   Intimate Partner Violence: Not At Risk (8/17/2023)    Received from [employer], [facility]     Threatened: Not on file     Insulted: Not on file     Physically Hurt : Not on file     Scream: Not on file   Housing Stability: Low Risk  (1/31/2025)    Housing Stability Vital Sign     Unable to Pay for Housing in the Last Year: No     Number of Times Moved in the Last Year: 0     Homeless in the Last Year: No       Current Outpatient Medications on File Prior to Visit   Medication Sig Dispense Refill    aspirin 81 mg chewable tablet Chew 1 tablet (81 mg total) 2 (two) times a day for 28 days 56 tablet 0    ibuprofen (MOTRIN) 600 mg tablet Take 1 tablet (600 mg total) by mouth every 6  "(six) hours as needed for moderate pain (cramping) (Patient not taking: Reported on 12/24/2024) 30 tablet 0    lisdexamfetamine (VYVANSE) 20 MG capsule Take 1 capsule (20 mg total) by mouth every morning Max Daily Amount: 20 mg 30 capsule 0    naproxen (Naprosyn) 500 mg tablet Take 1 tablet (500 mg total) by mouth 2 (two) times a day with meals for 14 days 28 tablet 0    ondansetron (ZOFRAN) 4 mg tablet Take 1 tablet (4 mg total) by mouth every 8 (eight) hours as needed for nausea or vomiting (Patient not taking: Reported on 12/24/2024) 12 tablet 0    traMADol (Ultram) 50 mg tablet Take 1 tablet (50 mg total) by mouth every 6 (six) hours as needed for severe pain (Patient not taking: Reported on 12/20/2024) 20 tablet 0     No current facility-administered medications on file prior to visit.       Allergies   Allergen Reactions    Tetanus-Diphtheria Toxoids Td Swelling     ROS:  Pertinent items are noted in HPI.    Physical exam:  /68 (BP Location: Left arm, Patient Position: Sitting)   Ht 5' 2\" (1.575 m)   Wt 60.5 kg (133 lb 6.4 oz)   LMP 12/01/2024 (Approximate)   BMI 24.40 kg/m²   Cardiovascular: regular rate and rhythm  Lungs:  clear to auscultation bilaterally  Abdomen: soft, non-tender, without masses or organomegaly  Pelvic: BUS normal. Introitus normal. Normal appearing vaginal epithelium, Uterus surgically absent., Post hysterectomy status, vaginal cuff well healed. Vicryl suture knots present at bilateral corners of incision. Hypertonic levators and puborectalis on exam. Tenderness to palpation on L puborectalis.   Extremities: No edema and nontender  Neurologic:  alert, oriented, normal speech, no focal findings or movement disorder noted     Vulvovaginal atrophy:  no   Urethral caruncle:  no     POP-Q   Aa 0  Ba -0.5  C  -5  Ap -1  Bp -2  D  X  GH 4  PB  3  TVL  9    Kegel strength: 3.5/5    Q-tip test: Resting degree of 0 with straining degree of 70  Q-tip with decreased resistance: " no      Assessment:  39 y.o. with LUIS ALFREDO and hypertonic pelvic floor muscles.     Plan:  Surgery: yes Procedure: EUA, pubovaginal sling, cystoscopy, injection of pelvic floor botox    I discussed the risks of surgery at length with the patient including bleeding, infection, injury to surrounding structures (bowel/bladder/ureter), and risks related to mesh augmentation specifically: mesh exposure, mesh erosion, dyspareunia, and pelvic pain. I discussed that these risks are low < 5% with optimal surgical technique and placement of the mesh.  I discussed that in the event of a mesh exposure often these are either treated conservatively or with an outpatient partial excision only of the exposed mesh and that complete excision of the mesh is rarely indicated.      I discussed the risks related to the pubovaginal sling including persistent incontinence or voiding dysfunction along with risks related to mesh. We discussed that unforeseen events can occur during or after surgery such as anesthesia complications, heart attack, stroke or excessive blood loss that could lead to permanent disability or death.  We reviewed risks of possible blood tranfusions, including allergic reaction, chest pain, shortness of breath, and infectious risk (HIV/hepatitis). Alternatives to recommended procedure, including Kegel exercises, timed voiding, pessary, etc were also discussed along with their risks and benefits.  She was given an opportunity to ask questions which were answered to her satisfaction.   She understood the information presented to her and wished to proceed with the recommended procedure. Written informed consent was obtained.     Discussed PF botox for treatment of hypertonicity of pelvic floor. Discussed expected interval of relief after injections. Counseled on mixed data supporting pelvic floor botox, however, could provide some relief. Patient would like to proceed with PF botox injections at time of PV sling.     Patient  does have a cystocele on exam, however, this is asymptomatic. Would not recommend addressing surgically at this time. Recommend daily Kegel exercises    Follow up postop  Preoperative visit: no  Preoperative clearance: no    Over 50% of time spent on counseling and coordination of care.    Rabia Ryan MD  01/31/25  10:01 AM

## 2025-02-02 ENCOUNTER — TELEPHONE (OUTPATIENT)
Dept: OBGYN CLINIC | Facility: CLINIC | Age: 40
End: 2025-02-02

## 2025-02-02 NOTE — TELEPHONE ENCOUNTER
----- Message from Rabia Ryan MD sent at 2025 10:48 AM EST -----  Regarding: surgery schedule  [facility] Department  Surgery Scheduling Sheet    Patient Name: Angela Martin  : 1985    Provider: Rabia Ryan MD     Needed: no; Language: N/A    Procedure: exam under anesthesia, insertion of pubovaginal sling, cystoscopy, injection of pelvic floor botox    Diagnosis: stress urinary incontinence, myofasical pain disorder, high tonicity of pelvic floor    Special Needs or Equipment: none    Anesthesia: IV sedation with anesthesia    Length of stay: outpatient  Does patient have comorbid conditions that will require close perioperative monitoring prior to safe discharge: no    The patient has comorbid conditions that will require close perioperative monitoring prior to safe discharge, including N/A.   This may require acute care beyond the usual and routine recovery period. As such, inpatient admission post-operatively is expected and appropriate, and anticipated hospital length of stay will be >2 midnights.    Pre-Admission Testing Needed: no   Labs that should be ordered: none    Order PAT that is recommended in prep for procedure?: Not Indicated    Medical Clearance Needed: no; Provider: N/A    MA Form Signed (tubals/hysterectomy): Not Indicated    Surgical Drink Given: no     How many days out of work: 3 day(s)     How many days no driving: 3 day(s)       Is pre op appt needed?  no  Interval for post op appt: 2 week(s)       For Surgical Scheduler:     Surgery Scheduled On:  [place]: [facility]    Pre-op Appt:   Post op Appt:  Consult/Medical clearance appt:

## 2025-02-04 ENCOUNTER — OFFICE VISIT (OUTPATIENT)
Dept: URGENT CARE | Facility: CLINIC | Age: 40
End: 2025-02-04
Payer: COMMERCIAL

## 2025-02-04 VITALS
DIASTOLIC BLOOD PRESSURE: 60 MMHG | SYSTOLIC BLOOD PRESSURE: 110 MMHG | HEIGHT: 62 IN | HEART RATE: 98 BPM | TEMPERATURE: 98.1 F | OXYGEN SATURATION: 98 % | WEIGHT: 135.4 LBS | RESPIRATION RATE: 18 BRPM | BODY MASS INDEX: 24.92 KG/M2

## 2025-02-04 DIAGNOSIS — R05.1 ACUTE COUGH: ICD-10-CM

## 2025-02-04 DIAGNOSIS — J06.9 UPPER RESPIRATORY TRACT INFECTION, UNSPECIFIED TYPE: Primary | ICD-10-CM

## 2025-02-04 PROCEDURE — 87636 SARSCOV2 & INF A&B AMP PRB: CPT

## 2025-02-04 PROCEDURE — 99213 OFFICE O/P EST LOW 20 MIN: CPT

## 2025-02-04 RX ORDER — OSELTAMIVIR PHOSPHATE 75 MG/1
75 CAPSULE ORAL EVERY 12 HOURS SCHEDULED
Qty: 10 CAPSULE | Refills: 0 | Status: SHIPPED | OUTPATIENT
Start: 2025-02-04 | End: 2025-02-09

## 2025-02-05 LAB
FLUAV RNA RESP QL NAA+PROBE: POSITIVE
FLUBV RNA RESP QL NAA+PROBE: NEGATIVE
SARS-COV-2 RNA RESP QL NAA+PROBE: NEGATIVE

## 2025-02-05 NOTE — H&P (VIEW-ONLY)
[facility]        NAME: Angela Martin is a 39 y.o. female  : 1985    MRN: [id number]  DATE: 2025  TIME: 7:31 PM    Assessment and Plan   Upper respiratory tract infection, unspecified type [J06.9]  1. Upper respiratory tract infection, unspecified type        2. Acute cough  Covid19 and INFLUENZA A/B PCR        COVID/flu PCR sent to lab, will f/u if positive. Suspect viral illness given clinical presentation. Patient electing to start Tamiflu en niki of results. Advised to stop taking if negative for flu. VSS in clinic, appears in no acute distress. Educated on use of OTC products for additional relief of symptoms. Advised close follow-up with PCP or to report to the ER if symptoms worsen. Patient verbalizes understanding and agreeable to plan.      Patient Instructions      Start tamiflu today - if negative for flu, stop taking medication. Symptoms of a viral infection may linger for up to 10 days. Your contagious period is the first 5-7 days of symptom onset. Continue over-the-counter products for symptoms: tylenol for fevers, ibuprofen for body aches, sudafed and flonase (fluticasone) with nasal saline for congestion, mucinex for cough, and airborne/emergen-c for vitamin supplementation. Follow-up with PCP in 3-5 days. Report to ER if symptoms worsen.      Chief Complaint     Chief Complaint   Patient presents with    Cold Like Symptoms    Cough     Son diagnosed with Flu yesterday. She started with mild s/s last night - HA, body aches, dizziness, dry cough. Taking Advil - last dose at 2 pm.          History of Present Illness       39 year old female presents for evaluation of headache, body aches, cough, and congestion x 1 day. She reports being exposed to the flu over the weekend. She denies any other known sick contacts or triggers. She denies h/o asthma or allergies. She is not UTD on COVID or flu vaccines for this season. She denies productive sputum or SOB. She denies ear  pain or SOB. She has taken tylenol and motrin with minimal symptom relief.     Cough  This is a new problem. The current episode started yesterday. The problem has been unchanged. The problem occurs every few minutes. The cough is Non-productive. Associated symptoms include myalgias, nasal congestion, postnasal drip, rhinorrhea and sweats. Pertinent negatives include no chest pain, chills, ear congestion, ear pain, fever, headaches, heartburn, hemoptysis, rash, sore throat, shortness of breath, weight loss or wheezing. The symptoms are aggravated by lying down. Treatments tried: Tylenol, ibuoprofen. The treatment provided mild relief. There is no history of asthma or environmental allergies.       Review of Systems   Review of Systems   Constitutional:  Positive for activity change and fatigue. Negative for appetite change, chills, fever and weight loss.   HENT:  Positive for congestion, postnasal drip and rhinorrhea. Negative for ear discharge, ear pain, sinus pressure, sinus pain, sneezing, sore throat and trouble swallowing.    Eyes:  Negative for visual disturbance.   Respiratory:  Positive for cough. Negative for hemoptysis, chest tightness, shortness of breath and wheezing.    Cardiovascular:  Negative for chest pain and palpitations.   Gastrointestinal:  Negative for abdominal pain, constipation, diarrhea, heartburn, nausea and vomiting.   Musculoskeletal:  Positive for myalgias. Negative for arthralgias, back pain and neck pain.   Skin:  Negative for color change, pallor and rash.   Allergic/Immunologic: Negative for environmental allergies and food allergies.   Neurological:  Negative for dizziness, light-headedness and headaches.         Current Medications       Current Outpatient Medications:     lisdexamfetamine (VYVANSE) 20 MG capsule, Take 1 capsule (20 mg total) by mouth every morning Max Daily Amount: 20 mg, Disp: 30 capsule, Rfl: 0    ondansetron (ZOFRAN) 4 mg tablet, Take 1 tablet (4 mg total) by  mouth every 8 (eight) hours as needed for nausea or vomiting (Patient not taking: Reported on 2024), Disp: 12 tablet, Rfl: 0    Current Allergies     Allergies as of 2025 - Reviewed 2025   Allergen Reaction Noted    Tetanus-diphtheria toxoids td Swelling 2018            The following portions of the patient's history were reviewed and updated as appropriate: allergies, current medications, past family history, past medical history, past social history, past surgical history and problem list.     Past Medical History:   Diagnosis Date    ADHD (attention deficit hyperactivity disorder)     Anemia     KVNG (generalized anxiety disorder)     History of transfusion 10/03/2008    Hemorrhage during child birth    Memory loss     Migraines     Miscarriage     , , ,        Past Surgical History:   Procedure Laterality Date     SECTION  12    DILATION AND CURETTAGE OF UTERUS      2005    KIDNEY SURGERY Right     Hydronephorsis while pregnant (pardon the spelling)    DE CYSTOURETHROSCOPY N/A 2024    Procedure: CYSTOSCOPY;  Surgeon: Mckenna Barcenas MD;  Location: [facility];  Service: Gynecology    DE LAPS W/VAG HYSTERECT 250 GM/&RMVL TUBE&/OVARIES Bilateral 2024    Procedure: HYSTERECTOMY LAPAROSCOPIC ASSISTED VAGINAL (LAVH) WITH BILATERAL SALPINGECTOMY, EXAM UNDER ANESTHESIA;  Surgeon: Mckenna Barcenas MD;  Location: [facility];  Service: Gynecology    DE SURGICAL ARTHROSCOPY SHOULDER W/LSS&RESCJ ADS Right 2024    Procedure: ARTHROSCOPY SHOULDER, Lysis of Adhesions, subacromial decompression, Manipulation Under Anesthesia;  Surgeon: Bret Azul MD;  Location: [facility];  Service: Orthopedics    TUBAL LIGATION Bilateral        Family History   Problem Relation Age of Onset    COPD Mother     Endometriosis Mother     Diabetes Mother     Learning disabilities Mother     Neurological problems Mother     Asthma Mother     Heart disease Mother      "Seizures Mother     No Known Problems Father     Learning disabilities Sister     Neurological problems Sister     No Known Problems Sister     No Known Problems Sister     No Known Problems Daughter          Medications have been verified.        Objective   /60   Pulse 98   Temp 98.1 °F (36.7 °C)   Resp 18   Ht 5' 2\" (1.575 m)   Wt 61.4 kg (135 lb 6.4 oz)   LMP 12/01/2024 (Approximate)   SpO2 98%   BMI 24.76 kg/m²        Physical Exam     Physical Exam  Vitals and nursing note reviewed.   Constitutional:       General: She is awake. She is not in acute distress.     Appearance: Normal appearance. She is well-developed and normal weight.   HENT:      Head: Normocephalic and atraumatic.      Right Ear: Hearing, tympanic membrane, ear canal and external ear normal.      Left Ear: Hearing, tympanic membrane, ear canal and external ear normal.      Nose: Congestion and rhinorrhea present. Rhinorrhea is clear.      Right Turbinates: Not enlarged, swollen or pale.      Left Turbinates: Not enlarged, swollen or pale.      Right Sinus: No maxillary sinus tenderness or frontal sinus tenderness.      Left Sinus: No maxillary sinus tenderness or frontal sinus tenderness.      Mouth/Throat:      Lips: Pink.      Mouth: Mucous membranes are moist.      Pharynx: Oropharynx is clear. Uvula midline. Postnasal drip present. No oropharyngeal exudate or posterior oropharyngeal erythema.      Tonsils: No tonsillar exudate or tonsillar abscesses. 2+ on the right. 2+ on the left.   Eyes:      Conjunctiva/sclera: Conjunctivae normal.   Cardiovascular:      Rate and Rhythm: Normal rate and regular rhythm.      Pulses: Normal pulses.      Heart sounds: Normal heart sounds.   Pulmonary:      Effort: Pulmonary effort is normal.      Breath sounds: Normal breath sounds.   Musculoskeletal:      Cervical back: Full passive range of motion without pain, normal range of motion and neck supple.   Lymphadenopathy:      Cervical: No " cervical adenopathy.   Skin:     General: Skin is warm and dry.   Neurological:      General: No focal deficit present.      Mental Status: She is alert and oriented to person, place, and time.   Psychiatric:         Mood and Affect: Mood normal.         Behavior: Behavior normal. Behavior is cooperative.         Thought Content: Thought content normal.         Judgment: Judgment normal.

## 2025-02-05 NOTE — PATIENT INSTRUCTIONS
Start tamiflu today - if negative for flu, stop taking medication. Symptoms of a viral infection may linger for up to 10 days. Your contagious period is the first 5-7 days of symptom onset. Continue over-the-counter products for symptoms: tylenol for fevers, ibuprofen for body aches, sudafed and flonase (fluticasone) with nasal saline for congestion, mucinex for cough, and airborne/emergen-c for vitamin supplementation. Follow-up with PCP in 3-5 days. Report to ER if symptoms worsen.

## 2025-02-05 NOTE — PROGRESS NOTES
[facility]        NAME: Angela Martin is a 39 y.o. female  : 1985    MRN: [id number]  DATE: 2025  TIME: 7:31 PM    Assessment and Plan   Upper respiratory tract infection, unspecified type [J06.9]  1. Upper respiratory tract infection, unspecified type        2. Acute cough  Covid19 and INFLUENZA A/B PCR        COVID/flu PCR sent to lab, will f/u if positive. Suspect viral illness given clinical presentation. Patient electing to start Tamiflu en niki of results. Advised to stop taking if negative for flu. VSS in clinic, appears in no acute distress. Educated on use of OTC products for additional relief of symptoms. Advised close follow-up with PCP or to report to the ER if symptoms worsen. Patient verbalizes understanding and agreeable to plan.      Patient Instructions      Start tamiflu today - if negative for flu, stop taking medication. Symptoms of a viral infection may linger for up to 10 days. Your contagious period is the first 5-7 days of symptom onset. Continue over-the-counter products for symptoms: tylenol for fevers, ibuprofen for body aches, sudafed and flonase (fluticasone) with nasal saline for congestion, mucinex for cough, and airborne/emergen-c for vitamin supplementation. Follow-up with PCP in 3-5 days. Report to ER if symptoms worsen.      Chief Complaint     Chief Complaint   Patient presents with    Cold Like Symptoms    Cough     Son diagnosed with Flu yesterday. She started with mild s/s last night - HA, body aches, dizziness, dry cough. Taking Advil - last dose at 2 pm.          History of Present Illness       39 year old female presents for evaluation of headache, body aches, cough, and congestion x 1 day. She reports being exposed to the flu over the weekend. She denies any other known sick contacts or triggers. She denies h/o asthma or allergies. She is not UTD on COVID or flu vaccines for this season. She denies productive sputum or SOB. She denies ear  pain or SOB. She has taken tylenol and motrin with minimal symptom relief.     Cough  This is a new problem. The current episode started yesterday. The problem has been unchanged. The problem occurs every few minutes. The cough is Non-productive. Associated symptoms include myalgias, nasal congestion, postnasal drip, rhinorrhea and sweats. Pertinent negatives include no chest pain, chills, ear congestion, ear pain, fever, headaches, heartburn, hemoptysis, rash, sore throat, shortness of breath, weight loss or wheezing. The symptoms are aggravated by lying down. Treatments tried: Tylenol, ibuoprofen. The treatment provided mild relief. There is no history of asthma or environmental allergies.       Review of Systems   Review of Systems   Constitutional:  Positive for activity change and fatigue. Negative for appetite change, chills, fever and weight loss.   HENT:  Positive for congestion, postnasal drip and rhinorrhea. Negative for ear discharge, ear pain, sinus pressure, sinus pain, sneezing, sore throat and trouble swallowing.    Eyes:  Negative for visual disturbance.   Respiratory:  Positive for cough. Negative for hemoptysis, chest tightness, shortness of breath and wheezing.    Cardiovascular:  Negative for chest pain and palpitations.   Gastrointestinal:  Negative for abdominal pain, constipation, diarrhea, heartburn, nausea and vomiting.   Musculoskeletal:  Positive for myalgias. Negative for arthralgias, back pain and neck pain.   Skin:  Negative for color change, pallor and rash.   Allergic/Immunologic: Negative for environmental allergies and food allergies.   Neurological:  Negative for dizziness, light-headedness and headaches.         Current Medications       Current Outpatient Medications:     lisdexamfetamine (VYVANSE) 20 MG capsule, Take 1 capsule (20 mg total) by mouth every morning Max Daily Amount: 20 mg, Disp: 30 capsule, Rfl: 0    ondansetron (ZOFRAN) 4 mg tablet, Take 1 tablet (4 mg total) by  mouth every 8 (eight) hours as needed for nausea or vomiting (Patient not taking: Reported on 2024), Disp: 12 tablet, Rfl: 0    Current Allergies     Allergies as of 2025 - Reviewed 2025   Allergen Reaction Noted    Tetanus-diphtheria toxoids td Swelling 2018            The following portions of the patient's history were reviewed and updated as appropriate: allergies, current medications, past family history, past medical history, past social history, past surgical history and problem list.     Past Medical History:   Diagnosis Date    ADHD (attention deficit hyperactivity disorder)     Anemia     KVNG (generalized anxiety disorder)     History of transfusion 10/03/2008    Hemorrhage during child birth    Memory loss     Migraines     Miscarriage     , , ,        Past Surgical History:   Procedure Laterality Date     SECTION  12    DILATION AND CURETTAGE OF UTERUS      2005    KIDNEY SURGERY Right     Hydronephorsis while pregnant (pardon the spelling)    FL CYSTOURETHROSCOPY N/A 2024    Procedure: CYSTOSCOPY;  Surgeon: Mckenna Barcenas MD;  Location: [facility];  Service: Gynecology    FL LAPS W/VAG HYSTERECT 250 GM/&RMVL TUBE&/OVARIES Bilateral 2024    Procedure: HYSTERECTOMY LAPAROSCOPIC ASSISTED VAGINAL (LAVH) WITH BILATERAL SALPINGECTOMY, EXAM UNDER ANESTHESIA;  Surgeon: Mckenna Barcenas MD;  Location: [facility];  Service: Gynecology    FL SURGICAL ARTHROSCOPY SHOULDER W/LSS&RESCJ ADS Right 2024    Procedure: ARTHROSCOPY SHOULDER, Lysis of Adhesions, subacromial decompression, Manipulation Under Anesthesia;  Surgeon: Bret Azul MD;  Location: [facility];  Service: Orthopedics    TUBAL LIGATION Bilateral        Family History   Problem Relation Age of Onset    COPD Mother     Endometriosis Mother     Diabetes Mother     Learning disabilities Mother     Neurological problems Mother     Asthma Mother     Heart disease Mother      "Seizures Mother     No Known Problems Father     Learning disabilities Sister     Neurological problems Sister     No Known Problems Sister     No Known Problems Sister     No Known Problems Daughter          Medications have been verified.        Objective   /60   Pulse 98   Temp 98.1 °F (36.7 °C)   Resp 18   Ht 5' 2\" (1.575 m)   Wt 61.4 kg (135 lb 6.4 oz)   LMP 12/01/2024 (Approximate)   SpO2 98%   BMI 24.76 kg/m²        Physical Exam     Physical Exam  Vitals and nursing note reviewed.   Constitutional:       General: She is awake. She is not in acute distress.     Appearance: Normal appearance. She is well-developed and normal weight.   HENT:      Head: Normocephalic and atraumatic.      Right Ear: Hearing, tympanic membrane, ear canal and external ear normal.      Left Ear: Hearing, tympanic membrane, ear canal and external ear normal.      Nose: Congestion and rhinorrhea present. Rhinorrhea is clear.      Right Turbinates: Not enlarged, swollen or pale.      Left Turbinates: Not enlarged, swollen or pale.      Right Sinus: No maxillary sinus tenderness or frontal sinus tenderness.      Left Sinus: No maxillary sinus tenderness or frontal sinus tenderness.      Mouth/Throat:      Lips: Pink.      Mouth: Mucous membranes are moist.      Pharynx: Oropharynx is clear. Uvula midline. Postnasal drip present. No oropharyngeal exudate or posterior oropharyngeal erythema.      Tonsils: No tonsillar exudate or tonsillar abscesses. 2+ on the right. 2+ on the left.   Eyes:      Conjunctiva/sclera: Conjunctivae normal.   Cardiovascular:      Rate and Rhythm: Normal rate and regular rhythm.      Pulses: Normal pulses.      Heart sounds: Normal heart sounds.   Pulmonary:      Effort: Pulmonary effort is normal.      Breath sounds: Normal breath sounds.   Musculoskeletal:      Cervical back: Full passive range of motion without pain, normal range of motion and neck supple.   Lymphadenopathy:      Cervical: No " cervical adenopathy.   Skin:     General: Skin is warm and dry.   Neurological:      General: No focal deficit present.      Mental Status: She is alert and oriented to person, place, and time.   Psychiatric:         Mood and Affect: Mood normal.         Behavior: Behavior normal. Behavior is cooperative.         Thought Content: Thought content normal.         Judgment: Judgment normal.

## 2025-02-07 ENCOUNTER — ANESTHESIA EVENT (OUTPATIENT)
Dept: PERIOP | Facility: HOSPITAL | Age: 40
End: 2025-02-07
Payer: COMMERCIAL

## 2025-02-18 DIAGNOSIS — F90.9 ADHD: ICD-10-CM

## 2025-02-18 PROBLEM — N39.3 SUI (STRESS URINARY INCONTINENCE, FEMALE): Status: ACTIVE | Noted: 2025-02-18

## 2025-02-18 NOTE — DISCHARGE INSTR - AVS FIRST PAGE
Post-Urogynecologic Surgery Discharge Instructions:  1. Nothing in the vagina for six weeks  2. You may take stairs one at a time, touching each step with both feet for the first few days, then as tolerated.  3. Call the office for fever greater than 100.4'F, heavy vaginal bleeding, or increasing pain.  4. Activity as tolerated.  5. Please take the following for postoperative bowel regimen: colace 100 mg twice daily, miralax 17g daily, milk of magnesia as needed    Post Operative Pain Management:  If you have cramping or mild pain you may take 600 mg Ibuprofen every 6 hours to relieve.     If you continue to have residual mild pain not entirely relieved by Ibuprofen then you may take 650 mg of tylenol every 6 hours.       If you have any questions regarding your prescriptions please call your doctor.    Urethral Sling Procedure   WHAT YOU NEED TO KNOW:   A bladder sling procedure is surgery to treat urinary incontinence in women. The sling acts as a hammock to keep your urethra in place and hold it closed when your bladder is full. You may have vaginal bleeding or discharge for up to a week after your surgery. Use sanitary pads. Do not use tampons. You may have some pelvic discomfort or trouble urinating.   DISCHARGE INSTRUCTIONS:   Call 911 for any of the following:   You have sudden trouble breathing.    Seek care immediately if:   Your bleeding gets worse.    You have yellow or foul smelling discharge from your vagina.    You cannot urinate, or you are urinating less than what is normal for you.    You feel confused.  Contact your healthcare provider if:   You have a fever.    You do not feel like you are able to empty your bladder completely when you urinate.    You feel the need to urinate very suddenly.    You have burning or stinging when you urinate.    You have blood in your urine.    Your skin is itchy, swollen, or you have a rash.    You have questions or concerns about your condition or care.  Medicines:    Prescription pain medicine  may be given. Ask your how to take this medicine safely.    Take your medicine as directed.  Contact your healthcare provider if you think your medicine is not helping or if you have side effects. Tell him or her if you are allergic to any medicine. Keep a list of the medicines, vitamins, and herbs you take. Include the amounts, and when and why you take them. Bring the list or the pill bottles to follow-up visits. Carry your medicine list with you in case of an emergency.  Self-catheterization:  You may need to put a catheter into your bladder after you urinate to empty any remaining urine. A catheter is a small rubber tube used to drain urine. Healthcare providers will teach you how to put the catheter in safely. This may be needed until you are completely emptying your bladder.  Mccord catheter:  You may have a Mccord catheter for a short period of time. The Mccord is a tube put into your bladder to drain urine into a bag. Keep the bag below your waist. This will prevent urine from flowing back into your bladder and causing an infection or other problems. Also, keep the tube free of kinks so the urine will drain properly. Do not pull on the catheter. This can cause pain and bleeding, and may cause the catheter to come out.   Activity:  Do not lift heavy objects for 6 weeks after your procedure. Do not have intercourse for 4 to 6 weeks. Do not use a tampon for 4 weeks. Ask your healthcare provider when you can return to work or your usual activities.  Do pelvic muscle exercises:  These are also called Kegel exercises. These exercises help strengthen your pelvic muscles and help prevent urine leakage. Tighten the muscles of your pelvis and hold them tight for 5 seconds, then relax for 5 seconds. Gradually work up to tightening them for 10 seconds and relaxing for 10 seconds. Do this 3 times each day.  Keep a record:  Keep a record of when you urinate and if you leak any urine. Write down  what you were doing when you leaked urine, such as coughing or sneezing. Bring the log to your follow-up visits.  Prevent constipation:  Drink liquids as directed. You may need to drink more water than usual to soften your bowel movements. Eat a variety of healthy foods, especially fruit and foods high in fiber. You may need to use an over-the-counter bowel movement softener.  Follow up with your healthcare provider as directed:  You may need a test to check how much urine remains in your bladder after you urinate. This will help show how the sling is working. Write down your questions so you remember to ask them during your visits.  © 2017 [employer] Information is for End User's use only and may not be sold, redistributed or otherwise used for commercial purposes. All illustrations and images included in CareNotes® are the copyrighted property of [employer] or [employer].  The above information is an  only. It is not intended as medical advice for individual conditions or treatments. Talk to your doctor, nurse or pharmacist before following any medical regimen to see if it is safe and effective for you.

## 2025-02-19 RX ORDER — LISDEXAMFETAMINE DIMESYLATE 20 MG/1
20 CAPSULE ORAL EVERY MORNING
Qty: 30 CAPSULE | Refills: 0 | Status: SHIPPED | OUTPATIENT
Start: 2025-02-19

## 2025-02-19 NOTE — TELEPHONE ENCOUNTER
Medication: Vyvanse    Dose/Frequency: 20 mg    Quantity: 30    Ph[facility]: [facility]    Office:   [] PCP/Provider -   [x] Speciality/Provider -     Does the patient have enough for 3 days?   [] Yes   [] No - Send as HP to POD

## 2025-02-19 NOTE — ANESTHESIA PREPROCEDURE EVALUATION
Procedure:  INSERTION PV SLING (FEMALE)  ( ALTIS)   & EUA (Vagina )  CYSTO (Bladder)    Relevant Problems   CARDIO   (+) Intractable migraine without aura and without status migrainosus      HEMATOLOGY   (+) Alpha thalassemia trait      MUSCULOSKELETAL   (+) Adhesive capsulitis of right shoulder      NEURO/PSYCH   (+) Intractable migraine without aura and without status migrainosus        Physical Exam    Airway    Mallampati score: II  TM Distance: >3 FB  Neck ROM: full     Dental   No notable dental hx     Cardiovascular  Rhythm: regular, Rate: normal    Pulmonary   Breath sounds clear to auscultation    Other Findings  post-pubertal.      Anesthesia Plan  ASA Score- 2     Anesthesia Type- IV sedation with anesthesia with ASA Monitors.         Additional Monitors:     Airway Plan:            Plan Factors-Exercise tolerance (METS): >4 METS.    Chart reviewed. EKG reviewed.  Existing labs reviewed. Patient summary reviewed.    Patient is not a current smoker.              Induction- intravenous.    Postoperative Plan- Plan for postoperative opioid use. Planned trial extubation        Informed Consent- Anesthetic plan and risks discussed with patient.  I personally reviewed this patient with the CRNA. Discussed and agreed on the Anesthesia Plan with the CRNA..

## 2025-02-19 NOTE — PRE-PROCEDURE INSTRUCTIONS
Pre-Surgery Instructions:   Medication Instructions    lisdexamfetamine (VYVANSE) 20 MG capsule Hold day of surgery.   Medication instructions for day of surgery reviewed. Please take all instructed medications with only a sip of water.       You will receive a call one business day prior to surgery with an arrival time and hospital directions. If your surgery is scheduled on a Monday, the hospital will be calling you on the Friday prior to your surgery. If you have not heard from anyone by 8pm, please call the hospital supervisor through the hospital  at [phone]. ([place] [phone] or [place] [phone]).    Do not eat or drink anything after midnight the night before your surgery, including candy, mints, lifesavers, or chewing gum. Do not drink alcohol 24hrs before your surgery. Try not to smoke at least 24hrs before your surgery.       Follow the pre surgery showering instructions as listed in the “My Surgical Experience Booklet” or otherwise provided by your surgeon's office. Do not use a blade to shave the surgical area 1 week before surgery. It is okay to use a clean electric clippers up to 24 hours before surgery. Do not apply any lotions, creams, including makeup, cologne, deodorant, or perfumes after showering on the day of your surgery. Do not use dry shampoo, hair spray, hair gel, or any type of hair products.     No contact lenses, eye make-up, or artificial eyelashes. Remove nail polish, including gel polish, and any artificial, gel, or acrylic nails if possible. Remove all jewelry including rings and body piercing jewelry.     Wear causal clothing that is easy to take on and off. Consider your type of surgery.    Keep any valuables, jewelry, piercings at home. Please bring any specially ordered equipment (sling, braces) if indicated.    Arrange for a responsible person to drive you to and from the hospital on the day of your surgery. Please confirm the visitor policy for the day of  your procedure when you receive your phone call with an arrival time.     Call the surgeon's office with any new illnesses, exposures, or additional questions prior to surgery.    Please reference your “My Surgical Experience Booklet” for additional information to prepare for your upcoming surgery.

## 2025-02-20 ENCOUNTER — TELEPHONE (OUTPATIENT)
Dept: OBGYN CLINIC | Facility: CLINIC | Age: 40
End: 2025-02-20

## 2025-02-20 ENCOUNTER — ANESTHESIA (OUTPATIENT)
Dept: PERIOP | Facility: HOSPITAL | Age: 40
End: 2025-02-20
Payer: COMMERCIAL

## 2025-02-20 ENCOUNTER — HOSPITAL ENCOUNTER (OUTPATIENT)
Facility: HOSPITAL | Age: 40
Setting detail: OUTPATIENT SURGERY
Discharge: HOME | End: 2025-02-20
Attending: STUDENT IN AN ORGANIZED HEALTH CARE EDUCATION/TRAINING PROGRAM | Admitting: STUDENT IN AN ORGANIZED HEALTH CARE EDUCATION/TRAINING PROGRAM
Payer: COMMERCIAL

## 2025-02-20 VITALS
DIASTOLIC BLOOD PRESSURE: 55 MMHG | SYSTOLIC BLOOD PRESSURE: 95 MMHG | TEMPERATURE: 97.4 F | OXYGEN SATURATION: 96 % | RESPIRATION RATE: 16 BRPM | BODY MASS INDEX: 24.48 KG/M2 | WEIGHT: 133.82 LBS | HEART RATE: 63 BPM

## 2025-02-20 DIAGNOSIS — M99.05 SOMATIC DYSFUNCTION OF PELVIC REGION: ICD-10-CM

## 2025-02-20 DIAGNOSIS — M79.18 MYOFASCIAL PAIN DYSFUNCTION SYNDROME: ICD-10-CM

## 2025-02-20 DIAGNOSIS — N39.3 FEMALE STRESS INCONTINENCE: ICD-10-CM

## 2025-02-20 PROCEDURE — C1771 REP DEV, URINARY, W/SLING: HCPCS | Performed by: STUDENT IN AN ORGANIZED HEALTH CARE EDUCATION/TRAINING PROGRAM

## 2025-02-20 DEVICE — SINGLE INCISION SLING SYSTEM
Type: IMPLANTABLE DEVICE | Site: BLADDER | Status: FUNCTIONAL
Brand: ALTIS

## 2025-02-20 RX ORDER — ACETAMINOPHEN 325 MG/1
975 TABLET ORAL ONCE
Status: COMPLETED | OUTPATIENT
Start: 2025-02-20 | End: 2025-02-20

## 2025-02-20 RX ORDER — PROPOFOL 10 MG/ML
INJECTION, EMULSION INTRAVENOUS AS NEEDED
Status: DISCONTINUED | OUTPATIENT
Start: 2025-02-20 | End: 2025-02-20

## 2025-02-20 RX ORDER — FENTANYL CITRATE/PF 50 MCG/ML
25 SYRINGE (ML) INJECTION
Status: DISCONTINUED | OUTPATIENT
Start: 2025-02-20 | End: 2025-02-20 | Stop reason: HOSPADM

## 2025-02-20 RX ORDER — MIDAZOLAM HYDROCHLORIDE 2 MG/2ML
INJECTION, SOLUTION INTRAMUSCULAR; INTRAVENOUS AS NEEDED
Status: DISCONTINUED | OUTPATIENT
Start: 2025-02-20 | End: 2025-02-20

## 2025-02-20 RX ORDER — SODIUM CHLORIDE, SODIUM LACTATE, POTASSIUM CHLORIDE, CALCIUM CHLORIDE 600; 310; 30; 20 MG/100ML; MG/100ML; MG/100ML; MG/100ML
125 INJECTION, SOLUTION INTRAVENOUS CONTINUOUS
Status: DISCONTINUED | OUTPATIENT
Start: 2025-02-20 | End: 2025-02-20 | Stop reason: HOSPADM

## 2025-02-20 RX ORDER — SODIUM CHLORIDE 9 MG/ML
INJECTION INTRAVENOUS AS NEEDED
Status: DISCONTINUED | OUTPATIENT
Start: 2025-02-20 | End: 2025-02-20 | Stop reason: HOSPADM

## 2025-02-20 RX ORDER — ACETAMINOPHEN 325 MG/1
975 TABLET ORAL EVERY 6 HOURS PRN
Status: DISCONTINUED | OUTPATIENT
Start: 2025-02-20 | End: 2025-02-20 | Stop reason: HOSPADM

## 2025-02-20 RX ORDER — LIDOCAINE HYDROCHLORIDE 20 MG/ML
INJECTION, SOLUTION EPIDURAL; INFILTRATION; INTRACAUDAL; PERINEURAL AS NEEDED
Status: DISCONTINUED | OUTPATIENT
Start: 2025-02-20 | End: 2025-02-20

## 2025-02-20 RX ORDER — BUPIVACAINE HYDROCHLORIDE 2.5 MG/ML
INJECTION, SOLUTION EPIDURAL; INFILTRATION; INTRACAUDAL AS NEEDED
Status: DISCONTINUED | OUTPATIENT
Start: 2025-02-20 | End: 2025-02-20 | Stop reason: HOSPADM

## 2025-02-20 RX ORDER — ONDANSETRON 2 MG/ML
4 INJECTION INTRAMUSCULAR; INTRAVENOUS EVERY 6 HOURS PRN
Status: DISCONTINUED | OUTPATIENT
Start: 2025-02-20 | End: 2025-02-20 | Stop reason: HOSPADM

## 2025-02-20 RX ORDER — SODIUM CHLORIDE, SODIUM LACTATE, POTASSIUM CHLORIDE, CALCIUM CHLORIDE 600; 310; 30; 20 MG/100ML; MG/100ML; MG/100ML; MG/100ML
INJECTION, SOLUTION INTRAVENOUS CONTINUOUS PRN
Status: DISCONTINUED | OUTPATIENT
Start: 2025-02-20 | End: 2025-02-20

## 2025-02-20 RX ORDER — KETOROLAC TROMETHAMINE 30 MG/ML
INJECTION, SOLUTION INTRAMUSCULAR; INTRAVENOUS AS NEEDED
Status: DISCONTINUED | OUTPATIENT
Start: 2025-02-20 | End: 2025-02-20

## 2025-02-20 RX ORDER — MAGNESIUM HYDROXIDE 1200 MG/15ML
LIQUID ORAL AS NEEDED
Status: DISCONTINUED | OUTPATIENT
Start: 2025-02-20 | End: 2025-02-20 | Stop reason: HOSPADM

## 2025-02-20 RX ORDER — PHENYLEPHRINE HCL IN 0.9% NACL 1 MG/10 ML
SYRINGE (ML) INTRAVENOUS AS NEEDED
Status: DISCONTINUED | OUTPATIENT
Start: 2025-02-20 | End: 2025-02-20

## 2025-02-20 RX ORDER — PROPOFOL 10 MG/ML
INJECTION, EMULSION INTRAVENOUS CONTINUOUS PRN
Status: DISCONTINUED | OUTPATIENT
Start: 2025-02-20 | End: 2025-02-20

## 2025-02-20 RX ORDER — LIDOCAINE HYDROCHLORIDE AND EPINEPHRINE 10; 10 MG/ML; UG/ML
INJECTION, SOLUTION INFILTRATION; PERINEURAL AS NEEDED
Status: DISCONTINUED | OUTPATIENT
Start: 2025-02-20 | End: 2025-02-20 | Stop reason: HOSPADM

## 2025-02-20 RX ORDER — ONDANSETRON 2 MG/ML
4 INJECTION INTRAMUSCULAR; INTRAVENOUS ONCE AS NEEDED
Status: DISCONTINUED | OUTPATIENT
Start: 2025-02-20 | End: 2025-02-20 | Stop reason: HOSPADM

## 2025-02-20 RX ORDER — CEFAZOLIN SODIUM 2 G/50ML
2000 SOLUTION INTRAVENOUS ONCE
Status: COMPLETED | OUTPATIENT
Start: 2025-02-20 | End: 2025-02-20

## 2025-02-20 RX ORDER — ONDANSETRON 2 MG/ML
INJECTION INTRAMUSCULAR; INTRAVENOUS AS NEEDED
Status: DISCONTINUED | OUTPATIENT
Start: 2025-02-20 | End: 2025-02-20

## 2025-02-20 RX ORDER — MEPERIDINE HYDROCHLORIDE 25 MG/ML
12.5 INJECTION INTRAMUSCULAR; INTRAVENOUS; SUBCUTANEOUS
Status: DISCONTINUED | OUTPATIENT
Start: 2025-02-20 | End: 2025-02-20 | Stop reason: HOSPADM

## 2025-02-20 RX ORDER — IBUPROFEN 600 MG/1
600 TABLET, FILM COATED ORAL EVERY 6 HOURS PRN
Status: DISCONTINUED | OUTPATIENT
Start: 2025-02-20 | End: 2025-02-20 | Stop reason: HOSPADM

## 2025-02-20 RX ORDER — PHENAZOPYRIDINE HYDROCHLORIDE 100 MG/1
100 TABLET, FILM COATED ORAL ONCE
Status: COMPLETED | OUTPATIENT
Start: 2025-02-20 | End: 2025-02-20

## 2025-02-20 RX ORDER — FENTANYL CITRATE 50 UG/ML
INJECTION, SOLUTION INTRAMUSCULAR; INTRAVENOUS AS NEEDED
Status: DISCONTINUED | OUTPATIENT
Start: 2025-02-20 | End: 2025-02-20

## 2025-02-20 RX ORDER — HYDROMORPHONE HCL/PF 1 MG/ML
0.5 SYRINGE (ML) INJECTION
Status: DISCONTINUED | OUTPATIENT
Start: 2025-02-20 | End: 2025-02-20 | Stop reason: HOSPADM

## 2025-02-20 RX ADMIN — PHENAZOPYRIDINE 100 MG: 100 TABLET ORAL at 06:08

## 2025-02-20 RX ADMIN — ACETAMINOPHEN 975 MG: 325 TABLET, FILM COATED ORAL at 06:07

## 2025-02-20 RX ADMIN — MIDAZOLAM 2 MG: 1 INJECTION INTRAMUSCULAR; INTRAVENOUS at 07:31

## 2025-02-20 RX ADMIN — DEXMEDETOMIDINE HYDROCHLORIDE 4 MCG: 100 INJECTION, SOLUTION INTRAVENOUS at 07:41

## 2025-02-20 RX ADMIN — SODIUM CHLORIDE, SODIUM LACTATE, POTASSIUM CHLORIDE, AND CALCIUM CHLORIDE: .6; .31; .03; .02 INJECTION, SOLUTION INTRAVENOUS at 08:18

## 2025-02-20 RX ADMIN — KETOROLAC TROMETHAMINE 30 MG: 30 INJECTION, SOLUTION INTRAMUSCULAR; INTRAVENOUS at 08:19

## 2025-02-20 RX ADMIN — Medication 100 MCG: at 08:03

## 2025-02-20 RX ADMIN — FENTANYL CITRATE 25 MCG: 50 INJECTION INTRAMUSCULAR; INTRAVENOUS at 08:17

## 2025-02-20 RX ADMIN — PROPOFOL 140 MCG/KG/MIN: 10 INJECTION, EMULSION INTRAVENOUS at 07:37

## 2025-02-20 RX ADMIN — DEXMEDETOMIDINE HYDROCHLORIDE 4 MCG: 100 INJECTION, SOLUTION INTRAVENOUS at 07:36

## 2025-02-20 RX ADMIN — SODIUM CHLORIDE, SODIUM LACTATE, POTASSIUM CHLORIDE, AND CALCIUM CHLORIDE 125 ML/HR: .6; .31; .03; .02 INJECTION, SOLUTION INTRAVENOUS at 06:25

## 2025-02-20 RX ADMIN — FENTANYL CITRATE 25 MCG: 50 INJECTION INTRAMUSCULAR; INTRAVENOUS at 08:12

## 2025-02-20 RX ADMIN — CEFAZOLIN SODIUM 2000 MG: 2 SOLUTION INTRAVENOUS at 07:36

## 2025-02-20 RX ADMIN — PROPOFOL 50 MG: 10 INJECTION, EMULSION INTRAVENOUS at 08:08

## 2025-02-20 RX ADMIN — FENTANYL CITRATE 25 MCG: 50 INJECTION INTRAMUSCULAR; INTRAVENOUS at 07:50

## 2025-02-20 RX ADMIN — FENTANYL CITRATE 25 MCG: 50 INJECTION INTRAMUSCULAR; INTRAVENOUS at 07:51

## 2025-02-20 RX ADMIN — LIDOCAINE HYDROCHLORIDE 40 MG: 20 INJECTION, SOLUTION EPIDURAL; INFILTRATION; INTRACAUDAL at 07:36

## 2025-02-20 RX ADMIN — DEXMEDETOMIDINE HYDROCHLORIDE 4 MCG: 100 INJECTION, SOLUTION INTRAVENOUS at 07:44

## 2025-02-20 RX ADMIN — SODIUM CHLORIDE, SODIUM LACTATE, POTASSIUM CHLORIDE, AND CALCIUM CHLORIDE: .6; .31; .03; .02 INJECTION, SOLUTION INTRAVENOUS at 07:31

## 2025-02-20 RX ADMIN — ONDANSETRON 4 MG: 2 INJECTION INTRAMUSCULAR; INTRAVENOUS at 07:31

## 2025-02-20 RX ADMIN — PROPOFOL 50 MG: 10 INJECTION, EMULSION INTRAVENOUS at 07:36

## 2025-02-20 NOTE — TELEPHONE ENCOUNTER
----- Message from Alicia BARON sent at 2/20/2025 10:31 AM EST -----  LVM to call office for post op  ----- Message -----  From: Justen Norman MD  Sent: 2/20/2025  10:06 AM EST  To: Rabia Ryan MD; Brittany Juan MA; #    Hi Team, patient had surgery today can we make sure her post-op appointment is scheduled. Thanks!

## 2025-02-20 NOTE — ANESTHESIA POSTPROCEDURE EVALUATION
Post-Op Assessment Note            No anethesia notable event occurred.            Last Filed PACU Vitals:  Vitals Value Taken Time   Temp 97.5 °F (36.4 °C) 02/20/25 0905   Pulse 54 02/20/25 0911   BP 91/55 02/20/25 0905   Resp 16 02/20/25 0905   SpO2 100 % 02/20/25 0911   Vitals shown include unfiled device data.    Modified Randal:     Vitals Value Taken Time   Activity 2 02/20/25 0905   Respiration 2 02/20/25 0905   Circulation 2 02/20/25 0905   Consciousness 2 02/20/25 0905   Oxygen Saturation 2 02/20/25 0905     Modified Randal Score: 10

## 2025-02-20 NOTE — OP NOTE
OPERATIVE REPORT  PATIENT NAME: Angela Martin    :  1985  MRN: [id number]  Pt Location: [facility] ROOM 04    SURGERY DATE: 2025    Surgeons and Role:     * Justen Norman MD - Primary     * Rabia Ryan MD - Assisting    Preop Diagnosis:  Myofascial pain dysfunction syndrome [M79.18]  Somatic dysfunction of pelvic region [M99.05]  Female stress incontinence [N39.3]    Post-Op Diagnosis Codes:     * Myofascial pain dysfunction syndrome [M79.18]     * Somatic dysfunction of pelvic region [M99.05]     * Female stress incontinence [N39.3]    Procedure(s):  INSERTION PV SLING (FEMALE)  ( ALTIS)   & EUA  CYSTO    Specimen(s):  * No specimens in log *    Estimated Blood Loss:   Minimal    Drains:  Urethral Catheter Non-latex 18 Fr. (Active)   Number of days: 0       Anesthesia Type:   IV Sedation with Anesthesia    Operative Indications:  Myofascial pain dysfunction syndrome [M79.18]  Somatic dysfunction of pelvic region [M99.05]  Female stress incontinence [N39.3]    Operative Findings:  Normal vaginal mucosa  Grade 1-2 cystocele   Hypertonic levator ani muscles  Cystoscopy with normal bladder wall mucosa without sutures or defects in bladder lumen. Efflux visualized from bilateral ureteral orifices.     Complications:   None    Procedure and Technique:  The patient was properly identified in the holding area by the operating room staff and attending physician. She was taken to the operating room where general anesthesia was instituted without complication. She was placed in the dorsal lithotomy position with her legs in Fred stirrups with care taken to avoid excessive flexion or extension of her lower extremities. Time-out was taken. The patient was again properly identified by the operating room staff and operating physician. At this time, the patient was prepped and draped in normal sterile fashion. We inserted the Mccord catheter under sterile conditions for intermittent bladder drainage.     We turned  our attention for performance of the single incision sling. At this time, the mid urethral zone was identified in reference to the Mccord catheter and urethral meatus and local anesthetic solution was injected into the anterior vaginal wall at the mid urethral level for hydrodissection and vasoconstriction. Next, 10 mL was injected at the midline. An additional 7 mL were injected to the left and right of midline directing the infiltration laterally towards the cephalad aspect of the inferior pubic ramus bilaterally. Care was taken to ensure that the sulcus was flattened and free of infiltration to minimize chance for buttonholing or tapping too close to the anterolateral sulcus. After completion of hydrodissection, 2 Allis clamps were used to grasp the anterior vaginal wall for traction. A 1.5 cm incision was made to the midline. Two Allis clamps were then placed on each cut edge of the incision for stabilization. Tenotomy scissors were used to create a small vaginal tunnel with sharp and blunt dissection above the anterior vaginal wall directed laterally towards the cephalad aspect of the inferior pubic ramus bilaterally. Dissection was carried out to the edge of the bone itself but no dissection into the obturator internus muscle. Once the dissection was complete, the sling was placed. The Altis system was used. An index finger was placed in the vagina for guidance. The Altis trocar was placed into the pre-dissected tract. The handle was held in horizontal slight upward canting to avoid buttonholing of the sulcus. Cephalad drift was used to allow passage around the inferior pubic ramus. A thumb was placed on the heel of the introducer to allow a push/pivot maneuver to place a fixed anchor into the obturator internus muscle membrane complex on the patient's left side. Proper handle deviation confirmed proper anchor placement, as well as a gentle tugging on the sling. Once the introducer was removed, it also  confirmed proper anchor placement. The exact same sequence of steps was repeated on the patient's right side with adjustable anchor. Once it was complete, the introducer was removed and gentle tugging again confirmed proper anchor placement. The Mccord catheter was then used to drain the bladder and then it was removed and a diagnostic cystoscopy was performed by instilling 300 mL of fluid into the bladder. Both ureters were effluxing normally and there were no lacerations in the lower urinary tract. We used Crede maneuver to elicit loss of fluid from the urethral meatus and there was loss of fluid noted. The tensioning suture was used to adjust the tape until there was minimal to no leakage with Crede. After appropriate tensioning, the tensioning suture was cut and the vaginal incision was closed with 2-0 Vicryl suture in a running locked stitch. At this time, the Mccord catheter replaced and the bladder was drained. Mccord was kept in place.    We then turned our attention to performing the pelvic floor botox injections. The 200 units of botox were reconstituted in 10mL of 0.25% Bupivicane and 10mL of injectable saline. We used a pudendal nerve block kit and we palpated tension over the left obturator and the entire band of her levator muscles. 30 units of botox (3 mL) was injected to each obturator muscle complex and the remaining 140 units (14 mL) was injected sequentially across the hypertonic levator muscle band from 2 to 5 and 7 to 11 oclock positions in the vagina with each puncture delivering 1-2 mL of botox.  We completed the procedure. There were no complications. Injection sites were hemostatic.     The sponge, needle and instrument count were correct x2. The patient tolerated the procedure well and went to the recovery room in stable condition and she is stable at the moment of this dictation.    I was present for the entire procedure.    Patient Disposition:  PACU         SIGNATURE: Justen Norman  MD  DATE: February 20, 2025  TIME: 8:18 AM

## 2025-02-20 NOTE — INTERVAL H&P NOTE
H&P reviewed. After examining the patient I find no changes in the patients condition since the H&P had been written.    Vitals:    02/20/25 0637   BP: 96/55   Pulse: 65   Resp:    Temp:    SpO2:

## 2025-02-20 NOTE — ANESTHESIA POSTPROCEDURE EVALUATION
Post-Op Assessment Note    CV Status:  Stable  Pain Score: 0    Pain management: adequate    Multimodal analgesia used between 6 hours prior to anesthesia start to PACU discharge    Mental Status:  Awake   Hydration Status:  Stable   PONV Controlled:  None   Airway Patency:  Patent     Post Op Vitals Reviewed: Yes    No anethesia notable event occurred.    Staff: ASHLEY       BP 96/55 Comment: pt states her normal is 90/60  Pulse 65   Temp 97.9 °F (36.6 °C) (Temporal)   Resp 16   Wt 60.7 kg (133 lb 13.1 oz)   LMP 12/01/2024 (Approximate)   SpO2 97%   BMI 24.48 kg/m²      Last Filed PACU Vitals:  Vitals Value Taken Time   Temp     Pulse     BP     Resp     SpO2

## 2025-02-21 ENCOUNTER — TELEPHONE (OUTPATIENT)
Dept: OBGYN CLINIC | Facility: CLINIC | Age: 40
End: 2025-02-21

## 2025-02-21 DIAGNOSIS — G89.18 ACUTE POSTOPERATIVE PAIN OF GROIN, UNSPECIFIED LATERALITY: ICD-10-CM

## 2025-02-21 DIAGNOSIS — M79.18 MYOFASCIAL PAIN DYSFUNCTION SYNDROME: Primary | ICD-10-CM

## 2025-02-21 DIAGNOSIS — R10.30 ACUTE POSTOPERATIVE PAIN OF GROIN, UNSPECIFIED LATERALITY: ICD-10-CM

## 2025-02-21 RX ORDER — CYCLOBENZAPRINE HCL 10 MG
10 TABLET ORAL 3 TIMES DAILY PRN
Qty: 10 TABLET | Refills: 0 | Status: SHIPPED | OUTPATIENT
Start: 2025-02-21

## 2025-02-21 NOTE — TELEPHONE ENCOUNTER
Patient called because she would like to speak to providers reg. Pain after her surgery. She states she is not having any pain on incision but has been experiencing Sciatica pain. She states she has asked around and other people have told her this is not normal and she should contact surgeon. Please advise.

## 2025-02-24 ENCOUNTER — EVALUATION (OUTPATIENT)
Dept: PHYSICAL THERAPY | Facility: CLINIC | Age: 40
End: 2025-02-24
Payer: COMMERCIAL

## 2025-02-24 DIAGNOSIS — M75.01 ADHESIVE CAPSULITIS OF RIGHT SHOULDER: Primary | ICD-10-CM

## 2025-02-24 PROCEDURE — 97164 PT RE-EVAL EST PLAN CARE: CPT | Performed by: PHYSICAL THERAPIST

## 2025-02-24 PROCEDURE — 97140 MANUAL THERAPY 1/> REGIONS: CPT | Performed by: PHYSICAL THERAPIST

## 2025-02-24 PROCEDURE — 97530 THERAPEUTIC ACTIVITIES: CPT | Performed by: PHYSICAL THERAPIST

## 2025-02-24 PROCEDURE — 97110 THERAPEUTIC EXERCISES: CPT | Performed by: PHYSICAL THERAPIST

## 2025-02-24 NOTE — PROGRESS NOTES
Re-evaluation    Today's date: 2025  Patient name: Angela Martin  : 1985  MRN: [id number]  Referring provider: Catrachito Hernandez PA-C  Dx:   Encounter Diagnosis     ICD-10-CM    1. Adhesive capsulitis of right shoulder  M75.01             Subjective: Patient has not been seen on over 1 month. Patient reports shoulder continues to be painful when reaching behind back and full elevation. Pain in front of shoulder. Sleeping on it will also irritate it. Patient reports not being compliant with HEP.     TMJ pain left side, right cracks once in a while. Patient reports TMJ will increase when she is having migraine and has pain with cracking. Pain with chewing prior to onset of tightness. Does report right sided neck discomfort. Also has pain with opening mouth fully but no pain with yawning.     Objective: See treatment diary below    Pain  Current pain ratin  At worst pain ratin shoulder, 7/10 jaw  Aggravating factors: sleeping on shoulder or certain motions     Treatments  Previous treatment: immobilization, physical therapy and injection treatment     Short Term: met  Pt will report decreased levels of pain by at least 2 subjective ratings in 4 weeks  Pt will demonstrate improved ROM by at least 10 degrees in 4 weeks  Pt will demonstrate improved strength by 1/2 grade in 4 weeks.  Pt will be able to reach to shoulder height in 4 weeks  Long Term: met  Pt will be independent in their HEP in 8 weeks  Pt will be pain free with IADL's in 8 weeks.  Pt will display full shoulder AROM in 8 weeks.  Pt will be able to reach to overhead cabinet in 8 weeks   Pt will be able to don bra on own in 8 weeks, not met     Objective      Cervical % of normal   Flex. 100   Extn. 100   SB Left 100   SB Right 75   ROT Left 100   ROT Right 100   Repetitive testing: retraction= no change    protrusion= no change    Head positioning: slight left lateral tilt  Palpation: left masseter and lateral pterygoid      Headaches =  +              MMT          AROM           PROM     Shoulder       R       L        R          L        R        L   Flex. 5 5 160  175   Extn. 5 5 35 WNL 40 55   Abd. 5 5 150  165   Add. 5 5         IR. 5 5 25 WNL 30 60   ER. 5 5 80 WNL 90 100   Behind back IR     L4 T6                     MMT     Elbow       R       L   Flex. 5 5   Extn. 5 5                MMT     Wrist       R         L   Flex. 5 5   Extn. 5 5    bent 5 5      Posture: depressed right shoulder girdle    Assessment:  Overall patient has made improvements in strength and ROM since her surgery. She was educated on importance of performing stretching at home and verbalized understanding. Added TMJ to plan of care as she reports pain left side of face.     Pt verbally consented to dry needling treatment session to TMJ. Risks/benefits/aftercare instructions reviewed. All questions/concerns addressed.  Pt in supine position. Hand hygiene performed pre and post DN treatment session. Placement of needles in pathological tissue.   Bilateral temporalis, ptyregoids, zygomatic arches, ears (needle location).e-stim  Total treatment duration to include set up/break down/in situ: 30 minutes. Patient was supervised by clinician throughout entirety of treatment session to include when DN in situ; clinician next to patient. All needles counted upon insertion and removal-- 20 needles. All accounted for and disposed in appropriate sharp container.     No adverse reaction to treatment. Pt advised may experience soreness and increased ache. Pt verbalized understanding.     Plan: 8 weeks, 1-2 visits per week.       Insurance:  AMA/CMS Eval/ Re-eval POC expires Auth #/ Referral # Total    Start date  Expiration date Extension  Visit limitation?  PT only or  PT+OT? Co-Insurance   AMA 11.15.24 1.10.24  50V         CMS 2.24.25 4.21.25           Precautions: standard, ODELL 11.14.24  Patient provided verbal consent to treatment plan and  "recommended interventions.    Postoperative range of motion:  Forward elevation 160 degrees  External rotation at 0 to 90 degrees  External rotation at 90 degrees to 100 degrees  Internal rotation at 90 degrees to 60 degrees    Access Code: [email or web address]  URL: [email or web address]  Date: 11/15/2024  Prepared by: Vega Leung    Exercises  - Supine Shoulder Flexion Extension AAROM with Dowel  - 4 x daily - 7 x weekly - 2 sets - 10 reps  - Supine Shoulder External Rotation 90/90 with Dowel  - 4 x daily - 7 x weekly - 2 sets - 10 reps  - Standing Shoulder Abduction AAROM with Dowel  - 4 x daily - 7 x weekly - 2 sets - 10 reps  - Standing Shoulder Extension AAROM with Dowel  - 4 x daily - 7 x weekly - 2 sets - 10 reps    Manuals 12/19/24 12/24 12/30 1/6/25 1/9/25 2.24   visit 12 13 14 15 16    Scap mobs FB DM FB WM FB    Subscap release FB DM FB WM FB FB   A/p mobs FB DM FB WM FB    Dry needling      10'                              Ther Ex         PROM   FB WM FB FB   Cane OH AAROM flexion 2*10, 3\" supine, also 90/90 ER 3*10 2*10, 3\" supine, also 90/90 ER 3*10       Shld abduction AAROM 2*10        Shld extn w/IR     3*10 10x   Supine punch     2*10 S/L    Behind back IR   W/ SOS: 10*10'' SOS 3x10     Cross body add. 5*10'' 5*10''       Mod. Sleeper stretch   5*10'' 10x15\"     S/L ER         rows         Low rows         B/L ER     2*10 RTB                      Ther Activity         Pt ed      10' Dry needling                    "

## 2025-03-04 ENCOUNTER — OFFICE VISIT (OUTPATIENT)
Dept: PHYSICAL THERAPY | Facility: CLINIC | Age: 40
End: 2025-03-04
Payer: COMMERCIAL

## 2025-03-04 DIAGNOSIS — M26.629 TMJPDS (TEMPOROMANDIBULAR JOINT PAIN DYSFUNCTION SYNDROME): ICD-10-CM

## 2025-03-04 DIAGNOSIS — M75.01 ADHESIVE CAPSULITIS OF RIGHT SHOULDER: Primary | ICD-10-CM

## 2025-03-04 PROCEDURE — 97140 MANUAL THERAPY 1/> REGIONS: CPT | Performed by: PHYSICAL THERAPIST

## 2025-03-04 NOTE — PROGRESS NOTES
Daily Note     Today's date: 3/4/2025  Patient name: Angela Martin  : 1985  MRN: [id number]  Referring provider: Catrachito Hernandez PA-C  Dx:   Encounter Diagnosis     ICD-10-CM    1. Adhesive capsulitis of right shoulder  M75.01       2. TMJPDS (temporomandibular joint pain dysfunction syndrome)  M26.629                  Subjective: Patient reports that she is having hard time chewing due to bilateral TMD pain. Reports good shoulder HEP performance and discomfort continues anterior shoulder region.     Objective: See treatment diary below    Assessment: Tolerated treatment well. Patient reported left face soreness post session. Pt verbally consented to dry needling treatment session. Risks/benefits/aftercare instructions reviewed. All questions/concerns addressed.  Pt in supine position. Hand hygiene performed pre and post DN treatment session. Placement of needles in pathological tissue.   B/L temporal region, zygomatic arch, lateral pterygoids, masseters, mentalis (needle location).  Total treatment duration to include set up/break down/in situ: 38 minutes. Patient was supervised by clinician throughout entirety of treatment session to include when DN in situ; clinician next to patient. All needles counted upon insertion and removal-- 28 needles. All accounted for and disposed in appropriate sharp container.     No adverse reaction to treatment. Pt advised may experience soreness post session. Pt verbalized understanding.         Plan: Continue per plan of care.        Insurance:  AMA/CMS Eval/ Re-eval POC expires Auth #/ Referral # Total    Start date  Expiration date Extension  Visit limitation?  PT only or  PT+OT? Co-Insurance   AMA 11.15. 1.10.24  50V         CMS 2.24.25 4.21.25           Precautions: standard, ODELL 11.14.24  Patient provided verbal consent to treatment plan and recommended interventions.    Postoperative range of motion:  Forward elevation 160 degrees  External rotation at 0 to 90  "degrees  External rotation at 90 degrees to 100 degrees  Internal rotation at 90 degrees to 60 degrees    Access Code: [email or web address]  URL: [email or web address]  Date: 11/15/2024  Prepared by: Vega Leung    Exercises  - Supine Shoulder Flexion Extension AAROM with Dowel  - 4 x daily - 7 x weekly - 2 sets - 10 reps  - Supine Shoulder External Rotation 90/90 with Dowel  - 4 x daily - 7 x weekly - 2 sets - 10 reps  - Standing Shoulder Abduction AAROM with Dowel  - 4 x daily - 7 x weekly - 2 sets - 10 reps  - Standing Shoulder Extension AAROM with Dowel  - 4 x daily - 7 x weekly - 2 sets - 10 reps    Manuals 12/30 1/6/25 1/9/25 2.24 3.4   visit 14 15 16 17 18   Scap mobs FB WM FB     Subscap release FB WM FB FB    A/p mobs FB WM FB     Dry needling    10' 15'                           Ther Ex        PROM FB WM FB FB    Cane OH AAROM flexion        Shld abduction AAROM        Shld extn w/IR   3*10 10x    Supine punch   2*10 S/L     Behind back IR W/ SOS: 10*10'' SOS 3x10      Cross body add.        Mod. Sleeper stretch 5*10'' 10x15\"      B/L ER   2*10 RTB                     Ther Activity        Pt ed    10' Dry needling                      "

## 2025-03-07 ENCOUNTER — OFFICE VISIT (OUTPATIENT)
Dept: OBGYN CLINIC | Facility: CLINIC | Age: 40
End: 2025-03-07

## 2025-03-07 VITALS
WEIGHT: 131.8 LBS | BODY MASS INDEX: 24.25 KG/M2 | DIASTOLIC BLOOD PRESSURE: 60 MMHG | HEIGHT: 62 IN | SYSTOLIC BLOOD PRESSURE: 100 MMHG

## 2025-03-07 DIAGNOSIS — Z09 POSTOPERATIVE FOLLOW-UP: Primary | ICD-10-CM

## 2025-03-07 DIAGNOSIS — N39.3 SUI (STRESS URINARY INCONTINENCE, FEMALE): ICD-10-CM

## 2025-03-07 DIAGNOSIS — M62.89 HIGH-TONE PELVIC FLOOR DYSFUNCTION IN FEMALE: ICD-10-CM

## 2025-03-07 PROCEDURE — 99213 OFFICE O/P EST LOW 20 MIN: CPT | Performed by: STUDENT IN AN ORGANIZED HEALTH CARE EDUCATION/TRAINING PROGRAM

## 2025-03-07 NOTE — PROGRESS NOTES
OB/GYN VISIT  Angela Martin  3/7/2025  9:54 AM    ASSESSMENT / PLAN:    Angela Martin is a 39 y.o.  female presenting for 2wk post-op check s/p PV sling and pelvic floor botox injections.    Denies LUIS ALFREDO. No pain.   Continue pelvic rest. Limit heavy lifting. May resume baths, swimming.   To follow up in 4-5 weeks.     SUBJECTIVE:    Angela Martin is a 39 y.o.  female presenting for post op follow up after a PV sling and pelvic floor botox on 25. Doing well. LUIS ALFREDO has resolved. Notes groin pain and pain with ambulation has completely resolved. Minimal spotting which was also resolved. Denies any concerns.     Past Medical History:   Diagnosis Date    ADHD (attention deficit hyperactivity disorder)     Anemia     KVNG (generalized anxiety disorder)     History of transfusion 10/03/2008    Hemorrhage during child birth    Memory loss     Migraines     Miscarriage     , , ,        Past Surgical History:   Procedure Laterality Date     SECTION  12    DILATION AND CURETTAGE OF UTERUS      2005    KIDNEY SURGERY Right 2003    Hydronephorsis while pregnant (pardon the spelling)    CT CYSTOURETHROSCOPY N/A 2024    Procedure: CYSTOSCOPY;  Surgeon: Mckenna Barcenas MD;  Location: [facility];  Service: Gynecology    CT CYSTOURETHROSCOPY N/A 2025    Procedure: CYSTO;  Surgeon: Justen Norman MD;  Location: [facility];  Service: Gynecology    CT LAPS W/VAG HYSTERECT 250 GM/&RMVL TUBE&/OVARIES Bilateral 2024    Procedure: HYSTERECTOMY LAPAROSCOPIC ASSISTED VAGINAL (LAVH) WITH BILATERAL SALPINGECTOMY, EXAM UNDER ANESTHESIA;  Surgeon: Mckenna Barcenas MD;  Location: [facility];  Service: Gynecology    CT SLING OPERATION STRESS INCONTINENCE N/A 2025    Procedure: INSERTION PV SLING (FEMALE)  ( ALTIS)   & EUA, injection of pelvic floor injectable botox;  Surgeon: Justen Norman MD;  Location: [facility];  Service: Gynecology    CT SURGICAL ARTHROSCOPY SHOULDER W/LSS&RESCJ  "ADS Right 11/14/2024    Procedure: ARTHROSCOPY SHOULDER, Lysis of Adhesions, subacromial decompression, Manipulation Under Anesthesia;  Surgeon: Bret Azul MD;  Location: [facility];  Service: Orthopedics    TUBAL LIGATION Bilateral 2012       OBJECTIVE:    Vitals:  Blood pressure 100/60, height 5' 2\" (1.575 m), weight 59.8 kg (131 lb 12.8 oz), last menstrual period 12/01/2024.Body mass index is 24.11 kg/m².    Physical Exam:    Physical Exam  Constitutional:       Appearance: Normal appearance. She is normal weight.   Genitourinary:      Genitourinary Comments: Vaginal incision healing well. Visible vicryl suture. No erythema, tenderness or abnormal drainage.   Cardiovascular:      Rate and Rhythm: Normal rate.   Pulmonary:      Effort: Pulmonary effort is normal.   Neurological:      Mental Status: She is alert and oriented to person, place, and time.   Skin:     General: Skin is warm and dry.   Psychiatric:         Mood and Affect: Mood normal.         Behavior: Behavior normal.         Thought Content: Thought content normal.         Judgment: Judgment normal.           Justen Norman MD  3/7/2025  9:54 AM       "

## 2025-03-10 ENCOUNTER — OFFICE VISIT (OUTPATIENT)
Dept: PHYSICAL THERAPY | Facility: CLINIC | Age: 40
End: 2025-03-10
Payer: COMMERCIAL

## 2025-03-10 DIAGNOSIS — M75.01 ADHESIVE CAPSULITIS OF RIGHT SHOULDER: ICD-10-CM

## 2025-03-10 DIAGNOSIS — M26.629 TMJPDS (TEMPOROMANDIBULAR JOINT PAIN DYSFUNCTION SYNDROME): Primary | ICD-10-CM

## 2025-03-10 PROCEDURE — 97140 MANUAL THERAPY 1/> REGIONS: CPT | Performed by: PHYSICAL THERAPIST

## 2025-03-10 PROCEDURE — 97530 THERAPEUTIC ACTIVITIES: CPT | Performed by: PHYSICAL THERAPIST

## 2025-03-10 NOTE — PROGRESS NOTES
Daily Note     Today's date: 3/10/2025  Patient name: Angela Martin  : 1985  MRN: [id number]  Referring provider: Catrachito Hernandez PA-C  Dx:   Encounter Diagnosis     ICD-10-CM    1. TMJPDS (temporomandibular joint pain dysfunction syndrome)  M26.629       2. Adhesive capsulitis of right shoulder  M75.01       Start Time: 1630  Stop Time: 1710  Total time in clinic (min): 40 minutes    Subjective: Patient reports continued discomfort with opening her mouth and chewing.     Objective: See treatment diary below    Assessment: Tolerated treatment well. Patient reported slight improvement in mouth opening after shown TMJD specific exercise. Pt verbally consented to dry needling treatment session. Risks/benefits/aftercare instructions reviewed. All questions/concerns addressed.  Pt in supine position. Hand hygiene performed pre and post DN treatment session. Placement of needles in pathological tissue.   B/L temporal region, zygomatic arch, lateral pterygoids, masseters, mentalis (needle location).  Total treatment duration to include set up/break down/in situ: 38 minutes. Patient was supervised by clinician throughout entirety of treatment session to include when DN in situ; clinician next to patient. All needles counted upon insertion and removal-- 20 needles. All accounted for and disposed in appropriate sharp container.     No adverse reaction to treatment. Pt advised may experience soreness post session. Pt verbalized understanding.     Plan: Continue per plan of care.        Insurance:  AMA/CMS Eval/ Re-eval POC expires Auth #/ Referral # Total    Start date  Expiration date Extension  Visit limitation?  PT only or  PT+OT? Co-Insurance   AMA 11.15.24 1.10.24  50V         CMS 2.24.25 4..           Precautions: standard, ODELL 11.14.24  Patient provided verbal consent to treatment plan and recommended interventions.    Postoperative range of motion:  Forward elevation 160 degrees  External rotation at 0 to  "90 degrees  External rotation at 90 degrees to 100 degrees  Internal rotation at 90 degrees to 60 degrees    Access Code: [email or web address]  URL: [email or web address]  Date: 11/15/2024  Prepared by: Vega Leung    Exercises  - Supine Shoulder Flexion Extension AAROM with Dowel  - 4 x daily - 7 x weekly - 2 sets - 10 reps  - Supine Shoulder External Rotation 90/90 with Dowel  - 4 x daily - 7 x weekly - 2 sets - 10 reps  - Standing Shoulder Abduction AAROM with Dowel  - 4 x daily - 7 x weekly - 2 sets - 10 reps  - Standing Shoulder Extension AAROM with Dowel  - 4 x daily - 7 x weekly - 2 sets - 10 reps    Manuals 1/6/25 1/9/25 2.24 3.4 3.10   visit 15 16 17 18 19   Scap mobs WM FB      Subscap release WM FB FB     A/p mobs WM FB      Dry needling   10' 15' 12'                           Ther Ex        PROM WM FB FB     Cane OH AAROM flexion        Shld abduction AAROM        Shld extn w/IR  3*10 10x     Supine punch  2*10 S/L      Behind back IR SOS 3x10       Cross body add.        Mod. Sleeper stretch 10x15\"       B/L ER  2*10 RTB                      Ther Activity        Pt ed   10' Dry needling  8' about HEP for TMJD                     "

## 2025-03-11 ENCOUNTER — APPOINTMENT (OUTPATIENT)
Dept: PHYSICAL THERAPY | Facility: CLINIC | Age: 40
End: 2025-03-11
Payer: COMMERCIAL

## 2025-03-17 ENCOUNTER — APPOINTMENT (EMERGENCY)
Dept: RADIOLOGY | Facility: HOSPITAL | Age: 40
End: 2025-03-17
Payer: COMMERCIAL

## 2025-03-17 ENCOUNTER — APPOINTMENT (OUTPATIENT)
Dept: PHYSICAL THERAPY | Facility: CLINIC | Age: 40
End: 2025-03-17
Payer: COMMERCIAL

## 2025-03-17 ENCOUNTER — HOSPITAL ENCOUNTER (EMERGENCY)
Facility: HOSPITAL | Age: 40
Discharge: HOME | End: 2025-03-17
Attending: EMERGENCY MEDICINE | Admitting: EMERGENCY MEDICINE
Payer: COMMERCIAL

## 2025-03-17 VITALS
TEMPERATURE: 98.1 F | OXYGEN SATURATION: 98 % | HEART RATE: 76 BPM | DIASTOLIC BLOOD PRESSURE: 62 MMHG | SYSTOLIC BLOOD PRESSURE: 104 MMHG | RESPIRATION RATE: 16 BRPM

## 2025-03-17 DIAGNOSIS — D64.9 ANEMIA: ICD-10-CM

## 2025-03-17 DIAGNOSIS — R55 NEAR SYNCOPE: Primary | ICD-10-CM

## 2025-03-17 DIAGNOSIS — R07.81 RIB PAIN ON LEFT SIDE: ICD-10-CM

## 2025-03-17 LAB
ANION GAP SERPL CALCULATED.3IONS-SCNC: 9 MMOL/L (ref 4–13)
APTT PPP: 35 SECONDS (ref 23–34)
ATRIAL RATE: 73 BPM
BASOPHILS # BLD AUTO: 0.08 THOUSANDS/ÂΜL (ref 0–0.1)
BASOPHILS NFR BLD AUTO: 2 % (ref 0–1)
BUN SERPL-MCNC: 17 MG/DL (ref 5–25)
CALCIUM SERPL-MCNC: 9 MG/DL (ref 8.4–10.2)
CARDIAC TROPONIN I PNL SERPL HS: <2 NG/L (ref ?–50)
CHLORIDE SERPL-SCNC: 107 MMOL/L (ref 96–108)
CO2 SERPL-SCNC: 23 MMOL/L (ref 21–32)
CREAT SERPL-MCNC: 0.59 MG/DL (ref 0.6–1.3)
D DIMER PPP FEU-MCNC: 0.65 UG/ML FEU
EOSINOPHIL # BLD AUTO: 0.18 THOUSAND/ÂΜL (ref 0–0.61)
EOSINOPHIL NFR BLD AUTO: 3 % (ref 0–6)
ERYTHROCYTE [DISTWIDTH] IN BLOOD BY AUTOMATED COUNT: 15.6 % (ref 11.6–15.1)
EXT PREGNANCY TEST URINE: NEGATIVE
EXT. CONTROL: NORMAL
GFR SERPL CREATININE-BSD FRML MDRD: 115 ML/MIN/1.73SQ M
GLUCOSE SERPL-MCNC: 88 MG/DL (ref 65–140)
HCT VFR BLD AUTO: 33.8 % (ref 34.8–46.1)
HGB BLD-MCNC: 10.8 G/DL (ref 11.5–15.4)
IMM GRANULOCYTES # BLD AUTO: 0.01 THOUSAND/UL (ref 0–0.2)
IMM GRANULOCYTES NFR BLD AUTO: 0 % (ref 0–2)
INR PPP: 1.02 (ref 0.85–1.19)
LYMPHOCYTES # BLD AUTO: 1.69 THOUSANDS/ÂΜL (ref 0.6–4.47)
LYMPHOCYTES NFR BLD AUTO: 31 % (ref 14–44)
MCH RBC QN AUTO: 24.5 PG (ref 26.8–34.3)
MCHC RBC AUTO-ENTMCNC: 32 G/DL (ref 31.4–37.4)
MCV RBC AUTO: 77 FL (ref 82–98)
MONOCYTES # BLD AUTO: 0.42 THOUSAND/ÂΜL (ref 0.17–1.22)
MONOCYTES NFR BLD AUTO: 8 % (ref 4–12)
NEUTROPHILS # BLD AUTO: 3.13 THOUSANDS/ÂΜL (ref 1.85–7.62)
NEUTS SEG NFR BLD AUTO: 56 % (ref 43–75)
NRBC BLD AUTO-RTO: 0 /100 WBCS
P AXIS: 54 DEGREES
PLATELET # BLD AUTO: 254 THOUSANDS/UL (ref 149–390)
PMV BLD AUTO: 11.8 FL (ref 8.9–12.7)
POTASSIUM SERPL-SCNC: 4 MMOL/L (ref 3.5–5.3)
PR INTERVAL: 136 MS
PROTHROMBIN TIME: 13.9 SECONDS (ref 12.3–15)
QRS AXIS: 60 DEGREES
QRSD INTERVAL: 80 MS
QT INTERVAL: 356 MS
QTC INTERVAL: 392 MS
RBC # BLD AUTO: 4.4 MILLION/UL (ref 3.81–5.12)
SODIUM SERPL-SCNC: 139 MMOL/L (ref 135–147)
T WAVE AXIS: 36 DEGREES
VENTRICULAR RATE: 73 BPM
WBC # BLD AUTO: 5.51 THOUSAND/UL (ref 4.31–10.16)

## 2025-03-17 PROCEDURE — 99285 EMERGENCY DEPT VISIT HI MDM: CPT

## 2025-03-17 PROCEDURE — 36415 COLL VENOUS BLD VENIPUNCTURE: CPT | Performed by: PHYSICIAN ASSISTANT

## 2025-03-17 PROCEDURE — 93010 ELECTROCARDIOGRAM REPORT: CPT | Performed by: INTERNAL MEDICINE

## 2025-03-17 PROCEDURE — 71275 CT ANGIOGRAPHY CHEST: CPT

## 2025-03-17 PROCEDURE — 81025 URINE PREGNANCY TEST: CPT | Performed by: PHYSICIAN ASSISTANT

## 2025-03-17 PROCEDURE — 96360 HYDRATION IV INFUSION INIT: CPT

## 2025-03-17 PROCEDURE — 85025 COMPLETE CBC W/AUTO DIFF WBC: CPT | Performed by: PHYSICIAN ASSISTANT

## 2025-03-17 PROCEDURE — 85610 PROTHROMBIN TIME: CPT | Performed by: PHYSICIAN ASSISTANT

## 2025-03-17 PROCEDURE — 85379 FIBRIN DEGRADATION QUANT: CPT | Performed by: PHYSICIAN ASSISTANT

## 2025-03-17 PROCEDURE — 85730 THROMBOPLASTIN TIME PARTIAL: CPT | Performed by: PHYSICIAN ASSISTANT

## 2025-03-17 PROCEDURE — 99285 EMERGENCY DEPT VISIT HI MDM: CPT | Performed by: PHYSICIAN ASSISTANT

## 2025-03-17 PROCEDURE — 84484 ASSAY OF TROPONIN QUANT: CPT | Performed by: PHYSICIAN ASSISTANT

## 2025-03-17 PROCEDURE — 80048 BASIC METABOLIC PNL TOTAL CA: CPT | Performed by: PHYSICIAN ASSISTANT

## 2025-03-17 PROCEDURE — 93005 ELECTROCARDIOGRAM TRACING: CPT

## 2025-03-17 RX ADMIN — IOHEXOL 85 ML: 350 INJECTION, SOLUTION INTRAVENOUS at 10:51

## 2025-03-17 RX ADMIN — SODIUM CHLORIDE 1000 ML: 0.9 INJECTION, SOLUTION INTRAVENOUS at 10:18

## 2025-03-17 NOTE — ED PROVIDER NOTES
Time reflects when diagnosis was documented in both MDM as applicable and the Disposition within this note       Time User Action Codes Description Comment    3/17/2025 11:50 AM Stephanie Maloney [R55] Near syncope     3/17/2025 11:50 AM Stephanie Maloney [R07.81] Rib pain on left side     3/17/2025 11:50 AM Stephanie Maloney [D64.9] Anemia           ED Disposition       ED Disposition   Discharge    Condition   Stable    Date/Time   Mon Mar 17, 2025 11:50 AM    Comment   Angela Martin discharge to home/self care.                   Assessment & Plan       Medical Decision Making  Thorough conversation with patient regarding labs. Hg 10.8. she has a h/o this however recently underwent hysterectomy as heavy vaginal bleeding that was a contributory cause. Recommend she f/u with her hematologist or PCP for this.  Given rib pain with deep and elation and presyncopal event, D-dimer was ordered and was slightly elevated, CTA negative for PE.  Patient has history of left-sided rib pain as well as presyncope/syncopal events.     Patient is informed to return to the emergency department for worsening of symptoms and was given proper education regarding their diagnosis and symptoms. Otherwise the patient is informed to follow up with their primary care doctor for re-evaluation. The patient verbalizes understanding and agrees with above assessment and plan. All questions were answered.        Amount and/or Complexity of Data Reviewed  Labs: ordered. Decision-making details documented in ED Course.  Radiology: ordered.    Risk  Prescription drug management.        ED Course as of 03/17/25 1153   Mon Mar 17, 2025   1034 Hemoglobin(!): 10.8  History of anemia, low compared to most previous labs   1051 Updated patient and family        Medications   sodium chloride 0.9 % bolus 1,000 mL (1,000 mL Intravenous New Bag 3/17/25 1018)   iohexol (OMNIPAQUE) 350 MG/ML injection (MULTI-DOSE) 85 mL (85 mL Intravenous Given  3/17/25 1051)       ED Risk Strat Scores                                                History of Present Illness       Chief Complaint   Patient presents with    Syncope     Pt reports frequent bouts of syncope, had one this am where she reports her  caught her after some left sided rib pain       Past Medical History:   Diagnosis Date    ADHD (attention deficit hyperactivity disorder)     Anemia     KVNG (generalized anxiety disorder)     History of transfusion 10/03/2008    Hemorrhage during child birth    Memory loss     Migraines     Miscarriage     , , ,       Past Surgical History:   Procedure Laterality Date     SECTION  12    DILATION AND CURETTAGE OF UTERUS          KIDNEY SURGERY Right     Hydronephorsis while pregnant (pardon the spelling)    MA CYSTOURETHROSCOPY N/A 2024    Procedure: CYSTOSCOPY;  Surgeon: Mckenna Barcenas MD;  Location: [facility];  Service: Gynecology    MA CYSTOURETHROSCOPY N/A 2025    Procedure: CYSTO;  Surgeon: Justen Norman MD;  Location: [facility];  Service: Gynecology    MA LAPS W/VAG HYSTERECT 250 GM/&RMVL TUBE&/OVARIES Bilateral 2024    Procedure: HYSTERECTOMY LAPAROSCOPIC ASSISTED VAGINAL (LAVH) WITH BILATERAL SALPINGECTOMY, EXAM UNDER ANESTHESIA;  Surgeon: Mckenna Barcenas MD;  Location: [facility];  Service: Gynecology    MA SLING OPERATION STRESS INCONTINENCE N/A 2025    Procedure: INSERTION PV SLING (FEMALE)  ( ALTIS)   & EUA, injection of pelvic floor injectable botox;  Surgeon: Justen Norman MD;  Location: [facility];  Service: Gynecology    MA SURGICAL ARTHROSCOPY SHOULDER W/LSS&RESCJ ADS Right 2024    Procedure: ARTHROSCOPY SHOULDER, Lysis of Adhesions, subacromial decompression, Manipulation Under Anesthesia;  Surgeon: Bret Azul MD;  Location: [facility];  Service: Orthopedics    TUBAL LIGATION Bilateral       Family History   Problem Relation Age of Onset    COPD Mother      Endometriosis Mother     Diabetes Mother     Learning disabilities Mother     Neurological problems Mother     Asthma Mother     Heart disease Mother     Seizures Mother     No Known Problems Father     Learning disabilities Sister     Neurological problems Sister     No Known Problems Sister     No Known Problems Sister     No Known Problems Daughter       Social History     Tobacco Use    Smoking status: Never     Passive exposure: Never    Smokeless tobacco: Never   Vaping Use    Vaping status: Never Used   Substance Use Topics    Alcohol use: Not Currently     Alcohol/week: 1.0 standard drink of alcohol     Types: 1 Standard drinks or equivalent per week     Comment: rare    Drug use: Never      E-Cigarette/Vaping    E-Cigarette Use Never User       E-Cigarette/Vaping Substances    Nicotine No     THC No     CBD No     Flavoring No     Other No     Unknown No       I have reviewed and agree with the history as documented.     40 y/o female presenting today for evaluation of left-sided rib pain accompanied with subsequent presyncopal event.  Patient relays that she often will get left lower rib pain that will last approximately 10 seconds however today it lasted around 4 minutes, states that it is sharp worsens with deep inhalation and feels as though her rib needs to crack.  She also often will have syncopal events, it is not the first time she has had near syncope.  Relays that due to the pain she had a hard time taking a deep breath and felt as though she was going to pass out, her  was right next to her who helped lower her to the ground she then shortly became more alert, no tongue biting or urinary incontinence and did not fully lose consciousness.  She no longer has rib pain.  Has otherwise had a normal week however over the past 6 months has had a few procedures including a hysterectomy as well as a band procedure last month.  Currently denies any other symptoms other than fatigue.  Patient relays  that she does get anxious regarding potential clots.  States that she does no history of smoking and is on no estrogen related compounds, no recent travel however does sit at his desk. denies chest pain, shortness of breath, calf pain or swelling, diaphoresis, fevers, nausea, vomiting, abdominal pain etc.  Differential clues but is not limited to syncope, arrhythmia, dehydration, rib pain etc.        Review of Systems   Constitutional: Negative.  Negative for chills, fatigue and fever.   HENT: Negative.  Negative for congestion, postnasal drip, rhinorrhea and sore throat.    Eyes: Negative.    Respiratory: Negative.  Negative for cough, shortness of breath and wheezing.         Left rib pain      Cardiovascular: Negative.    Gastrointestinal: Negative.  Negative for abdominal pain, diarrhea, nausea and vomiting.   Endocrine: Negative.    Genitourinary: Negative.    Musculoskeletal: Negative.    Skin: Negative.    Neurological:  Positive for syncope. Negative for dizziness, tremors, seizures, facial asymmetry, speech difficulty, weakness, light-headedness, numbness and headaches.   Hematological: Negative.    Psychiatric/Behavioral: Negative.     All other systems reviewed and are negative.          Objective       ED Triage Vitals [03/17/25 0943]   Temperature Pulse Blood Pressure Respirations SpO2 Patient Position - Orthostatic VS   98.1 °F (36.7 °C) 76 104/62 16 98 % Lying      Temp Source Heart Rate Source BP Location FiO2 (%) Pain Score    Oral Monitor Right arm -- --      Vitals      Date and Time Temp Pulse SpO2 Resp BP Pain Score FACES Pain Rating User   03/17/25 0943 98.1 °F (36.7 °C) 76 98 % 16 104/62 -- -- LG            Physical Exam  Vitals and nursing note reviewed.   Constitutional:       General: She is not in acute distress.     Appearance: Normal appearance. She is well-developed and normal weight. She is not diaphoretic.   HENT:      Head: Normocephalic and atraumatic.      Right Ear: External ear  normal.      Left Ear: External ear normal.      Nose: Nose normal.      Mouth/Throat:      Pharynx: No oropharyngeal exudate.   Eyes:      General: No scleral icterus.        Right eye: No discharge.         Left eye: No discharge.      Conjunctiva/sclera: Conjunctivae normal.      Pupils: Pupils are equal, round, and reactive to light.   Cardiovascular:      Rate and Rhythm: Normal rate and regular rhythm.      Pulses: Normal pulses.      Heart sounds: Normal heart sounds. No murmur heard.     No friction rub. No gallop.   Pulmonary:      Effort: Pulmonary effort is normal. No respiratory distress.      Breath sounds: Normal breath sounds. No stridor. No wheezing, rhonchi or rales.   Chest:      Chest wall: No tenderness.   Abdominal:      General: Abdomen is flat. Bowel sounds are normal. There is no distension.      Palpations: Abdomen is soft. There is no mass.      Tenderness: There is no abdominal tenderness. There is no right CVA tenderness, left CVA tenderness, guarding or rebound.      Hernia: No hernia is present.   Musculoskeletal:         General: No swelling or tenderness.      Cervical back: Normal range of motion and neck supple.      Right lower leg: No edema.      Left lower leg: No edema.   Lymphadenopathy:      Cervical: No cervical adenopathy.   Skin:     General: Skin is warm and dry.      Capillary Refill: Capillary refill takes less than 2 seconds.      Coloration: Skin is not pale.      Findings: No erythema or rash.   Neurological:      General: No focal deficit present.      Mental Status: She is alert and oriented to person, place, and time. Mental status is at baseline.   Psychiatric:         Mood and Affect: Mood normal.         Thought Content: Thought content normal.         Judgment: Judgment normal.         Results Reviewed       Procedure Component Value Units Date/Time    HS Troponin 0hr (reflex protocol) [[id number]]  (Normal) Collected: 03/17/25 1017    Lab Status: Final result  Specimen: Blood from Arm, Left Updated: 03/17/25 1052     hs TnI 0hr <2 ng/L     Basic metabolic panel [[id number]]  (Abnormal) Collected: 03/17/25 1017    Lab Status: Final result Specimen: Blood from Arm, Left Updated: 03/17/25 1044     Sodium 139 mmol/L      Potassium 4.0 mmol/L      Chloride 107 mmol/L      CO2 23 mmol/L      ANION GAP 9 mmol/L      BUN 17 mg/dL      Creatinine 0.59 mg/dL      Glucose 88 mg/dL      Calcium 9.0 mg/dL      eGFR 115 ml/min/1.73sq m     Narrative:      National Kidney Disease Foundation guidelines for Chronic Kidney Disease (CKD):     Stage 1 with normal or high GFR (GFR > 90 mL/min/1.73 square meters)    Stage 2 Mild CKD (GFR = 60-89 mL/min/1.73 square meters)    Stage 3A Moderate CKD (GFR = 45-59 mL/min/1.73 square meters)    Stage 3B Moderate CKD (GFR = 30-44 mL/min/1.73 square meters)    Stage 4 Severe CKD (GFR = 15-29 mL/min/1.73 square meters)    Stage 5 End Stage CKD (GFR <15 mL/min/1.73 square meters)  Note: GFR calculation is accurate only with a steady state creatinine    D-dimer, quantitative [[id number]]  (Abnormal) Collected: 03/17/25 1017    Lab Status: Final result Specimen: Blood from Arm, Left Updated: 03/17/25 1042     D-Dimer, Quant 0.65 ug/ml FEU     Protime-INR [[id number]]  (Normal) Collected: 03/17/25 1017    Lab Status: Final result Specimen: Blood from Arm, Left Updated: 03/17/25 1041     Protime 13.9 seconds      INR 1.02    Narrative:      INR Therapeutic Range    Indication                                             INR Range      Atrial Fibrillation                                               2.0-3.0  Hypercoagulable State                                    2.0.2.3  Left Ventricular Asist Device                            2.0-3.0  Mechanical Heart Valve                                  -    Aortic(with afib, MI, embolism, HF, LA enlargement,    and/or coagulopathy)                                     2.0-3.0 (2.5-3.5)     Mitral                                                              2.5-3.5  Prosthetic/Bioprosthetic Heart Valve               2.0-3.0  Venous thromboembolism (VTE: VT, PE        2.0-3.0    APTT [[id number]]  (Abnormal) Collected: 03/17/25 1017    Lab Status: Final result Specimen: Blood from Arm, Left Updated: 03/17/25 1041     PTT 35 seconds     CBC and differential [[id number]]  (Abnormal) Collected: 03/17/25 1017    Lab Status: Final result Specimen: Blood from Arm, Left Updated: 03/17/25 1031     WBC 5.51 Thousand/uL      RBC 4.40 Million/uL      Hemoglobin 10.8 g/dL      Hematocrit 33.8 %      MCV 77 fL      MCH 24.5 pg      MCHC 32.0 g/dL      RDW 15.6 %      MPV 11.8 fL      Platelets 254 Thousands/uL      nRBC 0 /100 WBCs      Segmented % 56 %      Immature Grans % 0 %      Lymphocytes % 31 %      Monocytes % 8 %      Eosinophils Relative 3 %      Basophils Relative 2 %      Absolute Neutrophils 3.13 Thousands/µL      Absolute Immature Grans 0.01 Thousand/uL      Absolute Lymphocytes 1.69 Thousands/µL      Absolute Monocytes 0.42 Thousand/µL      Eosinophils Absolute 0.18 Thousand/µL      Basophils Absolute 0.08 Thousands/µL     POCT pregnancy, urine [[id number]]  (Normal) Collected: 03/17/25 1020    Lab Status: Final result Updated: 03/17/25 1021     EXT Preg Test, Ur Negative     Control Valid            CTA chest pe study   Final Interpretation by Kalen Lainez MD (03/17 1133)      No evidence for pulmonary embolism.                  Workstation performed: [id number]             ECG 12 Lead Documentation Only    Date/Time: 3/17/2025 10:30 AM    Performed by: Stephanie Maloney PA-C  Authorized by: Stephanie Maloney PA-C    Indications / Diagnosis:  Rib pain, presyncope  ECG reviewed by me, the ED Provider: yes    Patient location:  ED  Interpretation:     Interpretation: normal    Rate:     ECG rate:  73    ECG rate assessment: normal    Rhythm:     Rhythm: sinus rhythm    Ectopy:     Ectopy: none    QRS:     QRS axis:  Normal    QRS  intervals:  Normal  Conduction:     Conduction: normal    ST segments:     ST segments:  Normal  T waves:     T waves: normal        ED Medication and Procedure Management   Prior to Admission Medications   Prescriptions Last Dose Informant Patient Reported? Taking?   aspirin-acetaminophen-caffeine (EXCEDRIN MIGRAINE) 250-250-65 MG per tablet   Yes No   Sig: Take 1 tablet by mouth every 6 (six) hours as needed for headaches   Patient not taking: Reported on 3/7/2025   cyclobenzaprine (FLEXERIL) 10 mg tablet   No No   Sig: Take 1 tablet (10 mg total) by mouth 3 (three) times a day as needed for muscle spasms   Patient not taking: Reported on 3/7/2025   lisdexamfetamine (VYVANSE) 20 MG capsule   No No   Sig: Take 1 capsule (20 mg total) by mouth every morning Max Daily Amount: 20 mg      Facility-Administered Medications: None     Patient's Medications   Discharge Prescriptions    No medications on file     No discharge procedures on file.  ED SEPSIS DOCUMENTATION   Time reflects when diagnosis was documented in both MDM as applicable and the Disposition within this note       Time User Action Codes Description Comment    3/17/2025 11:50 AM Stephanie Maloney [R55] Near syncope     3/17/2025 11:50 AM Stephanie Maloney [R07.81] Rib pain on left side     3/17/2025 11:50 AM Stephanie Maloney [D64.9] Anemia                  Stephanie Maloney PA-C  03/17/25 1153

## 2025-03-17 NOTE — Clinical Note
Angela Martin was seen and treated in our emergency department on 3/17/2025.                Diagnosis:     Angela  may return to work on return date.    She may return on this date: 03/18/2025         If you have any questions or concerns, please don't hesitate to call.      Darnell Emery RN    ______________________________           _______________          _______________  Hospital Representative                              Date                                Time

## 2025-03-19 ENCOUNTER — OFFICE VISIT (OUTPATIENT)
Dept: PHYSICAL THERAPY | Facility: CLINIC | Age: 40
End: 2025-03-19
Payer: COMMERCIAL

## 2025-03-19 DIAGNOSIS — M26.629 TMJPDS (TEMPOROMANDIBULAR JOINT PAIN DYSFUNCTION SYNDROME): Primary | ICD-10-CM

## 2025-03-19 PROCEDURE — 97112 NEUROMUSCULAR REEDUCATION: CPT

## 2025-03-19 PROCEDURE — 97140 MANUAL THERAPY 1/> REGIONS: CPT

## 2025-03-20 NOTE — PROGRESS NOTES
"Daily Note     Today's date: 3/19/2025  Patient name: Angela Martin  : 1985  MRN: [id number]  Referring provider: Catrachito Hernandez PA-C  Dx:   Encounter Diagnosis     ICD-10-CM    1. TMJPDS (temporomandibular joint pain dysfunction syndrome)  M26.629                  Subjective: Patient reports slight improvement in jaw pain and opening ROM with disc recapturing exercise.      Objective: See treatment diary below      Assessment: Tolerated treatment well. Performed manual distraction which helped some symptoms, but did not improve range. Patient reports end range opening as \"tight\". Educated her on passive stretching with emphasis on TERT similar to what she experienced with adhesive capsulitis. Provided popsicle sticks for HEP. Also educated on [place] 6x6 fore HEP to encourage reprogramming of TMJ. Will continue to advance as tolerated.      Plan: Continue per plan of care.        Insurance:  [employer]/CMS Eval/ Re-eval POC expires Auth #/ Referral # Total    Start date  Expiration date Extension  Visit limitation?  PT only or  PT+OT? Co-Insurance   AMA 11.15.24 1.10.24  50V         CMS 2.24.25 4.21.25           Precautions: standard, ODELL 11.14.24  Patient provided verbal consent to treatment plan and recommended interventions.    Postoperative range of motion:  Forward elevation 160 degrees  External rotation at 0 to 90 degrees  External rotation at 90 degrees to 100 degrees  Internal rotation at 90 degrees to 60 degrees    Access Code: [email or web address]  URL: [email or web address]  Date: 11/15/2024  Prepared by: Vega Leung    Exercises  - Supine Shoulder Flexion Extension AAROM with Dowel  - 4 x daily - 7 x weekly - 2 sets - 10 reps  - Supine Shoulder External Rotation 90/90 with Dowel  - 4 x daily - 7 x weekly - 2 sets - 10 reps  - Standing Shoulder Abduction AAROM with Dowel  - 4 x daily - 7 x weekly - 2 sets - 10 reps  - Standing Shoulder Extension AAROM with Dowel  - 4 x daily - 7 x weekly - 2 sets " - 10 reps    Manuals 1/9/25 2.24 3.4 3.10 3/19/25   visit 16 17 18 19 20   Scap mobs FB       Subscap release FB FB      A/p mobs FB       Dry needling  10' 15' 12'    TMJ     Distraction 3 x 30 sec    U/L mob 3 x 30 sec                   Ther Ex        PROM FB FB      Cane OH AAROM flexion        Shld abduction AAROM        Shld extn w/IR 3*10 10x      Supine punch 2*10 S/L       Behind back IR        Cross body add.        Mod. Sleeper stretch        B/L ER 2*10 RTB               MANUELE        [place] 6x6     Edu and perform 2x per day   Passive jaw stretching     Educated on min 2 min every day           Ther Activity        Pt ed  10' Dry needling  8' about HEP for TMJD

## 2025-04-04 ENCOUNTER — OFFICE VISIT (OUTPATIENT)
Dept: OBGYN CLINIC | Facility: CLINIC | Age: 40
End: 2025-04-04

## 2025-04-04 VITALS
WEIGHT: 129.6 LBS | BODY MASS INDEX: 23.85 KG/M2 | HEIGHT: 62 IN | DIASTOLIC BLOOD PRESSURE: 68 MMHG | SYSTOLIC BLOOD PRESSURE: 102 MMHG

## 2025-04-04 DIAGNOSIS — N39.3 SUI (STRESS URINARY INCONTINENCE, FEMALE): Primary | ICD-10-CM

## 2025-04-04 DIAGNOSIS — Z09 POSTOPERATIVE FOLLOW-UP: ICD-10-CM

## 2025-04-04 PROCEDURE — 99024 POSTOP FOLLOW-UP VISIT: CPT | Performed by: STUDENT IN AN ORGANIZED HEALTH CARE EDUCATION/TRAINING PROGRAM

## 2025-04-04 NOTE — PROGRESS NOTES
OB/GYN VISIT  Angela Martin  2025  8:31 AM    ASSESSMENT / PLAN:    Angela Martin is a 39 y.o.  female presenting for 6wk post-op check s/p PV sling and pelvic floor botox injections.    Denies LUIS ALFREDO. No pain.   May resume all normal pelvic activity  To follow up PRN, may resume care with general GYN    SUBJECTIVE:    Angela Martin is a 39 y.o.  female presenting for post op follow up after a PV sling and pelvic floor botox on 25. Doing well. LUIS ALFREDO has resolved. Denies any pain. Able to empty her bladder well. At times has to strain, but not often.     Past Medical History:   Diagnosis Date    ADHD (attention deficit hyperactivity disorder)     Anemia     KVNG (generalized anxiety disorder)     History of transfusion 10/03/2008    Hemorrhage during child birth    Memory loss     Migraines     Miscarriage     , , ,        Past Surgical History:   Procedure Laterality Date     SECTION  12    DILATION AND CURETTAGE OF UTERUS          KIDNEY SURGERY Right     Hydronephorsis while pregnant (pardon the spelling)    AK CYSTOURETHROSCOPY N/A 2024    Procedure: CYSTOSCOPY;  Surgeon: Mckenna Barcenas MD;  [facility];  Service: Gynecology    AK CYSTOURETHROSCOPY N/A 2025    Procedure: CYSTO;  Surgeon: Justen Norman MD;  [facility];  Service: Gynecology    AK LAPS W/VAG HYSTERECT 250 GM/&RMVL TUBE&/OVARIES Bilateral 2024    Procedure: HYSTERECTOMY LAPAROSCOPIC ASSISTED VAGINAL (LAVH) WITH BILATERAL SALPINGECTOMY, EXAM UNDER ANESTHESIA;  Surgeon: Mckenna Barcenas MD;  [facility];  Service: Gynecology    AK SLING OPERATION STRESS INCONTINENCE N/A 2025    Procedure: INSERTION PV SLING (FEMALE)  ( ALTIS)   & EUA, injection of pelvic floor injectable botox;  Surgeon: Justen Norman MD;  [facility];  Service: Gynecology    AK SURGICAL ARTHROSCOPY SHOULDER W/LSS&RESCJ ADS Right 2024    Procedure: ARTHROSCOPY  "SHOULDER, Lysis of Adhesions, subacromial decompression, Manipulation Under Anesthesia;  Surgeon: Bret Azul MD;  [facility];  Service: Orthopedics    TUBAL LIGATION Bilateral 2012       OBJECTIVE:    Vitals:  Blood pressure 102/68, height 5' 2\" (1.575 m), weight 58.8 kg (129 lb 9.6 oz), last menstrual period 12/01/2024.Body mass index is 23.7 kg/m².    Physical Exam:    Physical Exam  Constitutional:       Appearance: Normal appearance. She is normal weight.   Genitourinary:      Genitourinary Comments: Vaginal incision healed well. No erythema, tenderness or abnormal drainage.   Cardiovascular:      Rate and Rhythm: Normal rate.   Pulmonary:      Effort: Pulmonary effort is normal.   Neurological:      Mental Status: She is alert and oriented to person, place, and time.   Skin:     General: Skin is warm and dry.   Psychiatric:         Mood and Affect: Mood normal.         Behavior: Behavior normal.         Thought Content: Thought content normal.         Judgment: Judgment normal.           Justen Norman MD  4/4/2025  8:31 AM       "

## 2025-04-07 ENCOUNTER — TELEPHONE (OUTPATIENT)
Age: 40
End: 2025-04-07

## 2025-04-07 NOTE — TELEPHONE ENCOUNTER
Received a phone call from patient.  Patient stated that she was in the ED on 3/17 d/t anemia and near syncope.  Patient's HGB=10.8.  Patient requesting a f/u with Dr. Tellez.  Scheduled patient with Dr. Tellez on 4/23.  Patient also inquiring if any additional labs should be ordered, as they did not do an iron panel.  Please order any needed labs.

## 2025-04-08 DIAGNOSIS — E61.1 IRON DEFICIENCY: Primary | ICD-10-CM

## 2025-04-09 ENCOUNTER — APPOINTMENT (OUTPATIENT)
Dept: LAB | Facility: HOSPITAL | Age: 40
End: 2025-04-09
Attending: INTERNAL MEDICINE
Payer: COMMERCIAL

## 2025-04-09 DIAGNOSIS — E61.1 IRON DEFICIENCY: ICD-10-CM

## 2025-04-09 LAB
BASOPHILS # BLD AUTO: 0.09 THOUSANDS/ÂΜL (ref 0–0.1)
BASOPHILS NFR BLD AUTO: 2 % (ref 0–1)
EOSINOPHIL # BLD AUTO: 0.13 THOUSAND/ÂΜL (ref 0–0.61)
EOSINOPHIL NFR BLD AUTO: 2 % (ref 0–6)
ERYTHROCYTE [DISTWIDTH] IN BLOOD BY AUTOMATED COUNT: 16.7 % (ref 11.6–15.1)
FERRITIN SERPL-MCNC: 11 NG/ML (ref 30–307)
HCT VFR BLD AUTO: 39.1 % (ref 34.8–46.1)
HGB BLD-MCNC: 12.4 G/DL (ref 11.5–15.4)
IMM GRANULOCYTES # BLD AUTO: 0.01 THOUSAND/UL (ref 0–0.2)
IMM GRANULOCYTES NFR BLD AUTO: 0 % (ref 0–2)
IRON SATN MFR SERPL: 15 % (ref 15–50)
IRON SERPL-MCNC: 54 UG/DL (ref 50–212)
LYMPHOCYTES # BLD AUTO: 1.9 THOUSANDS/ÂΜL (ref 0.6–4.47)
LYMPHOCYTES NFR BLD AUTO: 33 % (ref 14–44)
MCH RBC QN AUTO: 24.7 PG (ref 26.8–34.3)
MCHC RBC AUTO-ENTMCNC: 31.7 G/DL (ref 31.4–37.4)
MCV RBC AUTO: 78 FL (ref 82–98)
MONOCYTES # BLD AUTO: 0.43 THOUSAND/ÂΜL (ref 0.17–1.22)
MONOCYTES NFR BLD AUTO: 7 % (ref 4–12)
NEUTROPHILS # BLD AUTO: 3.29 THOUSANDS/ÂΜL (ref 1.85–7.62)
NEUTS SEG NFR BLD AUTO: 56 % (ref 43–75)
NRBC BLD AUTO-RTO: 0 /100 WBCS
PLATELET # BLD AUTO: 303 THOUSANDS/UL (ref 149–390)
PMV BLD AUTO: 12 FL (ref 8.9–12.7)
RBC # BLD AUTO: 5.03 MILLION/UL (ref 3.81–5.12)
TIBC SERPL-MCNC: 371 UG/DL (ref 250–450)
TRANSFERRIN SERPL-MCNC: 265 MG/DL (ref 203–362)
UIBC SERPL-MCNC: 317 UG/DL (ref 155–355)
WBC # BLD AUTO: 5.85 THOUSAND/UL (ref 4.31–10.16)

## 2025-04-09 PROCEDURE — 82728 ASSAY OF FERRITIN: CPT

## 2025-04-09 PROCEDURE — 83540 ASSAY OF IRON: CPT

## 2025-04-09 PROCEDURE — 36415 COLL VENOUS BLD VENIPUNCTURE: CPT

## 2025-04-09 PROCEDURE — 83550 IRON BINDING TEST: CPT

## 2025-04-09 PROCEDURE — 85025 COMPLETE CBC W/AUTO DIFF WBC: CPT

## 2025-04-22 DIAGNOSIS — F90.9 ADHD: ICD-10-CM

## 2025-04-23 ENCOUNTER — OFFICE VISIT (OUTPATIENT)
Dept: HEMATOLOGY ONCOLOGY | Facility: MEDICAL CENTER | Age: 40
End: 2025-04-23
Payer: COMMERCIAL

## 2025-04-23 VITALS
HEIGHT: 62 IN | HEART RATE: 87 BPM | TEMPERATURE: 97.6 F | SYSTOLIC BLOOD PRESSURE: 90 MMHG | WEIGHT: 127 LBS | OXYGEN SATURATION: 100 % | BODY MASS INDEX: 23.37 KG/M2 | RESPIRATION RATE: 16 BRPM | DIASTOLIC BLOOD PRESSURE: 58 MMHG

## 2025-04-23 DIAGNOSIS — E61.1 IRON DEFICIENCY: Primary | ICD-10-CM

## 2025-04-23 PROCEDURE — 99213 OFFICE O/P EST LOW 20 MIN: CPT | Performed by: INTERNAL MEDICINE

## 2025-04-23 RX ORDER — LISDEXAMFETAMINE DIMESYLATE 20 MG/1
20 CAPSULE ORAL EVERY MORNING
Qty: 30 CAPSULE | Refills: 0 | Status: SHIPPED | OUTPATIENT
Start: 2025-04-23

## 2025-04-23 NOTE — PROGRESS NOTES
Angela Martin  1985  [facility]  [address] [place] [id number]    DISCUSSION/SUMMARY:    39-year-old female with history of iron deficiency anemia, alpha thalassemia trait.  Issues:    History of iron deficiency anemia.  The most recent CBC and iron studies are okay/acceptable.  Patient states having a relatively good diet.  Patient is to continue with a iron supplement as she tolerates.  Patient can try blood builder.  IV iron not needed at this time.  We we discussed what to monitor for as far as progressive fatigue, pallor, chewing ice etc.    Alpha thalassemia trait.  This obviously adds to the anemia.  Patient should take a multivitamin with folate.    Bicornate uterus, heavy menstrual periods.  Patient has undergone hysterectomy.  No additional GYN bleeding.    PCP.  Patient has other generalized complaints, history of headaches, fatigue, concerned about menopause.  Mrs. Martin does not have a PCP.  We discussed this in the past.  Patient understands that she needs to find a PCP if she wishes to address these issues.    Patient is to return to this office in 12 months with repeat blood work before.  Mrs. Martni knows to call the hematology/oncology office if there are any other questions or concerns.    Carefully review your medication list and verify that the list is accurate and up-to-date. Please call the hematology/oncology office if there are medications missing from the list, medications on the list that you are not currently taking or if there is a dosage or instruction that is different from how you're taking that medication.    Patient goals and areas of care: Monitor CBC and iron studies  Barriers to care: None  Patient is able to self-care  _____________________________________________________________________________________    Chief Complaint   Patient presents with    Follow-up    History of iron deficiency anemia      History of Present Illness: 39-year-old female with a history of iron deficiency anemia, history of heavy menstrual periods previously moving from [place] (where she had received routine IV iron).  Patient also with an alpha thalassemia trait.  Patient returns for follow-up.    Mrs. Astorga had been getting iron infusions approximately every 6 months in [place].  Patient states feeling +/-.  Patient has undergone hysterectomy, no GYN bleeding.  No excessive bruising or bleeding elsewhere.  Activities are baseline.  No shortness of breath or dyspnea on exertion, no chewing ice sign restless legs.  Appetite is good, weight is stable.  Activities are baseline.    Review of Systems   Constitutional:  Positive for fatigue.   HENT: Negative.     Eyes: Negative.    Respiratory: Negative.     Cardiovascular: Negative.    Gastrointestinal: Negative.    Endocrine: Negative.    Genitourinary:  Negative for menstrual problem.   Musculoskeletal: Negative.    Skin: Negative.    Allergic/Immunologic: Negative.    Neurological: Negative.    Hematological: Negative.    Psychiatric/Behavioral: Negative.     All other systems reviewed and are negative.    Patient Active Problem List   Diagnosis    Alpha thalassemia trait    Bicornuate uterus    Intractable migraine without aura and without status migrainosus    Iron deficiency    Adhesive capsulitis of right shoulder    S/P tubal ligation    Dense breasts    Abnormal uterine bleeding (AUB)    LUIS ALFREDO (stress urinary incontinence, female)    Female stress incontinence    Myofascial pain dysfunction syndrome     Past Medical History:   Diagnosis Date    ADHD (attention deficit hyperactivity disorder)     Anemia     KVNG (generalized anxiety disorder)     History of transfusion 10/03/2008    Hemorrhage during child birth    Memory loss     Migraines     Miscarriage     , , ,      Past Surgical History:   Procedure Laterality Date     SECTION  12    DILATION AND  CURETTAGE OF UTERUS      2005    KIDNEY SURGERY Right 2003    Hydronephorsis while pregnant (pardon the spelling)    AL CYSTOURETHROSCOPY N/A 12/11/2024    Procedure: CYSTOSCOPY;  Surgeon: Mckenna Barcenas MD;  [facility];  Service: Gynecology    AL CYSTOURETHROSCOPY N/A 2/20/2025    Procedure: CYSTO;  Surgeon: Justen Norman MD;  [facility];  Service: Gynecology    AL LAPS W/VAG HYSTERECT 250 GM/&RMVL TUBE&/OVARIES Bilateral 12/11/2024    Procedure: HYSTERECTOMY LAPAROSCOPIC ASSISTED VAGINAL (LAVH) WITH BILATERAL SALPINGECTOMY, EXAM UNDER ANESTHESIA;  Surgeon: Mckenna Barcenas MD;  [facility];  Service: Gynecology    AL SLING OPERATION STRESS INCONTINENCE N/A 2/20/2025    Procedure: INSERTION PV SLING (FEMALE)  ( ALTIS)   & EUA, injection of pelvic floor injectable botox;  Surgeon: Justen Norman MD;  [facility];  Service: Gynecology    AL SURGICAL ARTHROSCOPY SHOULDER W/LSS&RESCJ ADS Right 11/14/2024    Procedure: ARTHROSCOPY SHOULDER, Lysis of Adhesions, subacromial decompression, Manipulation Under Anesthesia;  Surgeon: Bret Azul MD;  [facility];  Service: Orthopedics    TUBAL LIGATION Bilateral 2012     Family History   Problem Relation Age of Onset    COPD Mother     Endometriosis Mother     Diabetes Mother     Learning disabilities Mother     Neurological problems Mother     Asthma Mother     Heart disease Mother     Seizures Mother     No Known Problems Father     Learning disabilities Sister     Neurological problems Sister     No Known Problems Sister     No Known Problems Sister     No Known Problems Daughter      Social History     Socioeconomic History    Marital status: /Civil Union     Spouse name: Not on file    Number of children: Not on file    Years of education: Not on file    Highest education level: Not on file   Occupational History    Not on file   Tobacco Use    Smoking status: Never     Passive exposure: Never    Smokeless  tobacco: Never   Vaping Use    Vaping status: Never Used   Substance and Sexual Activity    Alcohol use: Not Currently     Alcohol/week: 1.0 standard drink of alcohol     Types: 1 Standard drinks or equivalent per week     Comment: rare    Drug use: Never    Sexual activity: Not Currently     Partners: Male     Birth control/protection: Female Sterilization     Comment: 12 years    Other Topics Concern    Not on file   Social History Narrative    Not on file     Social Drivers of Health     Financial Resource Strain: Low Risk  (4/4/2025)    Overall Financial Resource Strain (CARDIA)     Difficulty of Paying Living Expenses: Not hard at all   Food Insecurity: No Food Insecurity (4/4/2025)    Hunger Vital Sign     Worried About Running Out of Food in the Last Year: Never true     Ran Out of Food in the Last Year: Never true   Transportation Needs: No Transportation Needs (4/4/2025)    PRAPARE - Transportation     Lack of Transportation (Medical): No     Lack of Transportation (Non-Medical): No   Physical Activity: Not on file   Stress: Not on file   Social Connections: Not on file   Intimate Partner Violence: Not At Risk (8/17/2023)    Received from [employer]     Threatened: Not on file     Insulted: Not on file     Physically Hurt : Not on file     Scream: Not on file   Housing Stability: Low Risk  (4/4/2025)    Housing Stability Vital Sign     Unable to Pay for Housing in the Last Year: No     Number of Times Moved in the Last Year: 0     Homeless in the Last Year: No       Current Outpatient Medications:     lisdexamfetamine (VYVANSE) 20 MG capsule, Take 1 capsule (20 mg total) by mouth every morning Max Daily Amount: 20 mg, Disp: 30 capsule, Rfl: 0    aspirin-acetaminophen-caffeine (EXCEDRIN MIGRAINE) 250-250-65 MG per tablet, Take 1 tablet by mouth every 6 (six) hours as needed for headaches (Patient not taking: Reported on 4/4/2025), Disp: , Rfl:     cyclobenzaprine  (FLEXERIL) 10 mg tablet, Take 1 tablet (10 mg total) by mouth 3 (three) times a day as needed for muscle spasms (Patient not taking: Reported on 4/4/2025), Disp: 10 tablet, Rfl: 0    Allergies   Allergen Reactions    Tetanus-Diphtheria Toxoids Td Swelling       Vitals:    04/23/25 0815   BP: 90/58   Pulse: 87   Resp: 16   Temp: 97.6 °F (36.4 °C)   SpO2: 100%     Physical Exam  Constitutional:       Appearance: She is well-developed.   HENT:      Head: Normocephalic and atraumatic.      Right Ear: External ear normal.      Left Ear: External ear normal.   Eyes:      Conjunctiva/sclera: Conjunctivae normal.      Pupils: Pupils are equal, round, and reactive to light.   Cardiovascular:      Rate and Rhythm: Normal rate and regular rhythm.      Heart sounds: Normal heart sounds.   Pulmonary:      Effort: Pulmonary effort is normal.      Breath sounds: Normal breath sounds.   Abdominal:      General: Bowel sounds are normal.      Palpations: Abdomen is soft.   Musculoskeletal:         General: Normal range of motion.      Cervical back: Normal range of motion and neck supple.   Skin:     General: Skin is warm.      Comments: Relatively good color, warm, moist, no petechiae or ecchymoses   Neurological:      Mental Status: She is alert and oriented to person, place, and time.      Deep Tendon Reflexes: Reflexes are normal and symmetric.   Psychiatric:         Behavior: Behavior normal.         Thought Content: Thought content normal.         Judgment: Judgment normal.     Extremities: No lower extremity edema bilaterally    Labs    4/9/2025 ferritin = 11 iron saturation = 15% TIBC = 371 iron = 54        10/26/2023 TSH = 2.343

## 2025-04-29 ENCOUNTER — OFFICE VISIT (OUTPATIENT)
Dept: OBGYN CLINIC | Facility: CLINIC | Age: 40
End: 2025-04-29
Payer: COMMERCIAL

## 2025-04-29 VITALS — WEIGHT: 127 LBS | HEIGHT: 62 IN | BODY MASS INDEX: 23.37 KG/M2

## 2025-04-29 DIAGNOSIS — M75.51 SUBACROMIAL BURSITIS OF RIGHT SHOULDER JOINT: Primary | ICD-10-CM

## 2025-04-29 PROCEDURE — 99213 OFFICE O/P EST LOW 20 MIN: CPT | Performed by: ORTHOPAEDIC SURGERY

## 2025-04-29 PROCEDURE — 20610 DRAIN/INJ JOINT/BURSA W/O US: CPT | Performed by: ORTHOPAEDIC SURGERY

## 2025-04-29 RX ORDER — TRIAMCINOLONE ACETONIDE 40 MG/ML
40 INJECTION, SUSPENSION INTRA-ARTICULAR; INTRAMUSCULAR
Status: COMPLETED | OUTPATIENT
Start: 2025-04-29 | End: 2025-04-29

## 2025-04-29 RX ORDER — ROPIVACAINE HYDROCHLORIDE 2 MG/ML
4 INJECTION, SOLUTION EPIDURAL; INFILTRATION; PERINEURAL
Status: COMPLETED | OUTPATIENT
Start: 2025-04-29 | End: 2025-04-29

## 2025-04-29 RX ADMIN — ROPIVACAINE HYDROCHLORIDE 4 ML: 2 INJECTION, SOLUTION EPIDURAL; INFILTRATION; PERINEURAL at 08:45

## 2025-04-29 RX ADMIN — TRIAMCINOLONE ACETONIDE 40 MG: 40 INJECTION, SUSPENSION INTRA-ARTICULAR; INTRAMUSCULAR at 08:45

## 2025-04-29 NOTE — PROGRESS NOTES
Patient Name: Angela Martin      : 1985       MRN: [id number]   Encounter Provider: Bret Azul MD   Encounter Date: 25  Encounter department: [facility]         Assessment & Plan  Subacromial bursitis of right shoulder joint    Orders:  •  Large joint arthrocentesis: R subacromial bursa  •  ropivacaine (NAROPIN) injection 4 mL  •  triamcinolone acetonide (Kenalog-40) 40 mg/mL injection 40 mg       Plan:  Angela is a 39-year-old female returning to see us for right shoulder pain.  She is 5 and half months out from a right shoulder arthroscopy with lysis of adhesions, subacromial decompression and manipulation under anesthesia.  She has had a worsening of pain of late and is unsure if she slept on the shoulder awkwardly.  Today she maintains good motion of the shoulder and I do not feel that it is refrozen.  She does have impingement on examination and it is possible that she has a return of subacromial bursitis.  We discussed treatment options.  I would like her to continue with her home exercise program to maintain strength of the rotator cuff.  I did offer a course of oral steroids today or a subacromial steroid injection.  She opted for the subacromial steroid injection.  We discussed the risk and benefits and she was amenable to proceed.  She tolerated the procedure well and postinjection instructions were provided.  I asked that she give the medication up to 1 to 2 weeks to take full effect.  I also recommended she apply Voltaren cream to the shoulder.  Should she not find relief over the next 4 to 6 weeks with her home exercise program she can return to see me and may consider an MRI.  It is very unlikely that she has suffered a rotator cuff tear as she has not had a new injury.  All of her questions were answered to her satisfaction.    Follow-up:  No follow-ups on file.     _____________________________________________________  CHIEF  COMPLAINT:  Chief Complaint   Patient presents with   • Right Shoulder - Follow-up         SUBJECTIVE:  Angela Martin is a 39 y.o. female who presents for follow-up evaluation of her right shoulder.  She has a previous history of right shoulder adhesive capsulitis and subsequent right shoulder arthroscopy with lysis of adhesions, subacromial decompression and manipulation under anesthesia performed 5-1/2 months ago.  Angela states her shoulder has always been sore since her surgery but of late she feels she may have slept on it wrong.  She has had an increase of pain and describes a fairly persistent ache at the lateral aspect of the shoulder that worsens with shoulder abduction.  Currently her daughter is participating in softball and she is unable to throw normally due to shoulder pain.  She notes weakness when lifting anything of mild weight. She denies new injury to the shoulder.  She states the pain can radiate into the upper arm at times.  She denies distal paresthesias.  She will take Aleve and Tylenol on occasion but is unsure if that provides relief.    PAST MEDICAL HISTORY:  Past Medical History:   Diagnosis Date   • ADHD (attention deficit hyperactivity disorder)    • Anemia    • KVNG (generalized anxiety disorder)    • History of transfusion 10/03/2008    Hemorrhage during child birth   • Memory loss    • Migraines    • Miscarriage     , , ,        PAST SURGICAL HISTORY:  Past Surgical History:   Procedure Laterality Date   •  SECTION  12   • DILATION AND CURETTAGE OF UTERUS         • KIDNEY SURGERY Right     Hydronephorsis while pregnant (pardon the spelling)   • PA CYSTOURETHROSCOPY N/A 2024    Procedure: CYSTOSCOPY;  Surgeon: Mckenna Barcenas MD;  [facility];  Service: Gynecology   • PA CYSTOURETHROSCOPY N/A 2025    Procedure: CYSTO;  Surgeon: Justen Norman MD;  [facility];  Service: Gynecology   • PA LAPS W/VAG HYSTERECT 250 GM/&RMVL  TUBE&/OVARIES Bilateral 12/11/2024    Procedure: HYSTERECTOMY LAPAROSCOPIC ASSISTED VAGINAL (LAVH) WITH BILATERAL SALPINGECTOMY, EXAM UNDER ANESTHESIA;  Surgeon: Mckenna Barcenas MD;  [facility];  Service: Gynecology   • [place] SLING OPERATION STRESS INCONTINENCE N/A 2/20/2025    Procedure: INSERTION PV SLING (FEMALE)  ( ALTIS)   & EUA, injection of pelvic floor injectable botox;  Surgeon: Justen Norman MD;  [facility];  Service: Gynecology   • W[place] SURGICAL ARTHROSCOPY SHOULDER W/LSS&RESCJ ADS Right 11/14/2024    Procedure: ARTHROSCOPY SHOULDER, Lysis of Adhesions, subacromial decompression, Manipulation Under Anesthesia;  Surgeon: Bret Azul MD;  [facility];  Service: Orthopedics   • TUBAL LIGATION Bilateral 2012       FAMILY HISTORY:  Family History   Problem Relation Age of Onset   • COPD Mother    • Endometriosis Mother    • Diabetes Mother    • Learning disabilities Mother    • Neurological problems Mother    • Asthma Mother    • Heart disease Mother    • Seizures Mother    • No Known Problems Father    • Learning disabilities Sister    • Neurological problems Sister    • No Known Problems Sister    • No Known Problems Sister    • No Known Problems Daughter        SOCIAL HISTORY:  Social History     Tobacco Use   • Smoking status: Never     Passive exposure: Never   • Smokeless tobacco: Never   Vaping Use   • Vaping status: Never Used   Substance Use Topics   • Alcohol use: Not Currently     Alcohol/week: 1.0 standard drink of alcohol     Types: 1 Standard drinks or equivalent per week     Comment: rare   • Drug use: Never       MEDICATIONS:    Current Outpatient Medications:   •  lisdexamfetamine (VYVANSE) 20 MG capsule, Take 1 capsule (20 mg total) by mouth every morning Max Daily Amount: 20 mg, Disp: 30 capsule, Rfl: 0  •  aspirin-acetaminophen-caffeine (EXCEDRIN MIGRAINE) 250-250-65 MG per tablet, Take 1 tablet by mouth every 6 (six) hours as needed for headaches (Patient  "not taking: Reported on 3/7/2025), Disp: , Rfl:   •  cyclobenzaprine (FLEXERIL) 10 mg tablet, Take 1 tablet (10 mg total) by mouth 3 (three) times a day as needed for muscle spasms (Patient not taking: Reported on 4/4/2025), Disp: 10 tablet, Rfl: 0  No current facility-administered medications for this visit.    ALLERGIES:  Allergies   Allergen Reactions   • Tetanus-Diphtheria Toxoids Td Swelling       LABS:  HgA1c:   Lab Results   Component Value Date    HGBA1C 4.9 12/05/2024     BMP:   Lab Results   Component Value Date    CALCIUM 9.0 03/17/2025    K 4.0 03/17/2025    CO2 23 03/17/2025     03/17/2025    BUN 17 03/17/2025    CREATININE 0.59 (L) 03/17/2025     CBC: No components found for: \"CBC\"    _____________________________________________________  Review of Systems   Constitutional:  Negative for chills, fever and unexpected weight change.   HENT:  Negative for hearing loss, nosebleeds and sore throat.    Eyes:  Negative for pain, redness and visual disturbance.   Respiratory:  Negative for cough, shortness of breath and wheezing.    Cardiovascular:  Negative for chest pain, palpitations and leg swelling.   Gastrointestinal:  Negative for abdominal pain, nausea and vomiting.   Endocrine: Negative for polydipsia and polyuria.   Genitourinary:  Negative for dysuria and hematuria.   Musculoskeletal:         See HPI   Skin:  Negative for rash and wound.   Neurological:  Negative for dizziness, numbness and headaches.   Psychiatric/Behavioral:  Negative for decreased concentration and suicidal ideas. The patient is not nervous/anxious.         Right Shoulder Exam     Range of Motion   Active abduction:  160   External rotation:  90+   Forward flexion:  170   Internal rotation 0 degrees:  L1   Internal rotation 90 degrees:  60     Muscle Strength   Abduction: 4/5   Internal rotation: 5/5   External rotation: 5/5   Supraspinatus: 4/5   Subscapularis: 5/5   Biceps: 5/5     Tests   Impingement: positive    Other " "  Erythema: absent  Scars: present  Sensation: normal  Pulse: present (2+ radial)    Comments:  Demonstrates normal elbow, wrist, and finger motion  2+  distal radial pulse with brisk capillary refill to the fingers  Radial, median, ulnar and motor  and sensory distribution intact  Sensation to light touch intact distally              Physical Exam  Vitals reviewed.   Constitutional:       Appearance: She is well-developed.   HENT:      Head: Normocephalic and atraumatic.   Eyes:      General:         Right eye: No discharge.         Left eye: No discharge.      Conjunctiva/sclera: Conjunctivae normal.   Cardiovascular:      Rate and Rhythm: Regular rhythm.   Pulmonary:      Effort: Pulmonary effort is normal. No respiratory distress.      Breath sounds: No stridor.   Musculoskeletal:      Cervical back: Normal range of motion and neck supple.      Comments: As noted in the HPI.   Skin:     General: Skin is warm and dry.   Neurological:      Mental Status: She is alert and oriented to person, place, and time.   Psychiatric:         Behavior: Behavior normal.        _____________________________________________________  STUDIES REVIEWED:  I personally reviewed the pertinent images and my independent interpretation is as follows:  No images today.    PROCEDURES PERFORMED:  Large joint arthrocentesis: R subacromial bursa  [place] Protocol:  procedure performed by consultantConsent: Verbal consent obtained.  Risks and benefits: risks, benefits and alternatives were discussed  Consent given by: patient  Time out: Immediately prior to procedure a \"time out\" was called to verify the correct patient, procedure, equipment, support staff and site/side marked as required.  Patient understanding: patient states understanding of the procedure being performed  Patient consent: the patient's understanding of the procedure matches consent given  Radiology Images displayed and confirmed. If images not available, report reviewed: " imaging studies available  Patient identity confirmed: verbally with patient  Supporting Documentation  Indications: pain     Is this a Visco injection? NoProcedure Details  Location: shoulder - R subacromial bursa  Preparation: Patient was prepped and draped in the usual sterile fashion  Needle size: 22 G  Ultrasound guidance: no  Approach: posterior  Medications administered: 40 mg triamcinolone acetonide 40 mg/mL; 4 mL ropivacaine 0.2 %    Patient tolerance: patient tolerated the procedure well with no immediate complications  Dressing:  Sterile dressing applied            Scribe Attestation    I,:  Melchor Yoder am acting as a scribe while in the presence of the attending physician.:       I,:  Bret Azul MD personally performed the services described in this documentation    as scribed in my presence.:

## 2025-07-22 ENCOUNTER — OFFICE VISIT (OUTPATIENT)
Age: 40
End: 2025-07-22
Payer: COMMERCIAL

## 2025-07-22 VITALS
HEIGHT: 62 IN | DIASTOLIC BLOOD PRESSURE: 82 MMHG | BODY MASS INDEX: 23 KG/M2 | WEIGHT: 125 LBS | OXYGEN SATURATION: 99 % | SYSTOLIC BLOOD PRESSURE: 120 MMHG | HEART RATE: 107 BPM

## 2025-07-22 DIAGNOSIS — F90.9 ADHD: ICD-10-CM

## 2025-07-22 DIAGNOSIS — G43.009 MIGRAINE WITHOUT AURA AND WITHOUT STATUS MIGRAINOSUS, NOT INTRACTABLE: ICD-10-CM

## 2025-07-22 DIAGNOSIS — F41.9 ANXIETY AND DEPRESSION: ICD-10-CM

## 2025-07-22 DIAGNOSIS — F32.A ANXIETY AND DEPRESSION: ICD-10-CM

## 2025-07-22 DIAGNOSIS — R41.3 MILD MEMORY DISTURBANCE: Primary | ICD-10-CM

## 2025-07-22 PROCEDURE — 99215 OFFICE O/P EST HI 40 MIN: CPT | Performed by: PSYCHIATRY & NEUROLOGY

## 2025-07-22 RX ORDER — RIZATRIPTAN BENZOATE 10 MG/1
10 TABLET ORAL AS NEEDED
Qty: 18 TABLET | Refills: 0 | Status: SHIPPED | OUTPATIENT
Start: 2025-07-22

## 2025-07-22 RX ORDER — MAGNESIUM OXIDE 400 MG/1
400 TABLET ORAL DAILY
Qty: 30 TABLET | Refills: 5 | Status: SHIPPED | OUTPATIENT
Start: 2025-07-22

## 2025-07-22 NOTE — PROGRESS NOTES
Return NeuroOutpatient Note        Angela Martin  [id number]  39 y.o.  1985       MCI (mild cognitive impairment)  and ADHD        History obtained from:  Patient     HPI/Subjective:    Angela Martin is a 40 yo F with PMH of ADHD presents as f/u. She was last seen by me in Jan 2025. She has reported short term memory impairment. She would write lists but will soon forget what she needs to get. Her children have noted that she tends to forget easily. Patient has h/o ADHD most of her life. She hasn't been on any therapy for past 2 years due to not having regular provider to f/u with. She had been on Adderall for a month only. She had palpitations and couldn't continue. She was supposed to try Vyvanse but that was never sent. We had started her on Vyvanse but she couldn't tolerate it. She had locked jaw after a month of taking it.  Her attention was inconsistently better.     All of her children and siblings have ADD but she was never diagnosed during childhood.      She had MRI brain w/o contrast in 2021 and it was negative.      Her Vit D and B12 were checked. She was borderline low with Vit D and has been  taking supplements.      As for memory, most she will play is sudoku.   Patient was referred to have neuropsych testing. It revealed problem with her depression, anxiety, fatigue, lack of sleep as contributing factors for her low performance level.    She has TMJ but says mouth guard by dentist was too expensive. She ordered one from [employer] which she hasn't tried yet.    She sleeps 5-6 hrs only.    Her current main stress at home is her sister in law living with her who has physical and cognitive disabilities.      She's had EEG in past in [place] and it was normal.   She has h/o migraines but is going to have hysterectomy and they may subside after that.   She had side effects to imitrex injection.      Patient denies any h/o early onset Alzheimer's.     Today patient also reports frequent headaches. She can  wake up with them. They can start in right temporal region and spread. She gets tense all over. One can persist for days. She does report associated light, noise sensitivity. She may get occasional nausea, vomiting. On average, in a month, she's with a headache for 15 days.             Past Medical History[1]  Social History     Socioeconomic History   • Marital status: /Civil Union     Spouse name: Not on file   • Number of children: Not on file   • Years of education: Not on file   • Highest education level: Not on file   Occupational History   • Not on file   Tobacco Use   • Smoking status: Never     Passive exposure: Never   • Smokeless tobacco: Never   Vaping Use   • Vaping status: Never Used   Substance and Sexual Activity   • Alcohol use: Not Currently     Alcohol/week: 1.0 standard drink of alcohol     Types: 1 Standard drinks or equivalent per week     Comment: rare   • Drug use: Never   • Sexual activity: Not Currently     Partners: Male     Birth control/protection: Female Sterilization     Comment: 12 years    Other Topics Concern   • Not on file   Social History Narrative   • Not on file     Social Drivers of Health     Financial Resource Strain: Low Risk  (4/4/2025)    Overall Financial Resource Strain (CARDIA)    • Difficulty of Paying Living Expenses: Not hard at all   Food Insecurity: No Food Insecurity (4/4/2025)    Nursing - Inadequate Food Risk Classification    • Worried About Running Out of Food in the Last Year: Never true    • Ran Out of Food in the Last Year: Never true    • Ran Out of Food in the Last Year: Not on file   Transportation Needs: No Transportation Needs (4/4/2025)    PRAPARE - Transportation    • Lack of Transportation (Medical): No    • Lack of Transportation (Non-Medical): No   Physical Activity: Not on file   Stress: Not on file   Social Connections: Not on file   Intimate Partner Violence: Not At Risk (8/17/2023)    Received from [employer]    [facility]     "• Threatened: Not on file    • Insulted: Not on file    • Physically Hurt : Not on file    • Scream: Not on file   Housing Stability: Low Risk  (4/4/2025)    Housing Stability Vital Sign    • Unable to Pay for Housing in the Last Year: No    • Number of Times Moved in the Last Year: 0    • Homeless in the Last Year: No     Family History[1]  Allergies[1]  Medications Ordered Prior to Encounter[1]      Review of Systems   Refer to positive review of systems in HPI.   Review of Systems    Constitutional- No fever  Eyes- No visual change  ENT- Hearing normal  CV- No chest pain  Resp- No Shortness of breath  GI- No diarrhea  - Bladder normal  MS- No Arthritis   Skin- No rash  Psych- No depression  Endo- No DM  Heme- No nodes    Vitals:    07/22/25 1258   BP: 120/82   BP Location: Left arm   Patient Position: Sitting   Cuff Size: Standard   Pulse: (!) 107   SpO2: 99%   Weight: 56.7 kg (125 lb)   Height: 5' 2\" (1.575 m)       PHYSICAL EXAM:  Appearance: No Acute Distress  Ophthalmoscopic: Disc Flat, Normal fundus  Mental status:  Orientation: Awake, Alert, and Orientedx3  Memory: previously, MOCA: 25/30.   Attention: normal  Knowledge: good  Language: No aphasia  Speech: No dysarthria  Cranial Nerves:  2 No Visual Defect on Confrontation, Pupils round, equal, reactive to light  3,4,6 Extraocular Movements Intact, no nystagmus  5 Facial Sensation Intact  7 No facial asymmetry  8 Intact hearing  9,10 Palate symmetric, normal gag  11 Good shoulder shrug  12 Tongue Midline  Gait: Stable  Coordination: No ataxia with finger to nose testing, and heel to shin  Sensory: Intact, Symmetric to pinprick, light touch, vibration, and joint position  Muscle Tone: Normal              Muscle exam:  Arm Right Left Leg Right Left   Deltoid 5/5 5/5 Iliopsoas 5/5 5/5   Biceps 5/5 5/5 Quads 5/5 5/5   Triceps 5/5 5/5 Hamstrings 5/5 5/5   Wrist Extension 5/5 5/5 Ankle Dorsi Flexion 5/5 5/5   Wrist Flexion 5/5 5/5 Ankle Plantar Flexion 5/5 5/5 "   Interossei 5/5 5/5 Ankle Eversion 5/5 5/5   APB 5/5 5/5 Ankle Inversion 5/5 5/5       Reflexes   RJ BJ TJ KJ AJ Plantars Mcdonough's   Right 2+ 2+ 2+ 2+ 2+ Downgoing Not present   Left 2+ 2+ 2+ 2+ 2+ Downgoing Not present     Personal review of  Labs:                 Diagnoses and all orders for this visit:    Mild memory disturbance    Migraine without aura and without status migrainosus, not intractable  -     amitriptyline (ELAVIL) 25 mg tablet; Start with 1 tab at bedtime for 2 weeks and then take 2 tabs at bedtime.  -     magnesium oxide (MAG-OX) 400 mg tablet; Take 1 tablet (400 mg total) by mouth daily  -     rizatriptan (Maxalt) 10 mg tablet; Take 1 tablet (10 mg total) by mouth as needed for migraine Take at the onset of migraine; if symptoms continue or return, may take another dose at least 2 hours after first dose. Take no more than 2 doses in a day.    Anxiety and depression    ADHD        Assessment & Plan  Mild memory disturbance  Discussed that her memory problem is stemming from various other ailments that she needs to fix, primarily depression, anxiety, improving sleep.        Migraine without aura and without status migrainosus, not intractable    Orders:  •  amitriptyline (ELAVIL) 25 mg tablet; Start with 1 tab at bedtime for 2 weeks and then take 2 tabs at bedtime.  •  magnesium oxide (MAG-OX) 400 mg tablet; Take 1 tablet (400 mg total) by mouth daily  •  rizatriptan (Maxalt) 10 mg tablet; Take 1 tablet (10 mg total) by mouth as needed for migraine Take at the onset of migraine; if symptoms continue or return, may take another dose at least 2 hours after first dose. Take no more than 2 doses in a day.  starting elavil for prevention.   She has tendency for low BP so will avoid any antihypertensives.     Anxiety and depression  Elavil should help with this as well.       ADHD  Will defer management as she hasn't tolerated. She does have ADD however. For now, asked her to try to focus better,  prioritize one task at a time.                          Total time of encounter:  40 min  More than 50% of the time was used in counseling and/or coordination of care  Extent of counseling and/or coordination of care        Rigo Wilson MD  [facility]  [address], [address]  [place] [address]  [phone]           [1]  Past Medical History:  Diagnosis Date   • ADHD (attention deficit hyperactivity disorder)    • Anemia    • KVNG (generalized anxiety disorder)    • History of transfusion 10/03/2008    Hemorrhage during child birth   • Memory loss    • Migraines    • Miscarriage     2007, 2008, 2010, 2011   [1]  Family History  Problem Relation Name Age of Onset   • COPD Mother Elen Bridge    • Endometriosis Mother Elen Bridge    • Diabetes Mother Elen Bridge    • Learning disabilities Mother Elen Bridge    • Neurological problems Mother Elen Bridge    • Asthma Mother Elen Bridge    • Heart disease Mother Elen Bridge    • Seizures Mother Elen Bridge    • No Known Problems Father     • Learning disabilities Sister Della Waterman    • Neurological problems Sister Della Waterman    • No Known Problems Sister yuridia    • No Known Problems Sister hank    • No Known Problems Daughter     [1]  Allergies  Allergen Reactions   • Tetanus-Diphtheria Toxoids Td Swelling   [1]  Current Outpatient Medications on File Prior to Visit   Medication Sig Dispense Refill   • aspirin-acetaminophen-caffeine (EXCEDRIN MIGRAINE) 250-250-65 MG per tablet Take 1 tablet by mouth every 6 (six) hours as needed for headaches (Patient not taking: Reported on 7/22/2025)     • lisdexamfetamine (VYVANSE) 20 MG capsule Take 1 capsule (20 mg total) by mouth every morning Max Daily Amount: 20 mg (Patient not taking: Reported on 7/22/2025) 30 capsule 0   • [DISCONTINUED] cyclobenzaprine (FLEXERIL) 10 mg tablet Take 1 tablet (10 mg total) by mouth 3 (three) times a  day as needed for muscle spasms 10 tablet 0     No current facility-administered medications on file prior to visit.    details…

## 2025-08-04 ENCOUNTER — PATIENT MESSAGE (OUTPATIENT)
Dept: FAMILY MEDICINE CLINIC | Facility: CLINIC | Age: 40
End: 2025-08-04

## 2025-08-04 DIAGNOSIS — Z13.0 SCREENING FOR DEFICIENCY ANEMIA: ICD-10-CM

## 2025-08-04 DIAGNOSIS — Z13.29 SCREENING FOR THYROID DISORDER: Primary | ICD-10-CM

## 2025-08-04 DIAGNOSIS — Z13.1 SCREENING FOR DIABETES MELLITUS: ICD-10-CM

## 2025-08-07 ENCOUNTER — APPOINTMENT (OUTPATIENT)
Dept: LAB | Facility: HOSPITAL | Age: 40
End: 2025-08-07
Attending: NURSE PRACTITIONER
Payer: COMMERCIAL

## 2025-08-07 ENCOUNTER — OFFICE VISIT (OUTPATIENT)
Dept: FAMILY MEDICINE CLINIC | Facility: CLINIC | Age: 40
End: 2025-08-07
Payer: COMMERCIAL

## 2025-08-07 VITALS
DIASTOLIC BLOOD PRESSURE: 64 MMHG | RESPIRATION RATE: 18 BRPM | HEIGHT: 62 IN | BODY MASS INDEX: 23.55 KG/M2 | HEART RATE: 68 BPM | SYSTOLIC BLOOD PRESSURE: 102 MMHG | WEIGHT: 128 LBS | TEMPERATURE: 97.8 F

## 2025-08-07 DIAGNOSIS — K59.00 CONSTIPATION, UNSPECIFIED CONSTIPATION TYPE: Primary | ICD-10-CM

## 2025-08-07 PROCEDURE — 99213 OFFICE O/P EST LOW 20 MIN: CPT | Performed by: NURSE PRACTITIONER

## (undated) DEVICE — SUT VICRYL 0 CT-1 CR/8 27 IN JJ41G

## (undated) DEVICE — ASTOUND STANDARD SURGICAL GOWN, XL: Brand: CONVERTORS

## (undated) DEVICE — 1840 FOAM BLOCK NEEDLE COUNTER: Brand: DEVON

## (undated) DEVICE — ANTIBACTERIAL UNDYED BRAIDED (POLYGLACTIN 910), SYNTHETIC ABSORBABLE SUTURE: Brand: COATED VICRYL

## (undated) DEVICE — STERILE DOUBLE BASIN SET PACK: Brand: CARDINAL HEALTH

## (undated) DEVICE — TROCAR: Brand: KII® SLEEVE

## (undated) DEVICE — POSITIONER HEAD DISP STRL

## (undated) DEVICE — GLOVE SRG BIOGEL 8

## (undated) DEVICE — DUAL SPIKE ADAPTER: Brand: CONMED

## (undated) DEVICE — TOWEL SURG XR DETECT GREEN STRL RFD

## (undated) DEVICE — LAVH/LAPAROSCOPY DRAPE: Brand: CONVERTORS

## (undated) DEVICE — CATH URET 12FR RED RUBBER

## (undated) DEVICE — POSITIONER TRIMANO LIMB BEACH CHAIR

## (undated) DEVICE — SCD SEQUENTIAL COMPRESSION COMFORT SLEEVE MEDIUM KNEE LENGTH: Brand: KENDALL SCD

## (undated) DEVICE — 3M™ TEGADERM™ TRANSPARENT FILM DRESSING FRAME STYLE, 1626W, 4 IN X 4-3/4 IN (10 CM X 12 CM), 50/CT 4CT/CASE: Brand: 3M™ TEGADERM™

## (undated) DEVICE — LUBRICANT JELLY SURGILUBE TUBE 4OZ FLIP TOP

## (undated) DEVICE — [HIGH FLOW INSUFFLATOR,  DO NOT USE IF PACKAGE IS DAMAGED,  KEEP DRY,  KEEP AWAY FROM SUNLIGHT,  PROTECT FROM HEAT AND RADIOACTIVE SOURCES.]: Brand: PNEUMOSURE

## (undated) DEVICE — LIGHT GLOVE GREEN

## (undated) DEVICE — EXOFIN PRECISION PEN HIGH VISCOSITY TOPICAL SKIN ADHESIVE: Brand: EXOFIN PRECISION PEN, 1G

## (undated) DEVICE — DISPOSABLE BRIEF/UNDERWEAR

## (undated) DEVICE — TUBING ARTHROSCOPIC WAVE  MAIN PUMP

## (undated) DEVICE — PAD SANITARY

## (undated) DEVICE — INTENDED FOR TISSUE SEPARATION, AND OTHER PROCEDURES THAT REQUIRE A SHARP SURGICAL BLADE TO PUNCTURE OR CUT.: Brand: BARD-PARKER SAFETY BLADES SIZE 11, STERILE

## (undated) DEVICE — CATH FOLEY 18FR 5ML 2 WAY UNCOATED SILICONE

## (undated) DEVICE — PROBE ABLATION  APOLLO RF ASPIRING 90 DEG

## (undated) DEVICE — DRAPE EQUIPMENT RF WAND

## (undated) DEVICE — PREMIUM DRY TRAY LF: Brand: MEDLINE INDUSTRIES, INC.

## (undated) DEVICE — SUT VICRYL 4-0 PS-2 27 IN J426H

## (undated) DEVICE — HEMOSTAT POWDER ADSORB SURGICEL 3GM

## (undated) DEVICE — GLOVE PI ULTRA TOUCH SZ.7.0

## (undated) DEVICE — U-DRAPE: Brand: CONVERTORS

## (undated) DEVICE — GLOVE INDICATOR PI UNDERGLOVE SZ 6.5 BLUE

## (undated) DEVICE — ENSEAL X1 TISSUE SEALER, CURVED JAW, 37 CM SHAFT LENGTH: Brand: ENSEAL

## (undated) DEVICE — EXIDINE 4 PCT

## (undated) DEVICE — APPLICATOR ENDO SURGICEL

## (undated) DEVICE — NEEDLE HYPO 23G X 1-1/2 IN

## (undated) DEVICE — UTERINE MANIPULATOR RUMI 6.7 X 8 CM

## (undated) DEVICE — ALLENTOWN DR  LUCENTE S LAP PK: Brand: CARDINAL HEALTH

## (undated) DEVICE — PERI/GYN PACK: Brand: CONVERTORS

## (undated) DEVICE — INTENDED FOR TISSUE SEPARATION, AND OTHER PROCEDURES THAT REQUIRE A SHARP SURGICAL BLADE TO PUNCTURE OR CUT.: Brand: BARD-PARKER ® CARBON RIB-BACK BLADES

## (undated) DEVICE — GLOVE INDICATOR PI UNDERGLOVE SZ 8 BLUE

## (undated) DEVICE — TROCAR: Brand: KII FIOS FIRST ENTRY

## (undated) DEVICE — GLOVE INDICATOR PI UNDERGLOVE SZ 7 BLUE

## (undated) DEVICE — PACK GENERAL LF

## (undated) DEVICE — TRAY FOLEY 16FR URIMETER SILICONE SURESTEP

## (undated) DEVICE — CHLORAPREP HI-LITE 26ML ORANGE

## (undated) DEVICE — TIBURON BEACH CHAIR SHOULDER DRAPE: Brand: CONVERTORS

## (undated) DEVICE — PROBE ABLATION  APOLLORF 90 DEG MULTI PORT

## (undated) DEVICE — CHLORHEXIDINE 4PCT 4 OZ

## (undated) DEVICE — GLOVE SRG BIOGEL 6.5

## (undated) DEVICE — OCCLUDER COLPO-PNEUMO

## (undated) DEVICE — CANNULA 7 X70MM THRD SEAL SIDE PORT

## (undated) DEVICE — ANTI-FOG SOLUTION WITH FOAM PAD: Brand: DEVON

## (undated) DEVICE — IV FLUSH NSS 10ML POSIFLUSH

## (undated) DEVICE — 3M™ TEGADERM™ CHG DRESSING 25/CARTON 4 CARTONS/CASE 1659: Brand: TEGADERM™

## (undated) DEVICE — OCCLUSIVE GAUZE STRIP,3% BISMUTH TRIBROMOPHENATE IN PETROLATUM BLEND: Brand: XEROFORM

## (undated) DEVICE — 2, DISPOSABLE SUCTION/IRRIGATOR WITHOUT DISPOSABLE TIP: Brand: STRYKEFLOW

## (undated) DEVICE — ASTOUND SURGICAL GOWN, XXX LARGE, X-LONG: Brand: CONVERTORS

## (undated) DEVICE — SPONGE 4 X 4 XRAY 16 PLY STRL LF RFD

## (undated) DEVICE — UNDER BUTTOCKS DRAPE W/FLUID CONTROL POUCH: Brand: CONVERTORS

## (undated) DEVICE — SPONGE GAUZE 4 X 9

## (undated) DEVICE — PACK ARTHROSCOPY

## (undated) DEVICE — TUBING SUCTION 5MM X 12 FT

## (undated) DEVICE — BLADE SHAVER TORPEDO 4MM 13CM  COOLCUT